# Patient Record
Sex: MALE | Race: WHITE | Employment: OTHER | URBAN - METROPOLITAN AREA
[De-identification: names, ages, dates, MRNs, and addresses within clinical notes are randomized per-mention and may not be internally consistent; named-entity substitution may affect disease eponyms.]

---

## 2017-03-07 ENCOUNTER — OFFICE VISIT (OUTPATIENT)
Dept: INTERNAL MEDICINE CLINIC | Age: 67
End: 2017-03-07

## 2017-03-07 VITALS
RESPIRATION RATE: 18 BRPM | WEIGHT: 315 LBS | OXYGEN SATURATION: 98 % | HEART RATE: 62 BPM | BODY MASS INDEX: 41.75 KG/M2 | TEMPERATURE: 98.7 F | SYSTOLIC BLOOD PRESSURE: 111 MMHG | DIASTOLIC BLOOD PRESSURE: 67 MMHG | HEIGHT: 73 IN

## 2017-03-07 DIAGNOSIS — I10 ESSENTIAL HYPERTENSION: Chronic | ICD-10-CM

## 2017-03-07 DIAGNOSIS — Z00.00 PREVENTATIVE HEALTH CARE: Primary | ICD-10-CM

## 2017-03-07 DIAGNOSIS — M17.5 OTHER SECONDARY OSTEOARTHRITIS OF LEFT KNEE: ICD-10-CM

## 2017-03-07 DIAGNOSIS — E78.00 PURE HYPERCHOLESTEROLEMIA: Chronic | ICD-10-CM

## 2017-03-07 DIAGNOSIS — Z86.010 HX OF ADENOMATOUS COLONIC POLYPS: ICD-10-CM

## 2017-03-07 NOTE — PROGRESS NOTES
HISTORY OF PRESENT ILLNESS  Susan Thibodeaux is a 61 y.o. male. HPI  Susan Thibodeaux is here for complete health maintenance physical exam and screening. he does have other concerns. Hypertension:  Susan Thibodeaux is a 61 y.o. male with hypertension. without Chronic kidney disease stage    Medication change since last visit: No  The patient reports:  taking medications as instructed, no medication side effects noted, no chest pain on exertion, no dyspnea on exertion, no swelling of ankles, no orthostatic dizziness or lightheadedness, no palpitations. Lifestyle modification/social history: not attempting to follow a low fat, low cholesterol diet, not attempting to follow a low sodium diet, exercises sporadically, nonsmoker    Lab Results   Component Value Date/Time    Sodium 139 10/05/2016 08:39 AM    Potassium 4.2 10/05/2016 08:39 AM    Chloride 99 10/05/2016 08:39 AM    CO2 22 10/05/2016 08:39 AM    Glucose 98 10/05/2016 08:39 AM    BUN 18 10/05/2016 08:39 AM    Creatinine 1.01 10/05/2016 08:39 AM    BUN/Creatinine ratio 18 10/05/2016 08:39 AM    GFR est AA 91 10/05/2016 08:39 AM    GFR est non-AA 79 10/05/2016 08:39 AM    Calcium 9.2 10/05/2016 08:39 AM         Hyperlipidemia:  Susan Thibodeaux is following up on his dyslipidemia. Cardiovascular risks for him are: LDL goal is under 100  hypertension  obese. Currently he takes Lipitor (atorvastatin) ,   Lab Results   Component Value Date/Time    Cholesterol, total 139 10/05/2016 08:39 AM    HDL Cholesterol 45 10/05/2016 08:39 AM    LDL, calculated 78 10/05/2016 08:39 AM    Triglyceride 78 10/05/2016 08:39 AM     No results found for: GPT, ALT, SGOT, GGT, GGTP, AP, APIT, APX, CBIL, TBIL, TBILI    Myalgias: no  Fatigue: no            Health maintenance hx includes:  Exercise: moderately active.   Form of exercise: yardwork   Diet: not attempting to follow a low fat, low cholesterol diet, exercises sporadically, nonsmoker    Cancer screening:    Colon cancer screening:  Last Colonoscopy: 2013 and was normal   Prostate cancer screening: PSA and/or RADHA: 2015 - normal  No results found for: PSA, PSA2, PSAR1, Henery Pear, PSAR3, [id number], [id number], PSALT       Lab Results   Component Value Date/Time    Cholesterol, total 139 10/05/2016 08:39 AM    HDL Cholesterol 45 10/05/2016 08:39 AM    LDL, calculated 78 10/05/2016 08:39 AM    VLDL, calculated 16 10/05/2016 08:39 AM    Triglyceride 78 10/05/2016 08:39 AM       Lab Results   Component Value Date/Time    Glucose 98 10/05/2016 08:39 AM         Immunizations:     Immunization History   Administered Date(s) Administered    Influenza High Dose Vaccine PF 10/05/2016    Influenza Vaccine 10/01/2015, 11/24/2015    Tdap 11/24/2014    Zoster Vaccine, Live 01/21/2016      Immunization status: up to date and documented. Social History     Social History    Marital status:      Spouse name: N/A    Number of children: N/A    Years of education: N/A     Occupational History    Not on file.      Social History Main Topics    Smoking status: Never Smoker    Smokeless tobacco: Not on file    Alcohol use 0.6 oz/week     1 Cans of beer, 0 Glasses of wine per week    Drug use: No    Sexual activity: Not Currently     Partners: Female     Other Topics Concern    Not on file     Social History Narrative     Past Surgical History:   Procedure Laterality Date    HX HEENT Left     partial thyroidectomy    HX ORTHOPAEDIC  1969    Left knee ACL     HX ORTHOPAEDIC  1979    dupytrens contractures    HX THYROIDECTOMY  05/2013    HX TONSILLECTOMY  1950    SKIN TISSUE PROCEDURE UNLISTED       Family History   Problem Relation Age of Onset    Diabetes Mother     Heart Disease Mother      heart valve replaced    Hypertension Father     Colon Polyps Father     Cancer Brother      Stomach      Current Outpatient Prescriptions on File Prior to Visit   Medication Sig Dispense Refill    triamcinolone acetonide (KENALOG) 0.1 % ointment Apply  to affected area two (2) times a day. use thin layer (Patient taking differently: Apply  to affected area two (2) times a day. use thin layer. Patient reports uses as needed) 30 g 2    cholecalciferol, vitamin D3, 2,000 unit tab Take  by mouth.  Omega-3 Fatty Acids 300 mg cap Take  by mouth.  calcium 500 mg tab Take  by mouth. With potassium      magnesium 250 mg tab Take  by mouth.  atorvastatin (LIPITOR) 10 mg tablet Take 10 mg by mouth nightly.  econazole nitrate (SPECTAZOLE) 1 % topical cream two (2) times a day. As needed      furosemide (LASIX) 20 mg tablet Take 20 mg by mouth daily.  indomethacin SR (INDOCIN SR) 75 mg SR capsule Take 75 mg by mouth daily as needed.  valsartan (DIOVAN) 160 mg tablet Take 160 mg by mouth daily.  verapamil ER (CALAN-SR) 240 mg CR tablet Take 240 mg by mouth two (2) times a day.  allopurinol (ZYLOPRIM) 300 mg tablet Take 1 Tab by mouth daily. 1 Tab 0     No current facility-administered medications on file prior to visit. .    Review of Systems   Constitutional: Negative for malaise/fatigue and weight loss. Eyes: Negative for blurred vision and pain. Respiratory: Negative for cough, shortness of breath and wheezing. Cardiovascular: Negative for chest pain, palpitations and leg swelling. Gastrointestinal: Negative for blood in stool, constipation, diarrhea, heartburn, nausea and vomiting. Genitourinary: Negative. Musculoskeletal: Negative for back pain, joint pain (L knee sometimes feels \"loose\" but no swelling, locking, collapsing. hx of ACL reconstruction in past) and myalgias. Skin: Negative for rash. Neurological: Negative for dizziness and headaches. Endo/Heme/Allergies: Negative for environmental allergies. Does not bruise/bleed easily. Psychiatric/Behavioral: Negative for depression. The patient is not nervous/anxious and does not have insomnia.         Physical Exam Constitutional: He is oriented to person, place, and time. He appears well-developed and well-nourished. No distress. Body mass index is 43.42 kg/(m^2). /67 (BP 1 Location: Left arm, BP Patient Position: Sitting)  Pulse 62  Temp 98.7 °F (37.1 °C) (Oral)   Resp 18  Ht 6' 1\" (1.854 m)  Wt 329 lb 2 oz (149.3 kg)  SpO2 98%  BMI 43.42 kg/m2   HENT:   Head: Normocephalic and atraumatic. Right Ear: Hearing, tympanic membrane and ear canal normal.   Left Ear: Hearing, tympanic membrane and ear canal normal.   Nose: Nose normal.   Mouth/Throat: Oropharynx is clear and moist and mucous membranes are normal. Normal dentition. Eyes: Conjunctivae and lids are normal. Pupils are equal, round, and reactive to light. Right eye exhibits no discharge. Left eye exhibits no discharge. No scleral icterus. Neck: Trachea normal. No thyromegaly present. Cardiovascular: Normal rate, regular rhythm, normal heart sounds, intact distal pulses and normal pulses. Exam reveals no gallop and no friction rub. No murmur heard. Pulmonary/Chest: Effort normal and breath sounds normal. No respiratory distress. Abdominal: Soft. Normal appearance and bowel sounds are normal. He exhibits no distension and no mass. There is no hepatosplenomegaly. There is no tenderness. There is no CVA tenderness. Musculoskeletal: He exhibits no edema or tenderness. Left knee: He exhibits decreased range of motion (mild to mod crepitus). He exhibits no swelling, no effusion and no deformity. No tenderness found. Lymphadenopathy:     He has no cervical adenopathy. Right: No inguinal adenopathy present. Left: No inguinal adenopathy present. Neurological: He is alert and oriented to person, place, and time. Skin: Skin is warm and dry. No rash noted. He is not diaphoretic. Psychiatric: He has a normal mood and affect.  His speech is normal and behavior is normal. Judgment and thought content normal. Cognition and memory are normal.   Nursing note and vitals reviewed. ASSESSMENT and Ginger Sawyer was seen today for well male. Diagnoses and all orders for this visit:    Preventative health care  -     [place]  he was advised to have follow up colonoscopy in 2018  The patient is advised to begin progressive daily aerobic exercise program, follow a low fat, low cholesterol diet and attempt to lose weight. Maddie Morales was counseled on age-appropriate/ guideline-based risk prevention behaviors and screening for a 61y.o. year old   male . We also discussed adjustments in screening based on family history if necessary. Printed instructions for preventative screening guidelines and healthy behaviors given to patient with after visit summary. Essential hypertension - Well controlled and stable. his medications were reviewed and refilled where necessary as noted below. Labs ordered as noted. -     METABOLIC PANEL, BASIC    Pure hypercholesterolemia -Well controlled and stable. his medications were reviewed and refilled where necessary as noted below. Labs ordered as noted. -     LIPID PANEL    Other secondary osteoarthritis of left knee -mild symptoms of osteoarthritis now. May refer to ortho if progressing  Hx of adenomatous colonic polyps  he was advised to have follow up colonoscopy in 2018        Follow-up Disposition:  Return in about 6 months (around 9/7/2017).

## 2017-03-07 NOTE — PATIENT INSTRUCTIONS

## 2017-03-08 LAB
BUN SERPL-MCNC: 18 MG/DL (ref 8–27)
BUN/CREAT SERPL: 17 (ref 10–22)
CALCIUM SERPL-MCNC: 9.5 MG/DL (ref 8.6–10.2)
CHLORIDE SERPL-SCNC: 97 MMOL/L (ref 96–106)
CHOLEST SERPL-MCNC: 141 MG/DL (ref 100–199)
CO2 SERPL-SCNC: 26 MMOL/L (ref 18–29)
CREAT SERPL-MCNC: 1.07 MG/DL (ref 0.76–1.27)
GLUCOSE SERPL-MCNC: 94 MG/DL (ref 65–99)
HDLC SERPL-MCNC: 46 MG/DL
INTERPRETATION, 910389: NORMAL
LDLC SERPL CALC-MCNC: 81 MG/DL (ref 0–99)
POTASSIUM SERPL-SCNC: 4.7 MMOL/L (ref 3.5–5.2)
PSA SERPL-MCNC: 0.3 NG/ML (ref 0–4)
SODIUM SERPL-SCNC: 140 MMOL/L (ref 134–144)
TRIGL SERPL-MCNC: 70 MG/DL (ref 0–149)
VLDLC SERPL CALC-MCNC: 14 MG/DL (ref 5–40)

## 2017-03-22 ENCOUNTER — OFFICE VISIT (OUTPATIENT)
Dept: DERMATOLOGY | Facility: AMBULATORY SURGERY CENTER | Age: 67
End: 2017-03-22

## 2017-03-22 VITALS
OXYGEN SATURATION: 98 % | DIASTOLIC BLOOD PRESSURE: 82 MMHG | WEIGHT: 315 LBS | RESPIRATION RATE: 21 BRPM | BODY MASS INDEX: 41.75 KG/M2 | TEMPERATURE: 98.1 F | HEART RATE: 61 BPM | HEIGHT: 73 IN | SYSTOLIC BLOOD PRESSURE: 116 MMHG

## 2017-03-22 DIAGNOSIS — Z85.820 PERSONAL HISTORY OF MALIGNANT MELANOMA OF SKIN: ICD-10-CM

## 2017-03-22 DIAGNOSIS — D22.9 BENIGN NEVUS: ICD-10-CM

## 2017-03-22 DIAGNOSIS — L82.1 SEBORRHEIC KERATOSES: ICD-10-CM

## 2017-03-22 DIAGNOSIS — R21 RASH AND OTHER NONSPECIFIC SKIN ERUPTION: Primary | ICD-10-CM

## 2017-03-22 RX ORDER — KETOCONAZOLE 20 MG/G
CREAM TOPICAL 2 TIMES DAILY
Qty: 60 G | Refills: 3 | Status: SHIPPED | OUTPATIENT
Start: 2017-03-22 | End: 2018-01-09 | Stop reason: SDUPTHER

## 2017-03-22 NOTE — MR AVS SNAPSHOT
Visit Information Date & Time Provider Department Dept. Phone Encounter #  
 3/22/2017 10:30 AM [facility] [address] [phone] [id number] Your Appointments 9/5/2017 10:00 AM  
ROUTINE CARE with Alisha Sweet, MD  
[facility] [address]) Appt Note: 6 mo  
 [place] [address] [address]  
[phone]  
  
   
 [address] [id number]  
  
    
 3/13/2018 10:15 AM  
COMPLETE PHYSICAL with Alihsa Sweet, MD  
[facility] [address]) Appt Note: cpe  
 [place] [address] [address]  
[phone]  
  
   
 [address] [facility] [address] Upcoming Health Maintenance Date Due COLONOSCOPY 10/23/1970 Pneumococcal 19-64 Highest Risk (1 of 3 - PCV13) 10/23/1971 DTaP/Tdap/Td series (2 - Td) 11/24/2024 Allergies as of 3/22/2017  Review Complete On: 3/22/2017 By: Lita Haile LPN No Known Allergies Current Immunizations  Reviewed on 3/7/2017 Name Date Influenza High Dose Vaccine PF 10/5/2016 Influenza Vaccine 11/24/2015, 10/1/2015 Tdap 11/24/2014 Zoster Vaccine, Live 1/21/2016 Not reviewed this visit Vitals BP Pulse Temp Resp Height(growth percentile) Weight(growth percentile) 116/82 (BP 1 Location: Left arm, BP Patient Position: Sitting) 61 98.1 °F (36.7 °C) (Oral) 21 6' 1\" (1.854 m) 329 lb (149.2 kg) SpO2 BMI Smoking Status 98% 43.41 kg/m2 Never Smoker Vitals History BMI and BSA Data Body Mass Index Body Surface Area  
 43.41 kg/m 2 2.77 m 2 Preferred Pharmacy Pharmacy Name Phone [id number] - [place] - [address] [phone] Your Updated Medication List  
  
   
This list is accurate as of: 3/22/17 10:42 AM.  Always use your most recent med list.  
  
  
  
  
 allopurinol 300 mg tablet Commonly known as:  [place] Potter Take 1 Tab by mouth daily. atorvastatin 10 mg tablet Commonly known as:  LIPITOR Take 10 mg by mouth nightly. calcium 500 mg Tab Take  by mouth. With potassium  
  
 cholecalciferol (vitamin D3) 2,000 unit Tab Take  by mouth.  
  
 econazole nitrate 1 % topical cream  
Commonly known as:  SPECTAZOLE  
two (2) times a day. As needed  
  
 furosemide 20 mg tablet Commonly known as:  LASIX Take 20 mg by mouth daily. indomethacin SR 75 mg SR capsule Commonly known as:  INDOCIN SR Take 75 mg by mouth daily as needed. magnesium 250 mg Tab Take  by mouth. Omega-3 Fatty Acids 300 mg Cap Take  by mouth.  
  
 triamcinolone acetonide 0.1 % ointment Commonly known as:  KENALOG Apply  to affected area two (2) times a day. use thin layer  
  
 valsartan 160 mg tablet Commonly known as:  DIOVAN Take 160 mg by mouth daily. verapamil  mg CR tablet Commonly known as:  CALAN-SR Take 240 mg by mouth two (2) times a day. Introducing [facility]! Nohemy Thornton introduces [employer] patient portal. Now you can access parts of your medical record, email your doctor's office, and request medication refills online. 1. In your internet browser, go to [email or web address] 2. Click on the First Time User? Click Here link in the Sign In box. You will see the New Member Sign Up page. 3. Enter your [employer] Access Code exactly as it appears below. You will not need to use this code after youve completed the sign-up process. If you do not sign up before the expiration date, you must request a new code. · [employer] Access Code: [email or web address] Expires: 6/5/2017 11:43 AM 
 
4. Enter the last four digits of your Social Security Number (xxxx) and Date of Birth (mm/dd/yyyy) as indicated and click Submit. You will be taken to the next sign-up page. 5. Create a [employer] ID. This will be your [employer] login ID and cannot be changed, so think of one that is secure and easy to remember. 6. Create a [employer] password. You can change your password at any time. 7. Enter your Password Reset Question and Answer. This can be used at a later time if you forget your password. 8. Enter your e-mail address. You will receive e-mail notification when new information is available in [address] 9. Click Sign Up. You can now view and download portions of your medical record. 10. Click the Download Summary menu link to download a portable copy of your medical information. If you have questions, please visit the Frequently Asked Questions section of the [employer] website. Remember, [employer] is NOT to be used for urgent needs. For medical emergencies, dial 911. Now available from your iPhone and Android! Please provide this summary of care documentation to your next provider. Your primary care clinician is listed as Julissa Schneider. If you have any questions after today's visit, please call [phone].

## 2017-03-22 NOTE — PROGRESS NOTES
Name: Yolanda Capps       Age: 61 y.o. Date: 3/22/2017    Chief Complaint:   Chief Complaint   Patient presents with    Skin Exam     3 Month       Subjective:    HPI  Mr. Yolanda Capps is a 61 y.o. male who presents for a full skin exam.  The patient's last skin exam was 3 months ago and the patient does have current complaints related to his skin. He continues with rash in each axilla - redness and irritation despite Triamcinolone. He does not use deodorant or anti-perspirant. He states he showers daily - sometimes two times daily which does help some. He has been using Selsun Blue under the axillary area as well with some relief. Mr. Yolanda Capps is feeling well and in his usual state of health today. The patient's pertinent skin history includes : MM - stage 1a of the left posterior shoulder (tx 2015), BCC of the scalp, right popliteal area    ROS: Constitutional: Negative. Dermatological : positive for - rash      Social History     Social History    Marital status:      Spouse name: N/A    Number of children: N/A    Years of education: N/A     Occupational History    Not on file.      Social History Main Topics    Smoking status: Never Smoker    Smokeless tobacco: Never Used    Alcohol use 0.6 oz/week     0 Glasses of wine, 1 Cans of beer per week    Drug use: No    Sexual activity: Not Currently     Partners: Female     Other Topics Concern    Not on file     Social History Narrative       Family History   Problem Relation Age of Onset    Diabetes Mother     Heart Disease Mother      heart valve replaced    Hypertension Father     Colon Polyps Father     Cancer Brother      Stomach        Past Medical History:   Diagnosis Date    Cancer ([address]     melanoma     Gout     Hypercholesterolemia     Hypertension     Skin cancer     melanoma (left post shoulder) and BCC x 2 (scalp, right popliteal)    Sun-damaged skin     Thyroid disease        Past Surgical History: Procedure Laterality Date    HX HEENT Left     partial thyroidectomy    HX ORTHOPAEDIC  1969    Left knee ACL     HX ORTHOPAEDIC  1979    dupytrens contractures    HX THYROIDECTOMY  05/2013    HX TONSILLECTOMY  1950    SKIN TISSUE PROCEDURE UNLISTED         Current Outpatient Prescriptions   Medication Sig Dispense Refill    ketoconazole (NIZORAL) 2 % topical cream Apply  to affected area two (2) times a day. Use thin layer under each axilla for rash for 10-14 days. 60 g 3    triamcinolone acetonide (KENALOG) 0.1 % ointment Apply  to affected area two (2) times a day. use thin layer (Patient taking differently: Apply  to affected area two (2) times a day. use thin layer. Patient reports uses as needed) 30 g 2    cholecalciferol, vitamin D3, 2,000 unit tab Take  by mouth.  Omega-3 Fatty Acids 300 mg cap Take  by mouth.  calcium 500 mg tab Take  by mouth. With potassium      magnesium 250 mg tab Take  by mouth.  atorvastatin (LIPITOR) 10 mg tablet Take 10 mg by mouth nightly.  furosemide (LASIX) 20 mg tablet Take 20 mg by mouth daily.  indomethacin SR (INDOCIN SR) 75 mg SR capsule Take 75 mg by mouth daily as needed.  valsartan (DIOVAN) 160 mg tablet Take 160 mg by mouth daily.  verapamil ER (CALAN-SR) 240 mg CR tablet Take 240 mg by mouth two (2) times a day.  allopurinol (ZYLOPRIM) 300 mg tablet Take 1 Tab by mouth daily. 1 Tab 0       No Known Allergies      Objective:    Visit Vitals    /82 (BP 1 Location: Left arm, BP Patient Position: Sitting)    Pulse 61    Temp 98.1 °F (36.7 °C) (Oral)    Resp 21    Ht 6' 1\" (1.854 m)    Wt 149.2 kg (329 lb)    SpO2 98%    BMI 43.41 kg/m2       Jaja Barragan is a 61 y.o. male who appears well and in no distress. He is awake, alert, and oriented. There is no preauricular, submandibular, or cervical lymphadenopathy.   A skin examination was performed including his scalp, face (including eyelids), ears, neck, chest, back, abdomen, upper extremities (including digits/nails), lower extremities, breasts, buttocks; genital skin was not examined. He has a few inflammatory papules and pustules on the face. There are areas of seborrheic dermatitis on the scalp with soft scale. There are a few inflamed pink seborrheic keratoses on the shoulders and upper back as well as non inflamed SKs on the trunk and extremities. There are numerous medium brown and dark brown junctional nevi without concerning features. There is a well healed scar on the left posterior shoulder - soft- no pigment or nodularity and no associated left axillary lymphadenopathy. There is pink skin change without inflammatory papules or pustules in each axillary area but left worse than right. Assessment/Plan:  1. Personal history of skin cancer. I discussed sun protection, sunscreen use, the warning signs of skin cancer, the need for self-skin examinations, and the need for regular practitioner exams every 3 months. The patient should follow up sooner as needed if new, changing, or symptomatic skin lesions arise. 2.Normal nevi. The diagnosis of normal nevi was reviewed. I discussed sun protection, sunscreen use, the warning signs of skin cancer, the need for self-skin examinations, and the need for regular practitioner exams every 3 months. The patient should follow up sooner as needed if new, changing, or symptomatic skin lesions arise. 3. Seborrheic keratoses. The diagnosis was reviewed and the patient was reassured that no treatment is needed for these benign lesions. 4. Rash in axillary area. This was not responsive to topical steroid which he states he has been compliant with. I will try adding anti-fungal and asked him to try Certain-Dry pads for sweating. We will see how this goes - he understands this may be more of a chronic condition.

## 2017-03-23 DIAGNOSIS — E78.00 PURE HYPERCHOLESTEROLEMIA: Chronic | ICD-10-CM

## 2017-03-23 RX ORDER — ATORVASTATIN CALCIUM 10 MG/1
10 TABLET, FILM COATED ORAL
Qty: 30 TAB | Refills: 6 | Status: SHIPPED | OUTPATIENT
Start: 2017-03-23 | End: 2018-02-21 | Stop reason: SDUPTHER

## 2017-06-20 ENCOUNTER — HOSPITAL ENCOUNTER (OUTPATIENT)
Dept: LAB | Age: 67
Discharge: HOME | End: 2017-06-20

## 2017-06-20 ENCOUNTER — OFFICE VISIT (OUTPATIENT)
Dept: DERMATOLOGY | Facility: AMBULATORY SURGERY CENTER | Age: 67
End: 2017-06-20

## 2017-06-20 VITALS
HEIGHT: 73 IN | BODY MASS INDEX: 41.75 KG/M2 | WEIGHT: 315 LBS | SYSTOLIC BLOOD PRESSURE: 129 MMHG | TEMPERATURE: 98 F | OXYGEN SATURATION: 98 % | DIASTOLIC BLOOD PRESSURE: 62 MMHG | HEART RATE: 63 BPM

## 2017-06-20 DIAGNOSIS — D22.9 MULTIPLE BENIGN NEVI: ICD-10-CM

## 2017-06-20 DIAGNOSIS — Z85.820 HISTORY OF MELANOMA: ICD-10-CM

## 2017-06-20 DIAGNOSIS — L08.9 PUSTULE: ICD-10-CM

## 2017-06-20 DIAGNOSIS — Z85.828 HISTORY OF NONMELANOMA SKIN CANCER: ICD-10-CM

## 2017-06-20 DIAGNOSIS — D48.5 NEOPLASM OF UNCERTAIN BEHAVIOR OF SKIN OF SHOULDER: Primary | ICD-10-CM

## 2017-06-20 DIAGNOSIS — R21 RASH: ICD-10-CM

## 2017-06-20 DIAGNOSIS — D18.01 CHERRY ANGIOMA: ICD-10-CM

## 2017-06-20 DIAGNOSIS — R23.8 INFLAMMATORY PAPULE: ICD-10-CM

## 2017-06-20 DIAGNOSIS — L90.5 SCAR CONDITION AND FIBROSIS OF SKIN: ICD-10-CM

## 2017-06-20 NOTE — PROGRESS NOTES
Name: Yeimi Steel       Age: 61 y.o. Date: 6/20/2017    Chief Complaint:   Chief Complaint   Patient presents with    Skin Exam     no chief complaint       Subjective:    HPI  Mr. Yeimi Steel is a 61 y.o. male who presents for a full skin exam.  The patient's last skin exam was on 3/22/17 and the patient does have current complaints related to his skin. He reports there is a tender lesion in the right ear. This is newly just noticed. No bleeding that he is aware of. Mr. Yeimi Steel is feeling well and in his usual state of health today. 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 Dermatological : positive for - skin lesion changes      Social History     Social History    Marital status:      Spouse name: N/A    Number of children: N/A    Years of education: N/A     Occupational History    Not on file.      Social History Main Topics    Smoking status: Never Smoker    Smokeless tobacco: Never Used    Alcohol use 0.6 oz/week     0 Glasses of wine, 1 Cans of beer per week    Drug use: No    Sexual activity: Not Currently     Partners: Female     Other Topics Concern    Not on file     Social History Narrative       Family History   Problem Relation Age of Onset    Diabetes Mother     Heart Disease Mother      heart valve replaced    Hypertension Father     Colon Polyps Father     Cancer Brother      Stomach        Past Medical History:   Diagnosis Date    Cancer ([facility] [address]     melanoma     Gout     Hypercholesterolemia     Hypertension     Skin cancer     melanoma (left post shoulder) and BCC x 2 (scalp, right popliteal)    Sun-damaged skin     Thyroid disease        Past Surgical History:   Procedure Laterality Date    HX HEENT Left     partial thyroidectomy    HX ORTHOPAEDIC  1969    Left knee ACL     HX ORTHOPAEDIC  1979    dupytrens contractures    HX THYROIDECTOMY  05/2013    HX TONSILLECTOMY  1950    SKIN TISSUE PROCEDURE UNLISTED         Current Outpatient Prescriptions   Medication Sig Dispense Refill    atorvastatin (LIPITOR) 10 mg tablet Take 1 Tab by mouth nightly. 30 Tab 6    ketoconazole (NIZORAL) 2 % topical cream Apply  to affected area two (2) times a day. Use thin layer under each axilla for rash for 10-14 days. 60 g 3    triamcinolone acetonide (KENALOG) 0.1 % ointment Apply  to affected area two (2) times a day. use thin layer (Patient taking differently: Apply  to affected area two (2) times a day. use thin layer. Patient reports uses as needed) 30 g 2    cholecalciferol, vitamin D3, 2,000 unit tab Take  by mouth.  Omega-3 Fatty Acids 300 mg cap Take  by mouth.  calcium 500 mg tab Take  by mouth. With potassium      magnesium 250 mg tab Take  by mouth.  furosemide (LASIX) 20 mg tablet Take 20 mg by mouth daily.  indomethacin SR (INDOCIN SR) 75 mg SR capsule Take 75 mg by mouth daily as needed.  valsartan (DIOVAN) 160 mg tablet Take 160 mg by mouth daily.  verapamil ER (CALAN-SR) 240 mg CR tablet Take 240 mg by mouth two (2) times a day.  allopurinol (ZYLOPRIM) 300 mg tablet Take 1 Tab by mouth daily. 1 Tab 0       No Known Allergies      Objective:    Visit Vitals    /62 (BP 1 Location: Right arm, BP Patient Position: Sitting)    Pulse 63    Temp 98 °F (36.7 °C)    Ht 6' 1\" (1.854 m)    Wt 149.2 kg (329 lb)    SpO2 98%    BMI 43.41 kg/m2       Abel Echeverria is a 61 y.o. male who appears well and in no distress. He is awake, alert, and oriented. There is no preauricular, submandibular, or cervical lymphadenopathy.   A skin examination was performed including his scalp, face (including eyelids), ears, neck, chest, back, abdomen, upper extremities (including digits/nails), lower extremities, breasts, buttocks; genital skin was not examined. Is a pustule in the right conchal bowl as well as many pustules on the face and trunk. There are numerous skin tags in each axilla. He has scattered medium brown junctional nevi a couple that are pink and brown compound and a few intradermal nevi without concerning features. On the left posterior shoulder is a well-healed scar no nodularity or pigment to suggest lesion recurrence and no associated left axillary lymph nodes are palpated. Left posterior shoulder also has a 5 x 3 mm pink shiny macule consistent with basal cell carcinoma. There are scattered cherry angiomas. He has a pink contact dermatitis appearing rash on the left flank and right medial calf. No vesicles are noted in this rash. There is a well-healed scar on the right posterior calf without evidence of lesion recurrence. Assessment/Plan:  1. Neoplasm of Uncertain Behavior, left posterior shoulder, r/o BCC. The differential diagnoses were discussed. Shave biopsy followed by curettage was advised to address this lesion with malignant destruction. The procedure was reviewed and verbal and written consent were obtained. The risks of pain, bleeding, infection, scar, and recurrence of the lesion were discussed. I performed the procedure. The site was cleansed and anesthetized with 1% Lidocaine with Epinephrine 1:100,000. Curettage was performed by me to include a 2 mm margin, resulting in a post curettage defect size of 9 x 7 mm. Drysol was used for hemostasis. The wound was bandaged and care reviewed. The specimen was sent to pathology. I will contact the patient with the results and any further treatment that may be necessary. [facility]   OFFICE PROCEDURE PROGRESS NOTE   Chart reviewed for the following:   I, Martina Davis, have reviewed the History, Physical and updated the Allergic reactions for [facility].    TIME OUT performed immediately prior to start of procedure:   I, Orestes Mullen, have performed the following reviews on [facility] 36   prior to the start of the procedure:     * Patient was identified by name and date of birth   * Agreement on procedure being performed was verified   * Risks and Benefits explained to the patient   * Procedure site verified and marked as necessary   * Patient was positioned for comfort   * Consent was signed and verified     Time: 1100  Date of procedure: 6/20/2017  Procedure performed by: Diego Block. Robyn Mullen  Provider assisted by: lpn   Patient assisted by: self   How tolerated by patient: tolerated the procedure well with no complications   Comments: none    2. Personal history of skin cancer, ,melanoma and non melanoma skin cancers. I discussed sun protection, sunscreen use, the warning signs of skin cancer, the need for self-skin examinations, and the need for regular practitioner exams every 3 months. The patient should follow up sooner as needed if new, changing, or symptomatic skin lesions arise. 3.Cherry angiomas. The diagnosis was reviewed and the patient was reassured that no treatment is needed for these benign lesions. 4.Normal nevi. The diagnosis of normal nevi was reviewed. I discussed sun protection, sunscreen use, the warning signs of skin cancer, the need for self-skin examinations, and the need for regular practitioner exams every 3 months. The patient should follow up sooner as needed if new, changing, or symptomatic skin lesions arise. 5. Inflammatory papules, pustules and rash. Diagnoses discussed. BP samples given. Continue OTC treatment. Offered topical steroid for rash be he deferred.

## 2017-06-26 ENCOUNTER — TELEPHONE (OUTPATIENT)
Dept: DERMATOLOGY | Facility: AMBULATORY SURGERY CENTER | Age: 67
End: 2017-06-26

## 2017-06-26 NOTE — PROGRESS NOTES
I spoke with the patient and he is aware of the diagnosis and that this was treated with Curettage at the visit ([address]). Follow up 6 mos.

## 2017-06-26 NOTE — TELEPHONE ENCOUNTER
Pt called said he got a phone call and he was returning the call.  Pt stated that he will be on the road and unable to take calls , if you could call tomorrow

## 2017-09-05 ENCOUNTER — OFFICE VISIT (OUTPATIENT)
Dept: INTERNAL MEDICINE CLINIC | Age: 67
End: 2017-09-05

## 2017-09-05 VITALS
DIASTOLIC BLOOD PRESSURE: 65 MMHG | HEART RATE: 63 BPM | TEMPERATURE: 98.3 F | HEIGHT: 73 IN | BODY MASS INDEX: 41.75 KG/M2 | SYSTOLIC BLOOD PRESSURE: 111 MMHG | RESPIRATION RATE: 18 BRPM | WEIGHT: 315 LBS | OXYGEN SATURATION: 98 %

## 2017-09-05 DIAGNOSIS — E78.00 PURE HYPERCHOLESTEROLEMIA: Chronic | ICD-10-CM

## 2017-09-05 DIAGNOSIS — Z23 ENCOUNTER FOR IMMUNIZATION: ICD-10-CM

## 2017-09-05 DIAGNOSIS — I10 ESSENTIAL HYPERTENSION: Primary | Chronic | ICD-10-CM

## 2017-09-05 DIAGNOSIS — Z86.010 HX OF ADENOMATOUS COLONIC POLYPS: ICD-10-CM

## 2017-09-05 NOTE — PROGRESS NOTES
HISTORY OF PRESENT ILLNESS  Lis Olivares is a 61 y.o. male. HPI  Hypertension:  Lis Olivares is a 61 y.o. male with hypertension. without Chronic kidney disease stage    Medication change since last visit: No  The patient reports:  taking medications as instructed, no medication side effects noted, home BP monitoring in range of 226'F systolic over 14'K diastolic, no TIA's, no chest pain on exertion, no dyspnea on exertion, no swelling of ankles, no orthostatic dizziness or lightheadedness, no palpitations. Lifestyle modification/social history: generally follows a low fat low cholesterol diet, generally follows a low sodium diet, exercises regularly, nonsmoker    Lab Results   Component Value Date/Time    Sodium 140 03/07/2017 11:01 AM    Potassium 4.7 03/07/2017 11:01 AM    Chloride 97 03/07/2017 11:01 AM    CO2 26 03/07/2017 11:01 AM    Glucose 94 03/07/2017 11:01 AM    BUN 18 03/07/2017 11:01 AM    Creatinine 1.07 03/07/2017 11:01 AM    BUN/Creatinine ratio 17 03/07/2017 11:01 AM    GFR est AA 84 03/07/2017 11:01 AM    GFR est non-AA 73 03/07/2017 11:01 AM    Calcium 9.5 03/07/2017 11:01 AM       Hyperlipidemia:  Lis Olivares is following up on his dyslipidemia. Cardiovascular risks for him are: LDL goal is under 100  hypertension.    Currently he takes Lipitor (atorvastatin) ,   Lab Results   Component Value Date/Time    Cholesterol, total 141 03/07/2017 11:01 AM    Cholesterol, total 139 10/05/2016 08:39 AM    HDL Cholesterol 46 03/07/2017 11:01 AM    HDL Cholesterol 45 10/05/2016 08:39 AM    LDL, calculated 81 03/07/2017 11:01 AM    LDL, calculated 78 10/05/2016 08:39 AM    Triglyceride 70 03/07/2017 11:01 AM    Triglyceride 78 10/05/2016 08:39 AM     No results found for: GPT, ALT, SGOT, GGT, GGTP, AP, APIT, APX, CBIL, TBIL, TBILI    Myalgias: no  Fatigue: no            ROS    Physical Exam  Visit Vitals    /65 (BP 1 Location: Left arm, BP Patient Position: Sitting)    Pulse 63    Temp 98.3 °F (36.8 °C) (Oral)    Resp 18    Ht 6' 1\" (1.854 m)    Wt 324 lb 2 oz (147 kg)    SpO2 98%    BMI 42.76 kg/m2       ASSESSMENT and PLAN  Diagnoses and all orders for this visit:    1. Essential hypertension - Well controlled and stable. his medications were reviewed and refilled where necessary as noted below. Labs ordered as noted. -     METABOLIC PANEL, BASIC    2. Pure hypercholesterolemia -Well controlled and stable. his medications were reviewed and refilled where necessary as noted below. Labs ordered as noted. Recheck next visit    3. Encounter for immunization  -     influenza vaccine 2015-16, 65yr+,,PF, (FLUZONE HIGH-DOSE) syrg injection; 0.5 mL by IntraMUSCular route PRIOR TO DISCHARGE for 1 dose. 4. Hx of adenomatous colonic polyps  he was advised to have follow up colonoscopy in 2018          Follow-up Disposition:  Return in about 6 months (around 3/5/2018).

## 2017-09-05 NOTE — MR AVS SNAPSHOT
Visit Information Date & Time Provider Department Dept. Phone Encounter #  
 9/5/2017 10:00 AM Rosmery Forbes MD [facility] [address] [facility] [id number] Follow-up Instructions Return in about 6 months (around 3/5/2018). Your Appointments 9/26/2017  9:30 AM  
Office Visit with PABLITO Maddenalexi [address]) Appt Note: 3 Months Skin exam  
 [facility] [address] [facility] [phone]  
  
   
 [facility] [address] [facility] [address]  
  
    
 3/13/2018 10:15 AM  
COMPLETE PHYSICAL with Rosmery Forbes MD  
[facility] [place] [address] [facility]) Appt Note: cpe  
 [address] [facility] [address] [phone]  
[phone]  
  
   
 [address] [facility] [address] Upcoming Health Maintenance Date Due COLONOSCOPY 10/23/1970 Pneumococcal 19-64 Highest Risk (1 of 3 - PCV13) 10/23/1971 INFLUENZA AGE 9 TO ADULT 8/1/2017 DTaP/Tdap/Td series (2 - Td) 11/24/2024 Allergies as of 9/5/2017  Review Complete On: 6/20/2017 By: Alexandria Rust NP No Known Allergies Current Immunizations  Reviewed on 9/5/2017 Name Date Influenza High Dose Vaccine PF 10/5/2016 Influenza Vaccine 11/24/2015, 10/1/2015 Tdap 11/24/2014 Zoster Vaccine, Live 1/21/2016 Reviewed by Rosmery Forbes MD on 9/5/2017 at 10:33 AM  
 Reviewed by Rosmery Forbes MD on 9/5/2017 at 10:33 AM  
 Reviewed by Rosmery Forbes MD on 9/5/2017 at 10:33 AM  
 Reviewed by Rosmery Forbes MD on 9/5/2017 at 10:34 AM  
You Were Diagnosed With   
  
 Codes Comments Essential hypertension    -  Primary ICD-10-CM: I10 
ICD-9-CM: 401.9 Pure hypercholesterolemia     ICD-10-CM: E78.00 ICD-9-CM: 272.0 Encounter for immunization     ICD-10-CM: I13 ICD-9-CM: V03.89  Hx of adenomatous colonic polyps     ICD-10-CM: Z86.010 
 ICD-9-CM: V12.72 Vitals BP Pulse Temp Resp Height(growth percentile) Weight(growth percentile) 111/65 (BP 1 Location: Left arm, BP Patient Position: Sitting) 63 98.3 °F (36.8 °C) (Oral) 18 6' 1\" (1.854 m) 324 lb 2 oz (147 kg) SpO2 BMI Smoking Status 98% 42.76 kg/m2 Never Smoker Vitals History BMI and BSA Data Body Mass Index Body Surface Area 42.76 kg/m 2 2.75 m 2 Preferred Pharmacy Pharmacy Name Phone [id number] [place] [address] [facility] [address] [phone] Your Updated Medication List  
  
   
This list is accurate as of: 9/5/17 10:41 AM.  Always use your most recent med list.  
  
  
  
  
 allopurinol 300 mg tablet Commonly known as:  Krishnan Massed Take 1 Tab by mouth daily. atorvastatin 10 mg tablet Commonly known as:  LIPITOR Take 1 Tab by mouth nightly. calcium 500 mg Tab Take  by mouth. With potassium  
  
 cholecalciferol (vitamin D3) 2,000 unit Tab Take  by mouth. furosemide 20 mg tablet Commonly known as:  LASIX Take 20 mg by mouth daily. indomethacin SR 75 mg SR capsule Commonly known as:  INDOCIN SR Take 75 mg by mouth daily as needed. influenza vaccine 2015-16 (65yr+)(PF) Syrg injection Commonly known as:  FLUZONE HIGH-DOSE  
0.5 mL by IntraMUSCular route PRIOR TO DISCHARGE for 1 dose. ketoconazole 2 % topical cream  
Commonly known as:  NIZORAL Apply  to affected area two (2) times a day. Use thin layer under each axilla for rash for 10-14 days. magnesium 250 mg Tab Take  by mouth. Omega-3 Fatty Acids 300 mg Cap Take  by mouth.  
  
 triamcinolone acetonide 0.1 % ointment Commonly known as:  KENALOG Apply  to affected area two (2) times a day. use thin layer  
  
 valsartan 160 mg tablet Commonly known as:  DIOVAN Take 160 mg by mouth daily. verapamil  mg CR tablet Commonly known as:  CALAN-SR  
 Take 240 mg by mouth two (2) times a day. Prescriptions Printed Refills  
 influenza vaccine 2015-16, 65yr+,,PF, (FLUZONE HIGH-DOSE) syrg injection 0 Si.5 mL by IntraMUSCular route PRIOR TO DISCHARGE for 1 dose. Class: Print Route: IntraMUSCular We Performed the Following METABOLIC PANEL, BASIC [98575 CPT(R)] Follow-up Instructions Return in about 6 months (around 3/5/2018). Patient Instructions   
[employer] Introducing [facility]! Franchesca Tafoya introduces Image[employer] patient portal. Now you can access parts of your medical record, email your doctor's office, and request medication refills online. 1. In your internet browser, go to [email or web address] 2. Click on the First Time User? Click Here link in the Sign In box. You will see the New Member Sign Up page. 3. Enter your Image[employer]ct Access Code exactly as it appears below. You will not need to use this code after youve completed the sign-up process. If you do not sign up before the expiration date, you must request a new code. · Image[employer]ct Access Code: [email or web address] Expires: 2017 10:36 AM 
 
4. Enter the last four digits of your Social Security Number (xxxx) and Date of Birth (mm/dd/yyyy) as indicated and click Submit. You will be taken to the next sign-up page. 5. Create a ImageProtect ID. This will be your Image[employer]ct login ID and cannot be changed, so think of one that is secure and easy to remember. 6. Create a [employer] password. You can change your password at any time. 7. Enter your Password Reset Question and Answer. This can be used at a later time if you forget your password. 8. Enter your e-mail address. You will receive e-mail notification when new information is available in [address] 9. Click Sign Up. You can now view and download portions of your medical record.  
10. Click the Download Summary menu link to download a portable copy of your medical information. If you have questions, please visit the Frequently Asked Questions section of the [employer] website. Remember, [employer] is NOT to be used for urgent needs. For medical emergencies, dial 911. Now available from your iPhone and Android! Please provide this summary of care documentation to your next provider. Your primary care clinician is listed as Joseph Castellano. If you have any questions after today's visit, please call [phone].

## 2017-09-06 LAB
BUN SERPL-MCNC: 20 MG/DL (ref 8–27)
BUN/CREAT SERPL: 18 (ref 10–24)
CALCIUM SERPL-MCNC: 9.6 MG/DL (ref 8.6–10.2)
CHLORIDE SERPL-SCNC: 97 MMOL/L (ref 96–106)
CO2 SERPL-SCNC: 24 MMOL/L (ref 18–29)
CREAT SERPL-MCNC: 1.12 MG/DL (ref 0.76–1.27)
GLUCOSE SERPL-MCNC: 91 MG/DL (ref 65–99)
POTASSIUM SERPL-SCNC: 4.5 MMOL/L (ref 3.5–5.2)
SODIUM SERPL-SCNC: 138 MMOL/L (ref 134–144)

## 2017-09-07 DIAGNOSIS — I10 ESSENTIAL HYPERTENSION: Chronic | ICD-10-CM

## 2017-09-07 DIAGNOSIS — M10.00 IDIOPATHIC GOUT, UNSPECIFIED CHRONICITY, UNSPECIFIED SITE: Chronic | ICD-10-CM

## 2017-09-07 RX ORDER — VERAPAMIL HYDROCHLORIDE 240 MG/1
240 TABLET, FILM COATED, EXTENDED RELEASE ORAL 2 TIMES DAILY
Qty: 30 TAB | Refills: 2 | Status: SHIPPED | OUTPATIENT
Start: 2017-09-07 | End: 2017-10-18 | Stop reason: SDUPTHER

## 2017-09-07 RX ORDER — FUROSEMIDE 20 MG/1
20 TABLET ORAL DAILY
Qty: 30 TAB | Refills: 5 | Status: SHIPPED | OUTPATIENT
Start: 2017-09-07 | End: 2018-02-21 | Stop reason: SDUPTHER

## 2017-09-07 RX ORDER — VALSARTAN 160 MG/1
160 TABLET ORAL DAILY
Qty: 30 TAB | Refills: 1 | Status: SHIPPED | OUTPATIENT
Start: 2017-09-07 | End: 2017-11-03 | Stop reason: SDUPTHER

## 2017-09-07 RX ORDER — ALLOPURINOL 300 MG/1
300 TABLET ORAL DAILY
Qty: 30 TAB | Refills: 1 | Status: SHIPPED | OUTPATIENT
Start: 2017-09-07 | End: 2017-11-03 | Stop reason: SDUPTHER

## 2017-09-07 NOTE — TELEPHONE ENCOUNTER
These four prescription were not filled when he came in to see Dr Jack Renteria. Did want him to cont taking them.  (1)allopurinol (ZYLOPRIM) 300 mg tablet (2)furosemide (LASIX) 20 mg tablet (3)valsartan (DIOVAN) 160 mg tablet (4)verapamil ER (CALAN-SR) 240 mg CR tablet  [phone]   His no is [phone]

## 2017-09-26 ENCOUNTER — OFFICE VISIT (OUTPATIENT)
Dept: DERMATOLOGY | Facility: AMBULATORY SURGERY CENTER | Age: 67
End: 2017-09-26

## 2017-09-26 VITALS
RESPIRATION RATE: 18 BRPM | BODY MASS INDEX: 41.75 KG/M2 | WEIGHT: 315 LBS | HEIGHT: 73 IN | TEMPERATURE: 98.5 F | OXYGEN SATURATION: 97 % | HEART RATE: 57 BPM | DIASTOLIC BLOOD PRESSURE: 80 MMHG | SYSTOLIC BLOOD PRESSURE: 140 MMHG

## 2017-09-26 DIAGNOSIS — L82.1 SEBORRHEIC KERATOSES: ICD-10-CM

## 2017-09-26 DIAGNOSIS — Z85.828 HISTORY OF BASAL CELL CARCINOMA: ICD-10-CM

## 2017-09-26 DIAGNOSIS — D22.9 MULTIPLE BENIGN NEVI: ICD-10-CM

## 2017-09-26 DIAGNOSIS — L25.9 CONTACT DERMATITIS AND OTHER ECZEMA, DUE TO UNSPECIFIED CAUSE: Primary | ICD-10-CM

## 2017-09-26 DIAGNOSIS — Z85.820 PERSONAL HISTORY OF MALIGNANT MELANOMA OF SKIN: ICD-10-CM

## 2017-09-26 DIAGNOSIS — L90.5 SCAR CONDITION AND FIBROSIS OF SKIN: ICD-10-CM

## 2017-09-26 DIAGNOSIS — L91.8 CUTANEOUS SKIN TAGS: ICD-10-CM

## 2017-09-26 DIAGNOSIS — Z85.828 FOLLOW-UP SURVEILLANCE OF SKIN CANCER, ENCOUNTER FOR: ICD-10-CM

## 2017-09-26 DIAGNOSIS — D18.01 CHERRY ANGIOMA: ICD-10-CM

## 2017-09-26 DIAGNOSIS — Z08 FOLLOW-UP SURVEILLANCE OF SKIN CANCER, ENCOUNTER FOR: ICD-10-CM

## 2017-09-26 RX ORDER — TRIAMCINOLONE ACETONIDE 1 MG/G
OINTMENT TOPICAL 2 TIMES DAILY
Qty: 30 G | Refills: 2 | Status: SHIPPED | OUTPATIENT
Start: 2017-09-26 | End: 2018-01-09

## 2017-09-26 NOTE — PROGRESS NOTES
Name: Arnie Helms       Age: 61 y.o. Date: 9/26/2017    Chief Complaint:   Chief Complaint   Patient presents with    Skin Exam       Subjective:    HPI  Mr. Arnie Helms is a 61 y.o. male who presents for a full skin exam.  The patient's last skin exam was 3 months ago and the patient does have current complaints related to his skin. He reports a new growth on the left areola. He denies associated symptoms. He has also noticed a rash on the left flank and left lower leg. 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 This does itch. The patient's pertinent skin history includes : personal history of BCC on the left posterior shoulder, scalp and right popliteal area. History of malignant melanoma stage 1a from the left shoulder - Breslow depth 0.3mm, treated in 2015 with wide excision. ROS: Constitutional: Negative. Dermatological : positive for - skin lesion changes      Social History     Social History    Marital status:      Spouse name: N/A    Number of children: N/A    Years of education: N/A     Occupational History    Not on file.      Social History Main Topics    Smoking status: Never Smoker    Smokeless tobacco: Never Used    Alcohol use 0.6 oz/week     0 Glasses of wine, 1 Cans of beer per week    Drug use: No    Sexual activity: Not Currently     Partners: Female     Other Topics Concern    Not on file     Social History Narrative       Family History   Problem Relation Age of Onset    Diabetes Mother     Heart Disease Mother      heart valve replaced    Hypertension Father     Colon Polyps Father     Cancer Brother      Stomach        Past Medical History:   Diagnosis Date    Cancer ([facility] [address]     melanoma     Gout     Hypercholesterolemia     Hypertension     Skin cancer     melanoma (left post shoulder) and BCC x 2 (scalp, right popliteal)    Sun-damaged skin     Thyroid disease        Past Surgical History:   Procedure Laterality Date    HX HEENT Left     partial thyroidectomy    HX ORTHOPAEDIC  1969    Left knee ACL     HX ORTHOPAEDIC  1979    dupytrens contractures    HX THYROIDECTOMY  05/2013    HX TONSILLECTOMY  1950    SKIN TISSUE PROCEDURE UNLISTED         Current Outpatient Prescriptions   Medication Sig Dispense Refill    triamcinolone acetonide (KENALOG) 0.1 % ointment Apply  to affected area two (2) times a day. use thin layer 30 g 2    valsartan (DIOVAN) 160 mg tablet Take 1 Tab by mouth daily. 30 Tab 1    allopurinol (ZYLOPRIM) 300 mg tablet Take 1 Tab by mouth daily. 30 Tab 1    furosemide (LASIX) 20 mg tablet Take 1 Tab by mouth daily. 30 Tab 5    verapamil ER (CALAN-SR) 240 mg CR tablet Take 1 Tab by mouth two (2) times a day. 30 Tab 2    atorvastatin (LIPITOR) 10 mg tablet Take 1 Tab by mouth nightly. 30 Tab 6    ketoconazole (NIZORAL) 2 % topical cream Apply  to affected area two (2) times a day. Use thin layer under each axilla for rash for 10-14 days. 60 g 3    cholecalciferol, vitamin D3, 2,000 unit tab Take  by mouth.  Omega-3 Fatty Acids 300 mg cap Take  by mouth.  calcium 500 mg tab Take  by mouth. With potassium      magnesium 250 mg tab Take 400 mg by mouth daily.  indomethacin SR (INDOCIN SR) 75 mg SR capsule Take 75 mg by mouth daily as needed.  influenza vaccine 2015-16, 65yr+,,PF, (FLUZONE HIGH-DOSE) syrg injection 0.5 mL by IntraMUSCular route PRIOR TO DISCHARGE for 1 dose. 0.5 mL 0       No Known Allergies      Objective:    Visit Vitals    /80 (BP 1 Location: Right arm, BP Patient Position: Sitting)    Pulse (!) 57    Temp 98.5 °F (36.9 °C) (Oral)    Resp 18    Ht 6' 1\" (1.854 m)    Wt 148.8 kg (328 lb)    SpO2 97%    BMI 43.27 kg/m2       Kimo Hernandez is a 61 y.o. male who appears well and in no distress. He is awake, alert, and oriented. There is no preauricular, submandibular, or cervical lymphadenopathy.   A skin examination was performed including his scalp, face (including eyelids), ears, neck, chest, back, abdomen, upper extremities (including digits/nails), lower extremities, breasts, buttocks; genital skin was not examined. He is scattered seborrheic keratoses on the scalp, trunk and extremities. There are scattered cherry angiomas. He has skin tags noted on the left areola, and numerous in each axilla. There is dermographism on the left flank with a few hives and scaly redness on the left anterior shin. No vesicles or petechiae. He has a well-healed scar in the left posterior shoulder is linear, without nodularity or pigment to suggest lesion recurrence-and there is no palpable left axillary lymphadenopathy associated. He has well-healed scars in the left posterior shoulder, scalp and right popliteal area without evidence of lesion recurrence. He has benign-appearing nevi as well. There are multiple pustules and inflammatory papules on the face, trunk. Assessment/Plan:  1. Personal history of skin cancer. I discussed sun protection, sunscreen use, the warning signs of skin cancer, the need for self-skin examinations, and the need for regular practitioner exams every 3 months. The patient should follow up sooner as needed if new, changing, or symptomatic skin lesions arise. 2. Normal nevi. The diagnosis of normal nevi was reviewed. I discussed sun protection, sunscreen use, the warning signs of skin cancer, the need for self-skin examinations, and the need for regular practitioner exams every 3 months. The patient should follow up sooner as needed if new, changing, or symptomatic skin lesions arise. 3.Seborrheic keratoses. The diagnosis was reviewed and the patient was reassured that no treatment is needed for these benign lesions. 4. Cherry angiomas. The diagnosis was reviewed and the patient was reassured that no treatment is needed for these benign lesions. 5. Cutaneous skin tags. 6. Rash. Most consistent with a contact dermatitis plus dermatographism. I renewed his Triamcinolone.

## 2017-09-26 NOTE — PROGRESS NOTES
Chief Complaint   Patient presents with    Skin Exam     1. Have you been to the ER, urgent care clinic since your last visit? Hospitalized since your last visit? No    2. Have you seen or consulted any other health care providers outside of the [address] [place] [address] since your last visit? Include any pap smears or colon screening.  No

## 2017-10-18 DIAGNOSIS — I10 ESSENTIAL HYPERTENSION: Chronic | ICD-10-CM

## 2017-10-18 RX ORDER — VERAPAMIL HYDROCHLORIDE 240 MG/1
240 TABLET, FILM COATED, EXTENDED RELEASE ORAL 2 TIMES DAILY
Qty: 180 TAB | Refills: 1 | Status: SHIPPED | OUTPATIENT
Start: 2017-10-18 | End: 2018-04-27 | Stop reason: SDUPTHER

## 2018-01-09 ENCOUNTER — OFFICE VISIT (OUTPATIENT)
Dept: DERMATOLOGY | Facility: AMBULATORY SURGERY CENTER | Age: 68
End: 2018-01-09

## 2018-01-09 VITALS
HEIGHT: 73 IN | HEART RATE: 68 BPM | TEMPERATURE: 98.7 F | WEIGHT: 315 LBS | SYSTOLIC BLOOD PRESSURE: 120 MMHG | BODY MASS INDEX: 41.75 KG/M2 | RESPIRATION RATE: 20 BRPM | OXYGEN SATURATION: 95 % | DIASTOLIC BLOOD PRESSURE: 68 MMHG

## 2018-01-09 DIAGNOSIS — Z85.828 FOLLOW-UP SURVEILLANCE OF SKIN CANCER, ENCOUNTER FOR: ICD-10-CM

## 2018-01-09 DIAGNOSIS — D22.9 MULTIPLE BENIGN NEVI: Primary | ICD-10-CM

## 2018-01-09 DIAGNOSIS — Z85.820 PERSONAL HISTORY OF MALIGNANT MELANOMA OF SKIN: ICD-10-CM

## 2018-01-09 DIAGNOSIS — L91.8 CUTANEOUS SKIN TAGS: ICD-10-CM

## 2018-01-09 DIAGNOSIS — D18.01 CHERRY ANGIOMA: ICD-10-CM

## 2018-01-09 DIAGNOSIS — L74.519 PRIMARY FOCAL HYPERHIDROSIS: ICD-10-CM

## 2018-01-09 DIAGNOSIS — Z08 FOLLOW-UP SURVEILLANCE OF SKIN CANCER, ENCOUNTER FOR: ICD-10-CM

## 2018-01-09 DIAGNOSIS — L90.5 SCAR CONDITION AND FIBROSIS OF SKIN: ICD-10-CM

## 2018-01-09 DIAGNOSIS — R21 RASH AND OTHER NONSPECIFIC SKIN ERUPTION: ICD-10-CM

## 2018-01-09 DIAGNOSIS — Z85.828 HISTORY OF NONMELANOMA SKIN CANCER: ICD-10-CM

## 2018-01-09 RX ORDER — KETOCONAZOLE 20 MG/G
CREAM TOPICAL 2 TIMES DAILY
Qty: 60 G | Refills: 3 | Status: SHIPPED | OUTPATIENT
Start: 2018-01-09 | End: 2018-09-21 | Stop reason: SDUPTHER

## 2018-01-09 RX ORDER — TRIAMCINOLONE ACETONIDE 1 MG/G
CREAM TOPICAL 2 TIMES DAILY
Qty: 45 G | Refills: 3 | Status: SHIPPED | OUTPATIENT
Start: 2018-01-09 | End: 2019-05-20 | Stop reason: ALTCHOICE

## 2018-01-09 NOTE — PROGRESS NOTES
Name: Jeffrey Conway       Age: 74 y.o. Date: 1/9/2018    Chief Complaint:   Chief Complaint   Patient presents with    Skin Exam       Subjective:    HPI  Mr. Jeffrey Conway is a 74 y.o. male who presents for a full skin exam.  The patient's last skin exam was on 9/26/17 and the patient does not have current complaints related to his skin. He states he continues to use the Ketoconazole and the topical steroids in his axillary areas. The patient's pertinent skin history includes : BCC of the left posterior shoulder, scalp, right popliteal area and Melanoma stage 1a of the left posterior shoulder, Breslow 0.3mm tx 2015    ROS: Constitutional: Negative. Dermatological : positive for - rash      Social History     Social History    Marital status:      Spouse name: N/A    Number of children: N/A    Years of education: N/A     Occupational History    Not on file.      Social History Main Topics    Smoking status: Never Smoker    Smokeless tobacco: Never Used    Alcohol use 0.6 oz/week     0 Glasses of wine, 1 Cans of beer per week    Drug use: No    Sexual activity: Not Currently     Partners: Female     Other Topics Concern    Not on file     Social History Narrative       Family History   Problem Relation Age of Onset    Diabetes Mother     Heart Disease Mother      heart valve replaced    Hypertension Father     Colon Polyps Father     Cancer Brother      Stomach        Past Medical History:   Diagnosis Date    Cancer ([facility] [address]     melanoma     Gout     Hypercholesterolemia     Hypertension     Skin cancer     melanoma (left post shoulder) and BCC x 2 (scalp, right popliteal)    Sun-damaged skin     Thyroid disease        Past Surgical History:   Procedure Laterality Date    HX HEENT Left     partial thyroidectomy    HX ORTHOPAEDIC  1969    Left knee ACL     HX ORTHOPAEDIC  1979    dupytrens contractures    HX THYROIDECTOMY  05/2013    HX TONSILLECTOMY  1950    SKIN TISSUE PROCEDURE UNLISTED         Current Outpatient Prescriptions   Medication Sig Dispense Refill    triamcinolone acetonide (KENALOG) 0.1 % topical cream Apply  to affected area two (2) times a day. use thin layer 45 g 3    ketoconazole (NIZORAL) 2 % topical cream Apply  to affected area two (2) times a day. Use thin layer under each axilla for rash for 10-14 days. 60 g 3    allopurinol (ZYLOPRIM) 300 mg tablet TAKE 1 TAB BY MOUTH DAILY. 30 Tab 5    valsartan (DIOVAN) 160 mg tablet TAKE 1 TAB BY MOUTH DAILY. 30 Tab 5    verapamil ER (CALAN-SR) 240 mg CR tablet Take 1 Tab by mouth two (2) times a day. 180 Tab 1    furosemide (LASIX) 20 mg tablet Take 1 Tab by mouth daily. 30 Tab 5    atorvastatin (LIPITOR) 10 mg tablet Take 1 Tab by mouth nightly. 30 Tab 6    cholecalciferol, vitamin D3, 2,000 unit tab Take  by mouth.  Omega-3 Fatty Acids 300 mg cap Take  by mouth.  calcium 500 mg tab Take  by mouth. With potassium      magnesium 250 mg tab Take 400 mg by mouth daily.  indomethacin SR (INDOCIN SR) 75 mg SR capsule Take 75 mg by mouth daily as needed. No Known Allergies      Objective:    Visit Vitals    /68 (BP 1 Location: Left arm, BP Patient Position: Sitting)    Pulse 68    Temp 98.7 °F (37.1 °C) (Oral)    Resp 20    Ht 6' 1\" (1.854 m)    Wt 148.8 kg (328 lb)    SpO2 95%    BMI 43.27 kg/m2       Joyce Escobedo is a 74 y.o. male who appears well and in no distress. He is awake, alert, and oriented. There is no preauricular, submandibular, or cervical lymphadenopathy. A skin examination was performed including his scalp, face (including eyelids), ears, neck, chest, back, abdomen, upper extremities (including digits/nails), lower extremities, breasts, buttocks; genital skin was not examined. He has multiple inflammatory papules and pustules on the face.   He has numerous medium brown junctional nevi and a few pink and brown intradermal nevi that are without concerning features for severe atypia. There are well-healed scars on the left posterior shoulder and right popliteal area without evidence of lesion recurrence. Specifically his melanoma scar is without nodularity or pigment in the skin surrounding the scar and in the scar to suggest lesion recurrence. He has numerous skin tags in each axilla with mild redness. He is scattered cherry angiomas. His edema in his lower legs. There is an ecchymotic right second toe. Assessment/Plan:  1. Personal history of skin cancer. I discussed sun protection, sunscreen use, the warning signs of skin cancer, the need for self-skin examinations, and the need for regular practitioner exams every 4 months. The patient should follow up sooner as needed if new, changing, or symptomatic skin lesions arise. 2.Normal nevi. The diagnosis of normal nevi was reviewed. I discussed sun protection, sunscreen use, the warning signs of skin cancer, the need for self-skin examinations, and the need for regular practitioner exams every 4 months. The patient should follow up sooner as needed if new, changing, or symptomatic skin lesions arise. 3. Cherry angiomas. The diagnosis was reviewed and the patient was reassured that no treatment is needed for these benign lesions. 4. Acne/ facial rash. Does not bother him - actually looks a little better. 5. Axillary rash, skin tags. Continue intermittent use of antifungal and steroids. Likely due to increased sweating and skin tags. May consider Zeasorb AF/ Zeasorb or Drysol.

## 2018-01-09 NOTE — MR AVS SNAPSHOT
Visit Information Date & Time Provider Department Dept. Phone Encounter #  
 1/9/2018 11:30 AM Radha Colindres NP [place] [address] [phone] [id number] Your Appointments 3/13/2018 10:15 AM  
COMPLETE PHYSICAL with Kev Toure MD  
[facility]) Appt Note: cpe  
 [address] [facility] [address] [id number]  
[phone]  
  
   
 [address] [facility] [address] Upcoming Health Maintenance Date Due COLONOSCOPY 10/23/1970 Influenza Age 5 to Adult 8/1/2017 GLAUCOMA SCREENING Q2Y 10/23/2017 Pneumococcal 65+ High/Highest Risk (1 of 2 - PCV13) 10/23/2017 MEDICARE YEARLY EXAM 10/23/2017 DTaP/Tdap/Td series (2 - Td) 11/24/2024 Allergies as of 1/9/2018  Review Complete On: 1/9/2018 By: Clari Niño No Known Allergies Current Immunizations  Reviewed on 9/5/2017 Name Date Influenza High Dose Vaccine PF 11/15/2017, 10/5/2016 Influenza Vaccine 11/24/2015, 10/1/2015 Tdap 11/24/2014 Zoster Vaccine, Live 1/21/2016 Not reviewed this visit Vitals BP Pulse Temp Resp Height(growth percentile) Weight(growth percentile) 120/68 (BP 1 Location: Left arm, BP Patient Position: Sitting) 68 98.7 °F (37.1 °C) (Oral) 20 6' 1\" (1.854 m) 328 lb (148.8 kg) SpO2 BMI Smoking Status 95% 43.27 kg/m2 Never Smoker BMI and BSA Data Body Mass Index Body Surface Area  
 43.27 kg/m 2 2.77 m 2 Preferred Pharmacy Pharmacy Name Phone [id number] - [facility] - [address], [place] [address] [phone] Your Updated Medication List  
  
   
This list is accurate as of: 1/9/18 11:31 AM.  Always use your most recent med list.  
  
  
  
  
 allopurinol 300 mg tablet Commonly known as:  ZYLOPRIM  
TAKE 1 TAB BY MOUTH DAILY. atorvastatin 10 mg tablet Commonly known as:  LIPITOR Take 1 Tab by mouth nightly. calcium 500 mg Tab Take  by mouth. With potassium  
  
 cholecalciferol (vitamin D3) 2,000 unit Tab Take  by mouth. furosemide 20 mg tablet Commonly known as:  LASIX Take 1 Tab by mouth daily. indomethacin SR 75 mg SR capsule Commonly known as:  INDOCIN SR Take 75 mg by mouth daily as needed. ketoconazole 2 % topical cream  
Commonly known as:  NIZORAL Apply  to affected area two (2) times a day. Use thin layer under each axilla for rash for 10-14 days. magnesium 250 mg Tab Take 400 mg by mouth daily. Omega-3 Fatty Acids 300 mg Cap Take  by mouth.  
  
 triamcinolone acetonide 0.1 % ointment Commonly known as:  KENALOG Apply  to affected area two (2) times a day. use thin layer  
  
 valsartan 160 mg tablet Commonly known as:  DIOVAN  
TAKE 1 TAB BY MOUTH DAILY. verapamil  mg CR tablet Commonly known as:  CALAN-SR Take 1 Tab by mouth two (2) times a day. Patient Instructions Self Skin Exam and Sunscreens Early detection and treatment is essential in the treatment of all forms of skin cancer. If caught early, all forms of skin cancer are curable. In addition to your regular visits, you should perform a monthly skin examination. Over time, you become familiar with what is normally found on your skin and can identify new or suspicious spots. One of the screening tools you can use to assess your skin is to follow the ABCDEs: 
 
A= Asymmetry (One half is unlike the other half) B= Border (An irregular, scalloped or poorly defined edge) C= Color (Is varied from one area to another, has shades of tan, brown/ black,       white, red or blue) D= Diameter (Spots larger than 6mm or a pencil eraser) E= Evolving (New spots or one that is changing in size, shape, or color) A follow- up interval will be customized based on your history of skin cancer or level of skin damage and risk factors. In any case, if you notice a suspicious or new spot, an appointment should be arranged between regular visits. Everyone should use sunscreen and sun-safe practices, which is especially important for those with a personal or family history of skin cancer. Suggestions for this include: 1. Use daily moisturizers containing SPF 30 or higher. 2. Wear long sleeve clothing with UPF ratings and a broad-brimmed hat. 3. Apply sunscreen with SPF 30 or higher to all sun exposed areas if you are going to be in the sun. A broad spectrum UVA/ UVB sunscreen is best.  Dont forget to REAPPLY every two hours or more often if swimming or sweating! 4. Avoid outside activities during peak sun hours, especially in the summer (10am- 2pm). 5. DO NOT use tanning beds. Using sunscreen and sun-safe practices can help reduce the likelihood of developing skin cancer or additional skin cancers in those previously diagnosed. Introducing [facility]! Samantha Poole introduces [employer] patient portal. Now you can access parts of your medical record, email your doctor's office, and request medication refills online. 1. In your internet browser, go to [email or web address] 2. Click on the First Time User? Click Here link in the Sign In box. You will see the New Member Sign Up page. 3. Enter your [employer] Access Code exactly as it appears below. You will not need to use this code after youve completed the sign-up process. If you do not sign up before the expiration date, you must request a new code. · [employer] Access Code: [email or web address] Expires: 4/9/2018 11:31 AM 
 
4. Enter the last four digits of your Social Security Number (xxxx) and Date of Birth (mm/dd/yyyy) as indicated and click Submit. You will be taken to the next sign-up page. 5. Create a [employer] ID.  This will be your [employer] login ID and cannot be changed, so think of one that is secure and easy to remember. 6. Create a [employer] password. You can change your password at any time. 7. Enter your Password Reset Question and Answer. This can be used at a later time if you forget your password. 8. Enter your e-mail address. You will receive e-mail notification when new information is available in [address] 9. Click Sign Up. You can now view and download portions of your medical record. 10. Click the Download Summary menu link to download a portable copy of your medical information. If you have questions, please visit the Frequently Asked Questions section of the [employer] website. Remember, [employer] is NOT to be used for urgent needs. For medical emergencies, dial 911. Now available from your iPhone and Android! Please provide this summary of care documentation to your next provider. Your primary care clinician is listed as Viji Hargrove. If you have any questions after today's visit, please call [phone].

## 2018-01-09 NOTE — PROGRESS NOTES
Chief Complaint   Patient presents with    Skin Exam     1. Have you been to the ER, urgent care clinic since your last visit? Hospitalized since your last visit? No    2. Have you seen or consulted any other health care providers outside of the [address] [place] [address] since your last visit? Include any pap smears or colon screening.  No

## 2018-02-21 DIAGNOSIS — E78.00 PURE HYPERCHOLESTEROLEMIA: Chronic | ICD-10-CM

## 2018-02-21 DIAGNOSIS — I10 ESSENTIAL HYPERTENSION: Chronic | ICD-10-CM

## 2018-02-21 RX ORDER — FUROSEMIDE 20 MG/1
TABLET ORAL
Qty: 30 TAB | Refills: 5 | Status: SHIPPED | OUTPATIENT
Start: 2018-02-21 | End: 2018-02-26 | Stop reason: SDUPTHER

## 2018-02-21 RX ORDER — ATORVASTATIN CALCIUM 10 MG/1
TABLET, FILM COATED ORAL
Qty: 30 TAB | Refills: 6 | Status: SHIPPED | OUTPATIENT
Start: 2018-02-21 | End: 2018-02-26 | Stop reason: SDUPTHER

## 2018-02-26 DIAGNOSIS — E78.00 PURE HYPERCHOLESTEROLEMIA: Chronic | ICD-10-CM

## 2018-02-26 DIAGNOSIS — M10.00 IDIOPATHIC GOUT, UNSPECIFIED CHRONICITY, UNSPECIFIED SITE: Chronic | ICD-10-CM

## 2018-02-26 DIAGNOSIS — I10 ESSENTIAL HYPERTENSION: Chronic | ICD-10-CM

## 2018-02-26 RX ORDER — FUROSEMIDE 20 MG/1
20 TABLET ORAL DAILY
Qty: 90 TAB | Refills: 1 | Status: SHIPPED | OUTPATIENT
Start: 2018-02-26 | End: 2018-12-20 | Stop reason: ALTCHOICE

## 2018-02-26 RX ORDER — ALLOPURINOL 300 MG/1
300 TABLET ORAL DAILY
Qty: 90 TAB | Refills: 1 | Status: SHIPPED | OUTPATIENT
Start: 2018-02-26 | End: 2019-05-20 | Stop reason: SDUPTHER

## 2018-02-26 RX ORDER — ATORVASTATIN CALCIUM 10 MG/1
10 TABLET, FILM COATED ORAL EVERY EVENING
Qty: 90 TAB | Refills: 1 | Status: SHIPPED | OUTPATIENT
Start: 2018-02-26 | End: 2018-09-25 | Stop reason: SDUPTHER

## 2018-02-26 RX ORDER — VALSARTAN 160 MG/1
160 TABLET ORAL DAILY
Qty: 90 TAB | Refills: 1 | Status: SHIPPED | OUTPATIENT
Start: 2018-02-26 | End: 2018-08-21

## 2018-02-26 NOTE — TELEPHONE ENCOUNTER
Patient is needing 4 refill on atorvastatin (LIPITOR) 10 mg tablet  #90 days,furosemide (LASIX) 20 mg tab#90 supplys and valsartan (DIOVAN) 160 mg tablet and Lisinopril 90 days supplys  Also the Diovan 90 days  [phone]

## 2018-02-26 NOTE — TELEPHONE ENCOUNTER
Per wife, patient needs refills on allopurinol, atorvastatin, furosemide, and valsartan for a 90 day supply. New prescription sent as prescribed to the [facility] on file.

## 2018-03-13 ENCOUNTER — HOSPITAL ENCOUNTER (OUTPATIENT)
Dept: LAB | Age: 68
Discharge: HOME | End: 2018-03-13
Payer: MEDICARE

## 2018-03-13 ENCOUNTER — TELEPHONE (OUTPATIENT)
Dept: INTERNAL MEDICINE CLINIC | Age: 68
End: 2018-03-13

## 2018-03-13 ENCOUNTER — OFFICE VISIT (OUTPATIENT)
Dept: INTERNAL MEDICINE CLINIC | Age: 68
End: 2018-03-13

## 2018-03-13 VITALS
TEMPERATURE: 99.2 F | SYSTOLIC BLOOD PRESSURE: 101 MMHG | BODY MASS INDEX: 41.75 KG/M2 | HEIGHT: 73 IN | DIASTOLIC BLOOD PRESSURE: 65 MMHG | HEART RATE: 69 BPM | RESPIRATION RATE: 18 BRPM | OXYGEN SATURATION: 95 % | WEIGHT: 315 LBS

## 2018-03-13 DIAGNOSIS — Z13.31 DEPRESSION SCREEN: ICD-10-CM

## 2018-03-13 DIAGNOSIS — Z23 ENCOUNTER FOR IMMUNIZATION: ICD-10-CM

## 2018-03-13 DIAGNOSIS — H91.90 HEARING LOSS, UNSPECIFIED HEARING LOSS TYPE, UNSPECIFIED LATERALITY: ICD-10-CM

## 2018-03-13 DIAGNOSIS — Z12.11 COLON CANCER SCREENING: ICD-10-CM

## 2018-03-13 DIAGNOSIS — Z13.39 ALCOHOL SCREENING: ICD-10-CM

## 2018-03-13 DIAGNOSIS — Z13.6 SCREENING FOR AAA (ABDOMINAL AORTIC ANEURYSM): ICD-10-CM

## 2018-03-13 DIAGNOSIS — E78.00 PURE HYPERCHOLESTEROLEMIA: Chronic | ICD-10-CM

## 2018-03-13 DIAGNOSIS — Z00.00 WELCOME TO MEDICARE PREVENTIVE VISIT: Primary | ICD-10-CM

## 2018-03-13 DIAGNOSIS — Z12.5 PROSTATE CANCER SCREENING: ICD-10-CM

## 2018-03-13 DIAGNOSIS — I10 ESSENTIAL HYPERTENSION: Chronic | ICD-10-CM

## 2018-03-13 DIAGNOSIS — Z86.010 HX OF ADENOMATOUS COLONIC POLYPS: ICD-10-CM

## 2018-03-13 PROBLEM — E66.01 OBESITY, MORBID (HCC): Status: ACTIVE | Noted: 2018-03-13

## 2018-03-13 PROCEDURE — 80048 BASIC METABOLIC PNL TOTAL CA: CPT

## 2018-03-13 PROCEDURE — 84153 ASSAY OF PSA TOTAL: CPT

## 2018-03-13 PROCEDURE — 80061 LIPID PANEL: CPT

## 2018-03-13 NOTE — TELEPHONE ENCOUNTER
----- Message from Manjit Brody sent at 3/13/2018  1:57 PM EDT -----  Regarding: Dr. Kathy Vines  Pt is calling regarding copy colonoscopy records. The best contact is [phone].

## 2018-03-13 NOTE — PATIENT INSTRUCTIONS
Medicare Part B Preventive Services Limitations Recommendation Scheduled   Bone Mass Measurement  (age 72 & older, biennial) Requires diagnosis related to osteoporosis or estrogen deficiency. Biennial benefit unless patient has history of long-term glucocorticoid tx or baseline is needed because initial test was by other method     Cardiovascular Screening Blood Tests (every 5 years)  Total cholesterol, HDL, Triglycerides Order as a panel if possible     Colorectal Cancer Screening  -Fecal occult blood test (annual)  -Flexible sigmoidoscopy (5y)  -Screening colonoscopy (10y)  -Barium Enema      Counseling to Prevent Tobacco Use (up to 8 sessions per year)  - Counseling greater than 3 and up to 10 minutes  - Counseling greater than 10 minutes Patients must be asymptomatic of tobacco-related conditions to receive as preventive service     Diabetes Screening Tests (at least every 3 years, Medicare covers annually or at 6-month intervals for prediabetic patients)    Fasting blood sugar (FBS) or glucose tolerance test (GTT) Patient must be diagnosed with one of the following:  -Hypertension, Dyslipidemia, obesity, previous impaired FBS or GTT  Or any two of the following: overweight, FH of diabetes, age ? 72, history of gestational diabetes, birth of baby weighing more than 9 pounds     Diabetes Self-Management Training (DSMT) (no USPSTF recommendation) Requires referral by treating physician for patient with diabetes or renal disease. 10 hours of initial DSMT session of no less than 30 minutes each in a continuous 12-month period. 2 hours of follow-up DSMT in subsequent years.      Glaucoma Screening (no USPSTF recommendation) Diabetes mellitus, family history, , age 48 or over,  American, age 72 or over     Human Immunodeficiency Virus (HIV) Screening (annually for increased risk patients)  HIV-1 and HIV-2 by EIA, ELISABET, rapid antibody test, or oral mucosa transudate Patient must be at increased risk for HIV infection per USPSTF guidelines or pregnant. Tests covered annually for patients at increased risk. Pregnant patients may receive up to 3 test during pregnancy. Medical Nutrition Therapy (MNT) (for diabetes or renal disease not recommended schedule) Requires referral by treating physician for patient with diabetes or renal disease. Can be provided in same year as diabetes self-management training (DSMT), and CMS recommends medical nutrition therapy take place after DSMT. Up to 3 hours for initial year and 2 hours in subsequent years. Prostate Cancer Screening (annually up to age 76)  - Digital rectal exam (RADHA)  - Prostate specific antigen (PSA) Annually (age 48 or over), RADHA not paid separately when covered E/M service is provided on same date  Men up to age 76 may need a screening blood test for prostate cancer at certain intervals, depending on their personal and family history. This decision is between the patient and his provider. Seasonal Influenza Vaccination (annually)        Pneumococcal Vaccination (once after 72)      Hepatitis B Vaccinations (if medium/high risk) Medium/high risk factors:  End-stage renal disease,  Hemophiliacs who received Factor VIII or IX concentrates, Clients of institutions for the mentally retarded, Persons who live in the same house as a HepB virus carrier, Homosexual men, Illicit injectable drug abusers. Shingles Vaccination A shingles vaccine is also recommended once in a lifetime after age 61     Ultrasound Screening for Abdominal Aortic Aneurysm (AAA) (once) Patient must be referred through [facility] and not have had a screening for abdominal aortic aneurysm before under Medicare.   Limited to patients who meet one of the following criteria:  - Men who are 73-68 years old and have smoked more than 100 cigarettes in their lifetime.  -Anyone with a FH of AAA  -Anyone recommended for screening by USPSTF       [email or web address]

## 2018-03-13 NOTE — PROGRESS NOTES
HISTORY OF PRESENT ILLNESS  Armin Carmona is a 74 y.o. male. HPI  This is a Welcome to medicare physical    Patient Active Problem List   Diagnosis Code    Hypertension I10    Hyperlipidemia E78.5    Gout M10.9    Multinodular goiter E04.2    Melanoma ([facility] [address] C43.9    Hx of adenomatous colonic polyps Z86.010    Obesity, morbid ([facility] [address] E66.01     Past Surgical History:   Procedure Laterality Date    HX HEENT Left     partial thyroidectomy    HX ORTHOPAEDIC  1969    Left knee ACL     HX ORTHOPAEDIC  1979    dupytrens contractures    HX THYROIDECTOMY  05/2013    HX TONSILLECTOMY  1950    SKIN TISSUE PROCEDURE UNLISTED       Social History     Social History    Marital status:      Spouse name: N/A    Number of children: N/A    Years of education: N/A     Occupational History    Not on file. Social History Main Topics    Smoking status: Never Smoker    Smokeless tobacco: Never Used    Alcohol use 0.6 oz/week     0 Glasses of wine, 1 Cans of beer per week    Drug use: No    Sexual activity: Not Currently     Partners: Female     Other Topics Concern    Not on file     Social History Narrative     Family History   Problem Relation Age of Onset    Diabetes Mother     Heart Disease Mother      heart valve replaced    Hypertension Father     Colon Polyps Father     Cancer Brother      Stomach      Current Outpatient Prescriptions   Medication Sig    varicella-zoster recombinant, PF, (SHINGRIX, PF,) 50 mcg/0.5 mL susr injection 0.5 mL by IntraMUSCular route once for 1 dose.  pneumococcal 13 poppy conj dip (PREVNAR-13) 0.5 mL syrg injection 0.5 mL by IntraMUSCular route once for 1 dose.  furosemide (LASIX) 20 mg tablet Take 1 Tab by mouth daily.  atorvastatin (LIPITOR) 10 mg tablet Take 1 Tab by mouth every evening.  valsartan (DIOVAN) 160 mg tablet Take 1 Tab by mouth daily.  allopurinol (ZYLOPRIM) 300 mg tablet Take 1 Tab by mouth daily.     triamcinolone acetonide (KENALOG) 0.1 % topical cream Apply  to affected area two (2) times a day. use thin layer    ketoconazole (NIZORAL) 2 % topical cream Apply  to affected area two (2) times a day. Use thin layer under each axilla for rash for 10-14 days.  verapamil ER (CALAN-SR) 240 mg CR tablet Take 1 Tab by mouth two (2) times a day.  cholecalciferol, vitamin D3, 2,000 unit tab Take  by mouth.  Omega-3 Fatty Acids 300 mg cap Take  by mouth.  calcium 500 mg tab Take  by mouth. With potassium    magnesium 250 mg tab Take 400 mg by mouth daily.  indomethacin SR (INDOCIN SR) 75 mg SR capsule Take 75 mg by mouth daily as needed. No current facility-administered medications for this visit. No Known Allergies  Immunization History   Administered Date(s) Administered    Influenza High Dose Vaccine PF 10/05/2016, 11/15/2017    Influenza Vaccine 10/01/2015, 11/24/2015    Tdap 11/24/2014    Zoster Vaccine, Live 01/21/2016       Depression scale assessment:  PHQ over the last two weeks 3/13/2018   Little interest or pleasure in doing things Not at all   Feeling down, depressed or hopeless Not at all   Total Score PHQ 2 0       Alcohol screening assessment  You do not drink alcohol or very rarely. Functional assessment/ safety  Hearing Loss   The patient needs further evaluation. Activities of Daily Living   Self-care. Requires assistance with: no ADLs    Fall Risk     Fall Risk Assessment, last 12 mths 3/13/2018   Able to walk? Yes   Fall in past 12 months? No       Abuse Risk:  Patient is not abused    Advanced Medical Directive screening:  Armin Carmona does have an AMD on file in this chart. Health maintenance hx includes:  Exercise: not active.   Form of exercise: yardwork   Diet: not attempting to follow a low fat, low cholesterol diet, exercises sporadically, nonsmoker    Cancer screening:    Colon cancer screening:  Last Colonoscopy: 2013 and was normal     Men only: PSA testing 2017 and was within normal limits       Review of Systems   Constitutional: Negative for malaise/fatigue and weight loss. HENT: Negative for sore throat. Eyes: Negative for blurred vision and pain. Respiratory: Negative for cough, shortness of breath and wheezing. Cardiovascular: Negative for chest pain, palpitations and leg swelling. Gastrointestinal: Negative for constipation, diarrhea, heartburn, nausea and vomiting. Genitourinary: Negative for dysuria and hematuria. Musculoskeletal: Negative for back pain, falls, joint pain and myalgias. Skin: Negative for rash. Neurological: Negative for dizziness and headaches. Psychiatric/Behavioral: Negative for depression. The patient is not nervous/anxious. Physical Exam   Constitutional: He is oriented to person, place, and time. He appears well-developed and well-nourished. No distress. Body mass index is 46.49 kg/(m^2). /65 (BP 1 Location: Right arm, BP Patient Position: Sitting)  Pulse 69  Temp 99.2 °F (37.3 °C) (Oral)   Resp 18  Ht 6' 1\" (1.854 m)  Wt (!) 352 lb 6 oz (159.8 kg)  SpO2 95%  BMI 46.49 kg/m2   HENT:   Head: Normocephalic and atraumatic. Right Ear: Hearing, tympanic membrane and ear canal normal.   Left Ear: Hearing, tympanic membrane and ear canal normal.   Nose: Nose normal.   Mouth/Throat: Oropharynx is clear and moist and mucous membranes are normal. Normal dentition. Eyes: Conjunctivae and lids are normal. Pupils are equal, round, and reactive to light. Right eye exhibits no discharge. Left eye exhibits no discharge. No scleral icterus. Neck: Trachea normal. No thyromegaly present. Cardiovascular: Normal rate, regular rhythm, normal heart sounds, intact distal pulses and normal pulses. Exam reveals no gallop and no friction rub. No murmur heard. Pulmonary/Chest: Effort normal and breath sounds normal. No respiratory distress. Abdominal: Soft.  Normal appearance and bowel sounds are normal. He exhibits no distension and no mass. There is no hepatosplenomegaly. There is no tenderness. There is no CVA tenderness. Musculoskeletal: Normal range of motion. He exhibits no edema or tenderness. Lymphadenopathy:     He has no cervical adenopathy. Neurological: He is alert and oriented to person, place, and time. Skin: Skin is warm and dry. No rash noted. He is not diaphoretic. Psychiatric: He has a normal mood and affect. His speech is normal and behavior is normal. Judgment and thought content normal. Cognition and memory are normal.   Nursing note and vitals reviewed. ASSESSMENT and PLAN  Diagnoses and all orders for this visit:    1. Welcome to Medicare preventive visit  -     AMB POC EKG ROUTINE W/ 12 LEADS, INTER & REP  -     US EXAM SCREENING AAA; Future  Clark Guevara was counseled on age-appropriate/ guideline-based risk prevention behaviors and screening for a 74y.o. year old   male . We also discussed adjustments in screening based on family history if necessary. Printed instructions for preventative screening guidelines and healthy behaviors given to patient with after visit summary. 2. Encounter for immunization  -     varicella-zoster recombinant, PF, (SHINGRIX, PF,) 50 mcg/0.5 mL susr injection; 0.5 mL by IntraMUSCular route once for 1 dose. -     pneumococcal 13 poppy conj dip (PREVNAR-13) 0.5 mL syrg injection; 0.5 mL by IntraMUSCular route once for 1 dose. 3. Hearing loss, unspecified hearing loss type, unspecified laterality  -     REFERRAL TO ENT-OTOLARYNGOLOGY    4. Colon cancer screening  he was advised to have follow up colonoscopy in 2018    -     [facility]    5. Pure hypercholesterolemia -recheck  -     LIPID PANEL    6. Essential hypertension -Well controlled and stable. his medications were reviewed and refilled where necessary as noted below. Labs ordered as noted. Log blood pressures at home while sitting, relaxed 3-5 times weekly and bring to next visit.   Pt educated on goal BP of 130/80 on average or lower. Call office as soon as possible if BP's over 140/90 or below 110/50 on multiple occasions and/or with symptoms of dizziness, chest pain, shortness of breath, headache or ankle swelling. Recheck log and bp here in 6 month(s).    -     METABOLIC PANEL, BASIC    7. Prostate cancer screening  -     PROSTATE SPECIFIC AG    8. Hx of adenomatous colonic polyps  he was advised to have follow up colonoscopy in 2018      9. Screening for AAA (abdominal aortic aneurysm)  -     US EXAM SCREENING AAA; Future    10. Depression screen    11. Alcohol screening      Follow-up Disposition:  Return in about 6 months (around 9/13/2018).

## 2018-03-13 NOTE — MR AVS SNAPSHOT
Sophie Pulse 
 
 
 [address] [place] [address] [facility] 
[phone] Patient: Jeffrey Conway 
MRN: [id number] AGF:80/[id number] Visit Information Date & Time Provider Department Dept. Phone Encounter #  
 3/13/2018 10:15 AM Adán Enriquez MD [facility] [address] [facility] [id number] Follow-up Instructions Return in about 6 months (around 9/13/2018). Your Appointments 4/24/2018 11:00 AM  
Office Visit with Halford Fabry, NP [facility]) Appt Note: 3 month skin exam  
 [facility] [address] [facility] [id number] [place] [address] [address] [facility] [address] Upcoming Health Maintenance Date Due  
 GLAUCOMA SCREENING Q2Y 10/23/2017 Bone Densitometry (Dexa) Screening 10/23/2017 Pneumococcal 65+ High/Highest Risk (1 of 2 - PCV13) 10/23/2017 MEDICARE YEARLY EXAM 10/23/2017 COLONOSCOPY 6/12/2018 DTaP/Tdap/Td series (2 - Td) 11/24/2024 Allergies as of 3/13/2018  Review Complete On: 3/13/2018 By: Adán Enriquez MD  
 No Known Allergies Current Immunizations  Reviewed on 3/13/2018 Name Date Influenza High Dose Vaccine PF 11/15/2017, 10/5/2016 Influenza Vaccine 11/24/2015, 10/1/2015 Tdap 11/24/2014 Zoster Vaccine, Live 1/21/2016 Reviewed by Adán Enriquez MD on 3/13/2018 at 10:30 AM  
 Reviewed by Adán Enriquez MD on 3/13/2018 at 10:31 AM  
 Reviewed by Adán Enriquez MD on 3/13/2018 at 10:32 AM  
You Were Diagnosed With   
  
 Codes Comments Welcome to Medicare preventive visit    -  Primary ICD-10-CM: Z00.00 ICD-9-CM: V70.0 Encounter for immunization     ICD-10-CM: B23 ICD-9-CM: V03.89 Hearing loss, unspecified hearing loss type, unspecified laterality     ICD-10-CM: H91.90 ICD-9-CM: 389.9 Colon cancer screening     ICD-10-CM: Z12.11 ICD-9-CM: V76.51   
 Pure hypercholesterolemia     ICD-10-CM: E78.00 ICD-9-CM: 272.0 Essential hypertension     ICD-10-CM: I10 
ICD-9-CM: 401.9 Prostate cancer screening     ICD-10-CM: Z12.5 ICD-9-CM: V76.44 Hx of adenomatous colonic polyps     ICD-10-CM: Z86.010 
ICD-9-CM: V12.72 Screening for AAA (abdominal aortic aneurysm)     ICD-10-CM: Z13.6 ICD-9-CM: V81.2 Vitals BP Pulse Temp Resp Height(growth percentile) Weight(growth percentile) 101/65 (BP 1 Location: Right arm, BP Patient Position: Sitting) 69 99.2 °F (37.3 °C) (Oral) 18 6' 1\" (1.854 m) (!) 352 lb 6 oz (159.8 kg) SpO2 BMI Smoking Status 95% 46.49 kg/m2 Never Smoker Vitals History BMI and BSA Data Body Mass Index Body Surface Area  
 46.49 kg/m 2 2.87 m 2 Preferred Pharmacy Pharmacy Name Phone [id number] [place] [phone] Your Updated Medication List  
  
   
This list is accurate as of 3/13/18 10:51 AM.  Always use your most recent med list.  
  
  
  
  
 allopurinol 300 mg tablet Commonly known as:  [place] Take 1 Tab by mouth daily. atorvastatin 10 mg tablet Commonly known as:  LIPITOR Take 1 Tab by mouth every evening. calcium 500 mg Tab Take  by mouth. With potassium  
  
 cholecalciferol (vitamin D3) 2,000 unit Tab Take  by mouth. furosemide 20 mg tablet Commonly known as:  LASIX Take 1 Tab by mouth daily. indomethacin SR 75 mg SR capsule Commonly known as:  INDOCIN SR Take 75 mg by mouth daily as needed. ketoconazole 2 % topical cream  
Commonly known as:  NIZORAL Apply  to affected area two (2) times a day. Use thin layer under each axilla for rash for 10-14 days. magnesium 250 mg Tab Take 400 mg by mouth daily. Omega-3 Fatty Acids 300 mg Cap Take  by mouth.  
  
 pneumococcal 13 poppy conj dip 0.5 mL Syrg injection Commonly known as:  PREVNAR-13  
 0.5 mL by IntraMUSCular route once for 1 dose. triamcinolone acetonide 0.1 % topical cream  
Commonly known as:  KENALOG Apply  to affected area two (2) times a day. use thin layer  
  
 valsartan 160 mg tablet Commonly known as:  DIOVAN Take 1 Tab by mouth daily. varicella-zoster recombinant (PF) 50 mcg/0.5 mL Susr injection Commonly known as:  SHINGRIX (PF)  
0.5 mL by IntraMUSCular route once for 1 dose. verapamil  mg CR tablet Commonly known as:  CALAN-SR Take 1 Tab by mouth two (2) times a day. Prescriptions Printed Refills  
 varicella-zoster recombinant, PF, (SHINGRIX, PF,) 50 mcg/0.5 mL susr injection 0 Si.5 mL by IntraMUSCular route once for 1 dose. Class: Print Route: IntraMUSCular  
 pneumococcal 13 poppy conj dip (PREVNAR-13) 0.5 mL syrg injection 0 Si.5 mL by IntraMUSCular route once for 1 dose. Class: Print Route: IntraMUSCular We Performed the Following AMB POC EKG ROUTINE  LEADS, INTER & REP [92208 CPT(R)] LIPID PANEL [80630 CPT(R)] METABOLIC PANEL, BASIC [01305 CPT(R)] PSA, DIAGNOSTIC (PROSTATE SPECIFIC AG) [id number] CPT(R)] REFERRAL TO ENT-OTOLARYNGOLOGY [UNL17 Custom] REFERRAL TO GASTROENTEROLOGY [DVU66 Custom] Follow-up Instructions Return in about 6 months (around 2018). To-Do List   
 2018 Imaging:  US EXAM SCREENING AAA Referral Information Referral ID Referred By Referred To  
  
 [id number] [facility] [address] [place] [address] [phone] [place], [address] [facility] Visits Status Start Date End Date 1 New Request 3/13/18 3/13/19 If your referral has a status of pending review or denied, additional information will be sent to support the outcome of this decision. Referral ID Referred By Referred To  
 [id number] CARMELINA, [facility] [address] [facility] [address]  [place], [address] [facility] Visits Status Start Date End Date 1 New Request 3/13/18 3/13/19 If your referral has a status of pending review or denied, additional information will be sent to support the outcome of this decision. Patient Instructions Medicare Part B Preventive Services Limitations Recommendation Scheduled Bone Mass Measurement 
(age 72 & older, biennial) Requires diagnosis related to osteoporosis or estrogen deficiency. Biennial benefit unless patient has history of long-term glucocorticoid tx or baseline is needed because initial test was by other method Cardiovascular Screening Blood Tests (every 5 years) Total cholesterol, HDL, Triglycerides Order as a panel if possible Colorectal Cancer Screening 
-Fecal occult blood test (annual) -Flexible sigmoidoscopy (5y) 
-Screening colonoscopy (10y) -Barium Enema Counseling to Prevent Tobacco Use (up to 8 sessions per year) - Counseling greater than 3 and up to 10 minutes - Counseling greater than 10 minutes Patients must be asymptomatic of tobacco-related conditions to receive as preventive service Diabetes Screening Tests (at least every 3 years, Medicare covers annually or at 6-month intervals for prediabetic patients) Fasting blood sugar (FBS) or glucose tolerance test (GTT) Patient must be diagnosed with one of the following: 
-Hypertension, Dyslipidemia, obesity, previous impaired FBS or GTT 
Or any two of the following: overweight, FH of diabetes, age ? 72, history of gestational diabetes, birth of baby weighing more than 9 pounds Diabetes Self-Management Training (DSMT) (no USPSTF recommendation) Requires referral by treating physician for patient with diabetes or renal disease. 10 hours of initial DSMT session of no less than 30 minutes each in a continuous 12-month period. 2 hours of follow-up DSMT in subsequent years. Glaucoma Screening (no USPSTF recommendation) Diabetes mellitus, family history, , age 48 or over,  American, age 72 or over Human Immunodeficiency Virus (HIV) Screening (annually for increased risk patients) HIV-1 and HIV-2 by EIA, ELISABET, rapid antibody test, or oral mucosa transudate Patient must be at increased risk for HIV infection per USPSTF guidelines or pregnant. Tests covered annually for patients at increased risk. Pregnant patients may receive up to 3 test during pregnancy. Medical Nutrition Therapy (MNT) (for diabetes or renal disease not recommended schedule) Requires referral by treating physician for patient with diabetes or renal disease. Can be provided in same year as diabetes self-management training (DSMT), and CMS recommends medical nutrition therapy take place after DSMT. Up to 3 hours for initial year and 2 hours in subsequent years. Prostate Cancer Screening (annually up to age 76) - Digital rectal exam (RADHA) - Prostate specific antigen (PSA) Annually (age 48 or over), RADHA not paid separately when covered E/M service is provided on same date Men up to age 76 may need a screening blood test for prostate cancer at certain intervals, depending on their personal and family history. This decision is between the patient and his provider. Seasonal Influenza Vaccination (annually) Pneumococcal Vaccination (once after 65) Hepatitis B Vaccinations (if medium/high risk) Medium/high risk factors:  End-stage renal disease, Hemophiliacs who received Factor VIII or IX concentrates, Clients of institutions for the mentally retarded, Persons who live in the same house as a HepB virus carrier, Homosexual men, Illicit injectable drug abusers. Shingles Vaccination A shingles vaccine is also recommended once in a lifetime after age 61 Ultrasound Screening for Abdominal Aortic Aneurysm (AAA) (once) Patient must be referred through [facility] and not have had a screening for abdominal aortic aneurysm before under Medicare. Limited to patients who meet one of the following criteria: 
- Men who are 73-68 years old and have smoked more than 100 cigarettes in their lifetime. 
-Anyone with a FH of AAA 
-Anyone recommended for screening by USPSTF    
 
[email or web address] 
 
 
  
Introducing [facility]! [address] [facility] introduces WeTag patient portal. Now you can access parts of your medical record, email your doctor's office, and request medication refills online. 1. In your internet browser, go to [email or web address] 2. Click on the First Time User? Click Here link in the Sign In box. You will see the New Member Sign Up page. 3. Enter your WeTag Access Code exactly as it appears below. You will not need to use this code after youve completed the sign-up process. If you do not sign up before the expiration date, you must request a new code. · WeTag Access Code: [email or web address] Expires: 4/9/2018 12:31 PM 
 
4. Enter the last four digits of your Social Security Number (xxxx) and Date of Birth (mm/dd/yyyy) as indicated and click Submit. You will be taken to the next sign-up page. 5. Create a WeTag ID. This will be your WeTag login ID and cannot be changed, so think of one that is secure and easy to remember. 6. Create a WeTag password. You can change your password at any time. 7. Enter your Password Reset Question and Answer. This can be used at a later time if you forget your password. 8. Enter your e-mail address. You will receive e-mail notification when new information is available in [address] 9. Click Sign Up. You can now view and download portions of your medical record. 10. Click the Download Summary menu link to download a portable copy of your medical information.  
 
If you have questions, please visit the Frequently Asked Questions section of the Ent[employer]. Remember, [employer] is NOT to be used for urgent needs. For medical emergencies, dial 911. Now available from your iPhone and Android! Please provide this summary of care documentation to your next provider. Your primary care clinician is listed as Dairela Isabel. If you have any questions after today's visit, please call [phone].

## 2018-03-14 LAB
BUN SERPL-MCNC: 18 MG/DL (ref 8–27)
BUN/CREAT SERPL: 15 (ref 10–24)
CALCIUM SERPL-MCNC: 9 MG/DL (ref 8.6–10.2)
CHLORIDE SERPL-SCNC: 99 MMOL/L (ref 96–106)
CHOLEST SERPL-MCNC: 120 MG/DL (ref 100–199)
CO2 SERPL-SCNC: 27 MMOL/L (ref 18–29)
CREAT SERPL-MCNC: 1.21 MG/DL (ref 0.76–1.27)
GFR SERPLBLD CREATININE-BSD FMLA CKD-EPI: 62 ML/MIN/1.73
GFR SERPLBLD CREATININE-BSD FMLA CKD-EPI: 72 ML/MIN/1.73
GLUCOSE SERPL-MCNC: 91 MG/DL (ref 65–99)
HDLC SERPL-MCNC: 41 MG/DL
INTERPRETATION, 910389: NORMAL
LDLC SERPL CALC-MCNC: 61 MG/DL (ref 0–99)
POTASSIUM SERPL-SCNC: 4.8 MMOL/L (ref 3.5–5.2)
PSA SERPL-MCNC: 0.4 NG/ML (ref 0–4)
SODIUM SERPL-SCNC: 141 MMOL/L (ref 134–144)
TRIGL SERPL-MCNC: 91 MG/DL (ref 0–149)
VLDLC SERPL CALC-MCNC: 18 MG/DL (ref 5–40)

## 2018-03-20 ENCOUNTER — HOSPITAL ENCOUNTER (OUTPATIENT)
Dept: ULTRASOUND IMAGING | Age: 68
Discharge: HOME | End: 2018-03-20
Attending: INTERNAL MEDICINE
Payer: MEDICARE

## 2018-03-20 DIAGNOSIS — Z00.00 WELCOME TO MEDICARE PREVENTIVE VISIT: ICD-10-CM

## 2018-03-20 DIAGNOSIS — Z13.6 SCREENING FOR AAA (ABDOMINAL AORTIC ANEURYSM): ICD-10-CM

## 2018-03-20 PROCEDURE — 76706 US ABDL AORTA SCREEN AAA: CPT

## 2018-04-24 ENCOUNTER — OFFICE VISIT (OUTPATIENT)
Dept: DERMATOLOGY | Facility: AMBULATORY SURGERY CENTER | Age: 68
End: 2018-04-24

## 2018-04-24 VITALS
HEART RATE: 69 BPM | DIASTOLIC BLOOD PRESSURE: 74 MMHG | TEMPERATURE: 99.1 F | OXYGEN SATURATION: 93 % | BODY MASS INDEX: 41.75 KG/M2 | RESPIRATION RATE: 16 BRPM | HEIGHT: 73 IN | WEIGHT: 315 LBS | SYSTOLIC BLOOD PRESSURE: 110 MMHG

## 2018-04-24 DIAGNOSIS — Z85.828 HISTORY OF NONMELANOMA SKIN CANCER: ICD-10-CM

## 2018-04-24 DIAGNOSIS — D22.9 MULTIPLE BENIGN NEVI: ICD-10-CM

## 2018-04-24 DIAGNOSIS — L81.4 LENTIGINES: ICD-10-CM

## 2018-04-24 DIAGNOSIS — L82.1 OTHER SEBORRHEIC KERATOSIS: ICD-10-CM

## 2018-04-24 DIAGNOSIS — R60.0 EDEMA OF BOTH LEGS: ICD-10-CM

## 2018-04-24 DIAGNOSIS — D18.01 CHERRY ANGIOMA: ICD-10-CM

## 2018-04-24 DIAGNOSIS — L91.8 SKIN TAG: ICD-10-CM

## 2018-04-24 DIAGNOSIS — Z85.820 PERSONAL HISTORY OF MALIGNANT MELANOMA OF SKIN: Primary | ICD-10-CM

## 2018-04-24 NOTE — MR AVS SNAPSHOT
[address] [facility] [address] 
[phone] Patient: Orly Villafana 
MRN: [id number] Visit Information Date & Time Provider Department Dept. Phone Encounter #  
 4/24/2018 11:00 AM Marianela Pereira NP [place] [address] [phone] [id number] Your Appointments 9/21/2018  1:15 PM  
ROUTINE CARE with Johnathon Casillas MD  
[facility]) Appt Note: 6 month f/u tr 3/13/18  
 [address] [facility] [address] [address]  
[phone]  
  
   
 [address] [facility] [address] Upcoming Health Maintenance Date Due  
 GLAUCOMA SCREENING Q2Y 10/23/2017 Pneumococcal 65+ High/Highest Risk (1 of 2 - PCV13) 10/23/2017 COLONOSCOPY 6/12/2018 MEDICARE YEARLY EXAM 3/14/2019 DTaP/Tdap/Td series (2 - Td) 11/24/2024 Allergies as of 4/24/2018  Review Complete On: 4/24/2018 By: Mode Donald LPN No Known Allergies Current Immunizations  Reviewed on 3/13/2018 Name Date Influenza High Dose Vaccine PF 11/15/2017, 10/5/2016 Influenza Vaccine 11/24/2015, 10/1/2015 Tdap 11/24/2014 Zoster Vaccine, Live 1/21/2016 Not reviewed this visit Vitals BP Pulse Temp Resp Height(growth percentile) Weight(growth percentile) 110/74 (BP 1 Location: Right arm, BP Patient Position: Sitting) 69 99.1 °F (37.3 °C) (Oral) 16 6' 1\" (1.854 m) (!) 352 lb (159.7 kg) SpO2 BMI Smoking Status 93% 46.44 kg/m2 Never Smoker BMI and BSA Data Body Mass Index Body Surface Area  
 46.44 kg/m 2 2.87 m 2 Preferred Pharmacy Pharmacy Name Phone [id number] [address] [phone] Your Updated Medication List  
  
   
This list is accurate as of 4/24/18 11:18 AM.  Always use your most recent med list.  
  
  
  
  
 allopurinol 300 mg tablet Commonly known as:  Lesvia Viktoriya Take 1 Tab by mouth daily. atorvastatin 10 mg tablet Commonly known as:  LIPITOR Take 1 Tab by mouth every evening. calcium 500 mg Tab Take  by mouth. With potassium  
  
 cholecalciferol (vitamin D3) 2,000 unit Tab Take  by mouth. furosemide 20 mg tablet Commonly known as:  LASIX Take 1 Tab by mouth daily. indomethacin SR 75 mg SR capsule Commonly known as:  INDOCIN SR Take 75 mg by mouth daily as needed. ketoconazole 2 % topical cream  
Commonly known as:  NIZORAL Apply  to affected area two (2) times a day. Use thin layer under each axilla for rash for 10-14 days. magnesium 250 mg Tab Take 400 mg by mouth daily. Omega-3 Fatty Acids 300 mg Cap Take  by mouth.  
  
 triamcinolone acetonide 0.1 % topical cream  
Commonly known as:  KENALOG Apply  to affected area two (2) times a day. use thin layer  
  
 valsartan 160 mg tablet Commonly known as:  DIOVAN Take 1 Tab by mouth daily. verapamil  mg CR tablet Commonly known as:  CALAN-SR Take 1 Tab by mouth two (2) times a day. Patient Instructions Self Skin Exam and Sunscreens Early detection and treatment is essential in the treatment of all forms of skin cancer. If caught early, all forms of skin cancer are curable. In addition to your regular visits, you should perform a monthly skin examination. Over time, you become familiar with what is normally found on your skin and can identify new or suspicious spots. One of the screening tools you can use to assess your skin is to follow the ABCDEs: 
 
A= Asymmetry (One half is unlike the other half) B= Border (An irregular, scalloped or poorly defined edge) C= Color (Is varied from one area to another, has shades of tan, brown/ black,       white, red or blue) D= Diameter (Spots larger than 6mm or a pencil eraser) E= Evolving (New spots or one that is changing in size, shape, or color) A follow- up interval will be customized based on your history of skin cancer or level of skin damage and risk factors. In any case, if you notice a suspicious or new spot, an appointment should be arranged between regular visits. Everyone should use sunscreen and sun-safe practices, which is especially important for those with a personal or family history of skin cancer. Suggestions for this include: 1. Use daily moisturizers containing SPF 30 or higher. 2. Wear long sleeve clothing with UPF ratings and a broad-brimmed hat. 3. Apply sunscreen with SPF 30 or higher to all sun exposed areas if you are going to be in the sun. A broad spectrum UVA/ UVB sunscreen is best.  Dont forget to REAPPLY every two hours or more often if swimming or sweating! 4. Avoid outside activities during peak sun hours, especially in the summer (10am- 2pm). 5. DO NOT use tanning beds. Using sunscreen and sun-safe practices can help reduce the likelihood of developing skin cancer or additional skin cancers in those previously diagnosed. Introducing [facility] [place] [facility] HE[facility] SERVICES! Dear Shilpi Hendrix: 
Thank you for requesting a WaveMAX account. Our records indicate that you already have an active WaveMAX account. You can access your account anytime at [email or web address] Did you know that you can access your hospital and ER discharge instructions at any time in WaveMAX? You can also review all of your test results from your hospital stay or ER visit. Additional Information If you have questions, please visit the Frequently Asked Questions section of the WaveMAX website at [email or web address] Remember, WaveMAX is NOT to be used for urgent needs. For medical emergencies, dial 911. Now available from your iPhone and Android! Please provide this summary of care documentation to your next provider. Your primary care clinician is listed as Tiffany العلي. If you have any questions after today's visit, please call [phone].

## 2018-04-24 NOTE — PROGRESS NOTES
Written by Laisha Morales, as dictated by Rory Acosta, [address]. Name: Kelli Young       Age: 74 y.o. Date: 4/24/2018    Chief Complaint:   Chief Complaint   Patient presents with    Skin Exam       Subjective:    HPI  Mr. Kelli Young is a 74 y.o. male who presents for a full skin exam.  The patient's last skin exam was on 1/9/18 and the patient does have current complaints related to his skin. He would like the lesions on his right axilla evaluated. He states he has stopped the use of polyester which has helped his rash in addition to the topicals. The patient's pertinent skin history includes : Melanoma stage 1a of the left posterior shoulder, Breslow 0.3mm tx 2015; BCCs of the left posterior shoulder, scalp, right popliteal area    ROS: Constitutional: Negative. Dermatological : negative      Social History     Social History    Marital status:      Spouse name: N/A    Number of children: N/A    Years of education: N/A     Occupational History    Not on file.      Social History Main Topics    Smoking status: Never Smoker    Smokeless tobacco: Never Used    Alcohol use 0.6 oz/week     0 Glasses of wine, 1 Cans of beer per week    Drug use: No    Sexual activity: Not Currently     Partners: Female     Other Topics Concern    Not on file     Social History Narrative       Family History   Problem Relation Age of Onset    Diabetes Mother     Heart Disease Mother      heart valve replaced    Hypertension Father     Colon Polyps Father     Cancer Brother      Stomach        Past Medical History:   Diagnosis Date    Cancer ([facility] [address]     melanoma     Gout     Hypercholesterolemia     Hypertension     Skin cancer     melanoma (left post shoulder) and BCC x 2 (scalp, right popliteal)    Sun-damaged skin     Thyroid disease        Past Surgical History:   Procedure Laterality Date    HX HEENT Left     partial thyroidectomy    HX ORTHOPAEDIC  1969    Left knee ACL     HX ORTHOPAEDIC  1979    dupytrens contractures    HX THYROIDECTOMY  05/2013    HX TONSILLECTOMY  1950    SKIN TISSUE PROCEDURE UNLISTED         Current Outpatient Prescriptions   Medication Sig Dispense Refill    atorvastatin (LIPITOR) 10 mg tablet Take 1 Tab by mouth every evening. 90 Tab 1    valsartan (DIOVAN) 160 mg tablet Take 1 Tab by mouth daily. 90 Tab 1    allopurinol (ZYLOPRIM) 300 mg tablet Take 1 Tab by mouth daily. 90 Tab 1    triamcinolone acetonide (KENALOG) 0.1 % topical cream Apply  to affected area two (2) times a day. use thin layer 45 g 3    ketoconazole (NIZORAL) 2 % topical cream Apply  to affected area two (2) times a day. Use thin layer under each axilla for rash for 10-14 days. 60 g 3    verapamil ER (CALAN-SR) 240 mg CR tablet Take 1 Tab by mouth two (2) times a day. 180 Tab 1    cholecalciferol, vitamin D3, 2,000 unit tab Take  by mouth.  Omega-3 Fatty Acids 300 mg cap Take  by mouth.  calcium 500 mg tab Take  by mouth. With potassium      magnesium 250 mg tab Take 400 mg by mouth daily.  indomethacin SR (INDOCIN SR) 75 mg SR capsule Take 75 mg by mouth daily as needed.  furosemide (LASIX) 20 mg tablet Take 1 Tab by mouth daily. 90 Tab 1       No Known Allergies      Objective:    Visit Vitals    /74 (BP 1 Location: Right arm, BP Patient Position: Sitting)    Pulse 69    Temp 99.1 °F (37.3 °C) (Oral)    Resp 16    Ht 6' 1\" (1.854 m)    Wt (!) 352 lb (159.7 kg)    SpO2 93%    BMI 46.44 kg/m2       Homer Milian is a 74 y.o. male who appears well and in no distress. He is awake, alert, and oriented. There is no preauricular, submandibular, or cervical lymphadenopathy. A skin examination was performed including his scalp, face (including eyelids), ears, neck, chest, back, abdomen, upper extremities (including digits/nails), lower extremities (thighs to ankles), breasts.     He has well healed surgical sites on his left posterior shoulder, scalp, and right popliteal area without evidence of lesion recurrences. There are pink intradermal nevi and brown junctional nevi, no concerning features. He has scattered red papules consistent with cherry angiomas. He has scattered skin tags. There are lentigines on sun exposed areas. There are scattered waxy macules and keratotic papules consistent with seborrheic keratoses. He has edema in both legs. There are pustules on the face and trunk. The melanoma scar on the left posterior shoulder is without nodularity or pigment in the scar or surrounding skin. No associated left axillary lymphadenopathy. Assessment/Plan:    1. Personal history of melanoma and non-melanoma skin cancer. I discussed sun protection, sunscreen use, the warning signs of skin cancer, the need for self-skin examinations, and the need for regular practitioner exams every 3 months. The patient should follow up sooner as needed if new, changing, or symptomatic skin lesions arise. 2. Normal nevi. The diagnosis of normal nevi was reviewed. I discussed sun protection, sunscreen use, the warning signs of skin cancer, the need for self-skin examinations, and the need for regular practitioner exams every 3 months. The patient should follow up sooner as needed if new, changing, or symptomatic skin lesions arise. 3. Cherry angiomas. The diagnosis was reviewed and the patient was reassured that no treatment is needed for these benign lesions. 4. Skin tags. The diagnosis was reviewed and the patient was reassured that no treatment is needed for these benign lesions. 5. Solar lentigos. The diagnosis and relationship to sun exposure was reviewed. Sun protection advised. 6. Seborrheic keratoses. The diagnosis was reviewed and the patient was reassured that no treatment is needed for these benign lesions. 7. Edema, both legs. The diagnosis was discussed. This plan was reviewed with the patient and patient agrees. All questions were answered. This scribe documentation was reviewed by me and accurately reflects the examination and decisions made by me.

## 2018-04-27 DIAGNOSIS — I10 ESSENTIAL HYPERTENSION: Chronic | ICD-10-CM

## 2018-04-29 RX ORDER — VERAPAMIL HYDROCHLORIDE 240 MG/1
TABLET, FILM COATED, EXTENDED RELEASE ORAL
Qty: 180 TAB | Refills: 1 | Status: SHIPPED | OUTPATIENT
Start: 2018-04-29 | End: 2018-10-21 | Stop reason: SDUPTHER

## 2018-05-29 ENCOUNTER — ANESTHESIA (OUTPATIENT)
Dept: ENDOSCOPY | Age: 68
End: 2018-05-29
Payer: MEDICARE

## 2018-05-29 ENCOUNTER — HOSPITAL ENCOUNTER (OUTPATIENT)
Age: 68
Setting detail: OUTPATIENT SURGERY
Discharge: HOME | End: 2018-05-29
Attending: INTERNAL MEDICINE | Admitting: INTERNAL MEDICINE
Payer: MEDICARE

## 2018-05-29 ENCOUNTER — ANESTHESIA EVENT (OUTPATIENT)
Dept: ENDOSCOPY | Age: 68
End: 2018-05-29
Payer: MEDICARE

## 2018-05-29 VITALS
HEIGHT: 73 IN | SYSTOLIC BLOOD PRESSURE: 101 MMHG | OXYGEN SATURATION: 97 % | TEMPERATURE: 98.1 F | HEART RATE: 63 BPM | BODY MASS INDEX: 41.75 KG/M2 | DIASTOLIC BLOOD PRESSURE: 57 MMHG | WEIGHT: 315 LBS | RESPIRATION RATE: 14 BRPM

## 2018-05-29 PROCEDURE — 74011250636 HC RX REV CODE- 250/636

## 2018-05-29 PROCEDURE — 76060000031 HC ANESTHESIA FIRST 0.5 HR: Performed by: INTERNAL MEDICINE

## 2018-05-29 PROCEDURE — 74011250636 HC RX REV CODE- 250/636: Performed by: INTERNAL MEDICINE

## 2018-05-29 PROCEDURE — 76040000019: Performed by: INTERNAL MEDICINE

## 2018-05-29 PROCEDURE — 74011000250 HC RX REV CODE- 250

## 2018-05-29 RX ORDER — NALOXONE HYDROCHLORIDE 0.4 MG/ML
0.4 INJECTION, SOLUTION INTRAMUSCULAR; INTRAVENOUS; SUBCUTANEOUS
Status: DISCONTINUED | OUTPATIENT
Start: 2018-05-29 | End: 2018-05-29 | Stop reason: HOSPADM

## 2018-05-29 RX ORDER — FLUMAZENIL 0.1 MG/ML
0.2 INJECTION INTRAVENOUS
Status: DISCONTINUED | OUTPATIENT
Start: 2018-05-29 | End: 2018-05-29 | Stop reason: HOSPADM

## 2018-05-29 RX ORDER — PROPOFOL 10 MG/ML
INJECTION, EMULSION INTRAVENOUS
Status: DISCONTINUED | OUTPATIENT
Start: 2018-05-29 | End: 2018-05-29 | Stop reason: HOSPADM

## 2018-05-29 RX ORDER — SODIUM CHLORIDE 9 MG/ML
50 INJECTION, SOLUTION INTRAVENOUS CONTINUOUS
Status: DISCONTINUED | OUTPATIENT
Start: 2018-05-29 | End: 2018-05-29 | Stop reason: HOSPADM

## 2018-05-29 RX ORDER — ATROPINE SULFATE 0.1 MG/ML
0.4 INJECTION INTRAVENOUS
Status: DISCONTINUED | OUTPATIENT
Start: 2018-05-29 | End: 2018-05-29 | Stop reason: HOSPADM

## 2018-05-29 RX ORDER — PROPOFOL 10 MG/ML
INJECTION, EMULSION INTRAVENOUS AS NEEDED
Status: DISCONTINUED | OUTPATIENT
Start: 2018-05-29 | End: 2018-05-29 | Stop reason: HOSPADM

## 2018-05-29 RX ORDER — EPINEPHRINE 0.1 MG/ML
1 INJECTION INTRACARDIAC; INTRAVENOUS
Status: DISCONTINUED | OUTPATIENT
Start: 2018-05-29 | End: 2018-05-29 | Stop reason: HOSPADM

## 2018-05-29 RX ORDER — LIDOCAINE HYDROCHLORIDE 20 MG/ML
INJECTION, SOLUTION EPIDURAL; INFILTRATION; INTRACAUDAL; PERINEURAL AS NEEDED
Status: DISCONTINUED | OUTPATIENT
Start: 2018-05-29 | End: 2018-05-29 | Stop reason: HOSPADM

## 2018-05-29 RX ORDER — MIDAZOLAM HYDROCHLORIDE 1 MG/ML
.25-5 INJECTION, SOLUTION INTRAMUSCULAR; INTRAVENOUS
Status: DISCONTINUED | OUTPATIENT
Start: 2018-05-29 | End: 2018-05-29 | Stop reason: HOSPADM

## 2018-05-29 RX ORDER — SODIUM CHLORIDE 9 MG/ML
INJECTION, SOLUTION INTRAVENOUS
Status: DISCONTINUED | OUTPATIENT
Start: 2018-05-29 | End: 2018-05-29 | Stop reason: HOSPADM

## 2018-05-29 RX ORDER — DEXTROMETHORPHAN/PSEUDOEPHED 2.5-7.5/.8
1.2 DROPS ORAL
Status: DISCONTINUED | OUTPATIENT
Start: 2018-05-29 | End: 2018-05-29 | Stop reason: HOSPADM

## 2018-05-29 RX ADMIN — LIDOCAINE HYDROCHLORIDE 20 MG: 20 INJECTION, SOLUTION EPIDURAL; INFILTRATION; INTRACAUDAL; PERINEURAL at 13:25

## 2018-05-29 RX ADMIN — PROPOFOL 140 MCG/KG/MIN: 10 INJECTION, EMULSION INTRAVENOUS at 13:25

## 2018-05-29 RX ADMIN — PROPOFOL 50 MG: 10 INJECTION, EMULSION INTRAVENOUS at 13:25

## 2018-05-29 RX ADMIN — SODIUM CHLORIDE: 9 INJECTION, SOLUTION INTRAVENOUS at 11:30

## 2018-05-29 RX ADMIN — PROPOFOL 40 MG: 10 INJECTION, EMULSION INTRAVENOUS at 13:28

## 2018-05-29 RX ADMIN — SODIUM CHLORIDE 50 ML/HR: 9 INJECTION, SOLUTION INTRAVENOUS at 12:06

## 2018-05-29 NOTE — IP AVS SNAPSHOT
[address] 
[phone] Patient: Lev Horne 
MRN: [id number] About your hospitalization You were admitted on:  May 29, 2018 You last received care in the:  [facility] ENDOSCOPY You were discharged on:  May 29, 2018 Why you were hospitalized Your primary diagnosis was:  Not on File Follow-up Information None Your Scheduled Appointments Tuesday July 24, 2018 10:30 AM EDT Office Visit with PABLITO Benitez [address] [place] [address] [address]  
[phone] Discharge Orders None A check maida indicates which time of day the medication should be taken. My Medications CONTINUE taking these medications Instructions Each Dose to Equal  
 Morning Noon Evening Bedtime  
 allopurinol 300 mg tablet Commonly known as:  Dagoberto Pfeiffer Your last dose was: Your next dose is: Take 1 Tab by mouth daily. 300 mg  
    
   
   
   
  
 atorvastatin 10 mg tablet Commonly known as:  LIPITOR Your last dose was: Your next dose is: Take 1 Tab by mouth every evening. 10 mg  
    
   
   
   
  
 calcium 500 mg Tab Your last dose was: Your next dose is: Take  by mouth. With potassium  
     
   
   
   
  
 cholecalciferol (vitamin D3) 2,000 unit Tab Your last dose was: Your next dose is: Take  by mouth. furosemide 20 mg tablet Commonly known as:  LASIX Your last dose was: Your next dose is: Take 1 Tab by mouth daily. 20 mg  
    
   
   
   
  
 indomethacin SR 75 mg SR capsule Commonly known as:  INDOCIN SR Your last dose was: Your next dose is: Take 75 mg by mouth daily as needed. 75 mg  
    
   
   
   
  
 ketoconazole 2 % topical cream  
Commonly known as:  NIZORAL Your last dose was: Your next dose is:    
   
   
 Apply  to affected area two (2) times a day. Use thin layer under each axilla for rash for 10-14 days. magnesium 250 mg Tab Your last dose was: Your next dose is: Take 400 mg by mouth daily. 400 mg Omega-3 Fatty Acids 300 mg Cap Your last dose was: Your next dose is: Take  by mouth.  
     
   
   
   
  
 triamcinolone acetonide 0.1 % topical cream  
Commonly known as:  KENALOG Your last dose was: Your next dose is:    
   
   
 Apply  to affected area two (2) times a day. use thin layer  
     
   
   
   
  
 valsartan 160 mg tablet Commonly known as:  DIOVAN Your last dose was: Your next dose is: Take 1 Tab by mouth daily. 160 mg  
    
   
   
   
  
 verapamil  mg CR tablet Commonly known as:  CALAN-SR Your last dose was: Your next dose is: TAKE 1 TABLET BY MOUTH TWICE A DAY Discharge Instructions [facility]. [facility] Andrey Aquino M.D. 
[phone] COLON DISCHARGE INSTRUCTIONS 
    
2018 [address] 
:  1950 [address] Medical Record Number:  [id number] COLONOSCOPY FINDINGS: 
Your colonoscopy showed mild sigmoid diverticulosis, otherwise mucosa within normal. No polyps seen. DISCOMFORT: 
Redness at IV site- apply warm compress to area; if redness or soreness persist- contact your physician There may be a slight amount of blood passed from the rectum Gaseous discomfort- walking, belching will help relieve any discomfort You may not operate a vehicle for 12 hours You may not engage in an occupation involving machinery or appliances for rest of today You may not drink alcoholic beverages for at least 12 hours Avoid making any critical decisions for at least 24 hour DIET: 
 High fiber diet.  however -  remember your colon is empty and a heavy meal will produce gas. Avoid these foods:  vegetables, fried / greasy foods, carbonated drinks for today ACTIVITY: 
You may resume your normal daily activities it is recommended that you spend the remainder of the day resting -  avoid any strenuous activity. CALL M.D. ANY SIGN OF: Increasing pain, nausea, vomiting Abdominal distension (swelling) New increased bleeding (oral or rectal) Fever (chills) Pain in chest area Bloody discharge from nose or mouth Shortness of breath Follow-up Instructions: 
 Call Dr. Koki Tabares if any questions or problems. Telephone # [phone] Should have a repeat colonoscopy in 5 years for polyp surveillance. Introducing \[facility] [place] [facility]\"" & [facility] SERVICES! Dear Leana Valdez: 
Thank you for requesting a VeriCenter account. Our records indicate that you already have an active VeriCenter account. You can access your account anytime at [email or web address] Did you know that you can access your hospital and ER discharge instructions at any time in VeriCenter? You can also review all of your test results from your hospital stay or ER visit. Additional Information If you have questions, please visit the Frequently Asked Questions section of the VeriCenter website at [email or web address] Remember, VeriCenter is NOT to be used for urgent needs. For medical emergencies, dial 911. Now available from your iPhone and Android! Introducing Brandan Arreola As a [employer] patient, I wanted to make you aware of our electronic visit tool called Brandan Arreola. [employer] 24/7 allows you to connect within minutes with a medical provider 24 hours a day, seven days a week via a mobile device or tablet or logging into a secure website from your computer. You can access [employer] from anywhere in the [place]. A virtual visit might be right for you when you have a simple condition and feel like you just dont want to get out of bed, or cant get away from work for an appointment, when your regular [facility] provider is not available (evenings, weekends or holidays), or when youre out of town and need minor care. Electronic visits cost only $49 and if the [facility]/[employer] provider determines a prescription is needed to treat your condition, one can be electronically transmitted to a nearby pharmacy*. Please take a moment to enroll today if you have not already done so. The enrollment process is free and takes just a few minutes. To enroll, please download the [employer] yuni to your tablet or phone, or visit [email or web address] to enroll on your computer. And, as an [address] [place] [address] patient with a [employer] account, the results of your visits will be scanned into your electronic medical record and your primary care provider will be able to view the scanned results. We urge you to continue to see your regular [facility] provider for your ongoing medical care. And while your primary care provider may not be the one available when you seek a Brandan Fisherfin virtual visit, the peace of mind you get from getting a real diagnosis real time can be priceless. For more information on [employer], view our Frequently Asked Questions (FAQs) at [email or web address]. Sincerely, 
 
Tom Ohara MD 
Chief Medical Officer [employer] *:  certain medications cannot be prescribed via [employer] Providers Seen During Your Hospitalization Provider Specialty Primary office phone Minerva Powell MD Gastroenterology [phone] Your Primary Care Physician (PCP) Primary Care Physician Office Phone Office Fax Essence Julio [phone] You are allergic to the following No active allergies Recent Documentation Height Weight BMI Smoking Status 1.854 m 154.7 kg 44.99 kg/m2 Never Smoker Emergency Contacts Name Discharge Info Relation Home Work Mobile Meri Thurston DISCHARGE CAREGIVER [3] Spouse [3] [phone] Patient Belongings The following personal items are in your possession at time of discharge: 
  Dental Appliances: Partials (lower partial plate left at home )  Visual Aid: Glasses Please provide this summary of care documentation to your next provider. Signatures-by signing, you are acknowledging that this After Visit Summary has been reviewed with you and you have received a copy. Patient Signature:  ____________________________________________________________ Date:  ____________________________________________________________  
  
Jude Hodges Provider Signature:  ____________________________________________________________ Date:  ____________________________________________________________

## 2018-05-29 NOTE — ROUTINE PROCESS
Lori Richardson  1950  [id number]    Situation:  Verbal report received from: Mana Gutiérrez RN  Procedure: Procedure(s):  COLONOSCOPY    Background:    Preoperative diagnosis: HX OF COLON POLYPS  Postoperative diagnosis: diverticulosis    :  Dr. Mahesh Murphy  Assistant(s): Endoscopy Technician-1: Vy García  Endoscopy RN-1: Mana Gutiérrez RN    Specimens: * No specimens in log *  H. Pylori  no    Assessment:  Intra-procedure medications   Anesthesia gave intra-procedure sedation and medications, see anesthesia flow sheet yes    Intravenous fluids: NS@ KVO     Vital signs stable     Abdominal assessment: round and soft     Recommendation:  Discharge patient per MD order.   Family or Friend   Permission to share finding with family or friend yes

## 2018-05-29 NOTE — PROCEDURES
Stacy Guy M.D.  [phone]            2018          Colonoscopy Operative Report  Kimo Hernandez  :  1950  [place] Medical Record Number:  [id number]      Indications:    Screening colonoscopy     :  Annamarie Martínez MD    Referring Provider: Ponce Browne MD    Sedation:  MAC anesthesia    Pre-Procedural Exam:      Airway: clear,  No airway problems anticipated  Heart: RRR, without gallops or rubs  Lungs: clear bilaterally without wheezes, crackles, or rhonchi  Abdomen: soft, nontender, nondistended, bowel sounds present  Mental Status: awake, alert and oriented to person, place and time     Procedure Details:  After informed consent was obtained with all risks and benefits of procedure explained and preoperative exam completed, the patient was taken to the endoscopy suite and placed in the left lateral decubitus position. Upon sequential sedation as per above, a digital rectal exam was performed. The Olympus videocolonoscope  was inserted in the rectum and carefully advanced to the cecum, which was identified by the ileocecal valve and appendiceal orifice. The quality of preparation was good. The colonoscope was slowly withdrawn with careful inspection and evaluation between folds. Retroflexion in the rectum was performed. Findings:   Terminal Ileum: not intubated  Cecum: 1  Sessile polyp(s), the largest 4 mm in size;  Ascending Colon: normal  Transverse Colon: normal  Descending Colon: 1  Sessile polyp(s), the largest 2 mm in size;  Sigmoid: normal  Rectum: no mucosal lesion appreciated  Grade 1 internal hemorrhoid(s);     Interventions:  2 complete polypectomy were performed using cold snare  and the polyps were  retrieved    Specimen Removed:  specimen #1, 4 mm in size, located in the cecum removed by cold snare and retrieved for pathology  #2, 2 mm in size, located in the descending colon removed by cold biopsy and sent for pathology    Complications: None. EBL:  None. Impression:  Two polyps removed and sent to pathology, otherwise mucosa within normal.                          Small internal hemorrhoids    Recommendations:  -If adenoma is present, repeat colonoscopy in 5 years.   -High fiber diet.    -Resume normal medication(s). Discharge Disposition:  Home in the company of a  when able to ambulate.     Lionel Coleman MD  5/29/2018  1:16 PM

## 2018-05-29 NOTE — PERIOP NOTES
Patient tolerated procedure without problems. Abdomen soft and patient arousable and voices no complaints Report received from CRNA, see anesthesia note. Patient transported to endoscopy recovery area. Endoscope was pre-cleaned at bedside immediately following procedure by Gretchen Landers

## 2018-05-29 NOTE — DISCHARGE INSTRUCTIONS
[address] [facility]. [place] M.D.  [phone]            COLON DISCHARGE INSTRUCTIONS       2018    Renny Thurston  :  1950  [facility] Medical Record Number:  [id number]      COLONOSCOPY FINDINGS:  Your colonoscopy showed mild sigmoid diverticulosis, otherwise mucosa within normal. No polyps seen. DISCOMFORT:  Redness at IV site- apply warm compress to area; if redness or soreness persist- contact your physician  There may be a slight amount of blood passed from the rectum  Gaseous discomfort- walking, belching will help relieve any discomfort  You may not operate a vehicle for 12 hours  You may not engage in an occupation involving machinery or appliances for rest of today  You may not drink alcoholic beverages for at least 12 hours  Avoid making any critical decisions for at least 24 hour  DIET:   High fiber diet. - however -  remember your colon is empty and a heavy meal will produce gas. Avoid these foods:  vegetables, fried / greasy foods, carbonated drinks for today     ACTIVITY:  You may resume your normal daily activities it is recommended that you spend the remainder of the day resting -  avoid any strenuous activity. CALL M.D. ANY SIGN OF:   Increasing pain, nausea, vomiting  Abdominal distension (swelling)  New increased bleeding (oral or rectal)  Fever (chills)  Pain in chest area  Bloody discharge from nose or mouth   Shortness of breath    Follow-up Instructions:   Call Dr. Joselyn Rider if any questions or problems. Telephone # [phone]    Should have a repeat colonoscopy in 5 years for polyp surveillance.

## 2018-05-29 NOTE — PROGRESS NOTES
Discharge instructions given to patient and wife, bedside  Dr Yefri Martino has been in and spoke to patient and wife, Abi Davis

## 2018-05-29 NOTE — H&P
Lesly Perez M.D.  [phone]            History and Physical       NAME:  Juan Ramon Graham   :   1950   MRN:   [id number]       Referring Physician:  Dr. Vincenzo Guerrero Date: 2018 12:56 PM    Chief Complaint:  Colon cancer screening    History of Present Illness:  Patient is a 74 y.o. who is seen for colon cancer screening and colon polyp surveillance. Denies any ongoing GI complaints. PMH:  Past Medical History:   Diagnosis Date    Cancer ([address]     melanoma     Gout     Hypercholesterolemia     Hypertension     Skin cancer     melanoma (left post shoulder) and BCC x 2 (scalp, right popliteal)    Sun-damaged skin     Thyroid disease        PSH:  Past Surgical History:   Procedure Laterality Date    HX HEENT Left     partial thyroidectomy    HX ORTHOPAEDIC      Left knee ACL     HX ORTHOPAEDIC      dupytrens contractures    HX THYROIDECTOMY  2013    HX TONSILLECTOMY  1950    SKIN TISSUE PROCEDURE UNLISTED         Allergies:  No Known Allergies    Home Medications:  Prior to Admission Medications   Prescriptions Last Dose Informant Patient Reported? Taking? Omega-3 Fatty Acids 300 mg cap 2018  Yes No   Sig: Take  by mouth. allopurinol (ZYLOPRIM) 300 mg tablet 2018 at Unknown time  No Yes   Sig: Take 1 Tab by mouth daily. atorvastatin (LIPITOR) 10 mg tablet 2018 at Unknown time  No Yes   Sig: Take 1 Tab by mouth every evening. calcium 500 mg tab 2018 at Unknown time  Yes Yes   Sig: Take  by mouth. With potassium   cholecalciferol, vitamin D3, 2,000 unit tab 2018  Yes No   Sig: Take  by mouth. furosemide (LASIX) 20 mg tablet 2018 at Unknown time  No Yes   Sig: Take 1 Tab by mouth daily. indomethacin SR (INDOCIN SR) 75 mg SR capsule Unknown at Unknown time  Yes No   Sig: Take 75 mg by mouth daily as needed.    ketoconazole (NIZORAL) 2 % topical cream 2018 at Unknown time  No Yes   Sig: Apply  to affected area two (2) times a day. Use thin layer under each axilla for rash for 10-14 days. magnesium 250 mg tab 5/26/2018  Yes No   Sig: Take 400 mg by mouth daily. triamcinolone acetonide (KENALOG) 0.1 % topical cream 5/22/2018 at Unknown time  No Yes   Sig: Apply  to affected area two (2) times a day. use thin layer   valsartan (DIOVAN) 160 mg tablet 5/29/2018 at Unknown time  No Yes   Sig: Take 1 Tab by mouth daily.    verapamil ER (CALAN-SR) 240 mg CR tablet 5/29/2018 at Unknown time  No Yes   Sig: TAKE 1 TABLET BY MOUTH TWICE A DAY      Facility-Administered Medications: None       Hospital Medications:  Current Facility-Administered Medications   Medication Dose Route Frequency    0.9% sodium chloride infusion  50 mL/hr IntraVENous CONTINUOUS    midazolam (VERSED) injection 0.25-5 mg  0.25-5 mg IntraVENous Multiple    naloxone (NARCAN) injection 0.4 mg  0.4 mg IntraVENous Multiple    flumazenil (ROMAZICON) 0.1 mg/mL injection 0.2 mg  0.2 mg IntraVENous Multiple    simethicone (MYLICON) 78SL/3.5BU oral drops 80 mg  1.2 mL Oral Multiple    atropine injection 0.4 mg  0.4 mg IntraVENous ONCE PRN    EPINEPHrine (ADRENALIN) 0.1 mg/mL syringe 1 mg  1 mg Endoscopically ONCE PRN     Facility-Administered Medications Ordered in Other Encounters   Medication Dose Route Frequency    0.9% sodium chloride infusion   IntraVENous CONTINUOUS       Social History:  Social History   Substance Use Topics    Smoking status: Never Smoker    Smokeless tobacco: Never Used    Alcohol use 0.6 oz/week     0 Glasses of wine, 1 Cans of beer per week       Family History:  Family History   Problem Relation Age of Onset    Diabetes Mother     Heart Disease Mother      heart valve replaced    Hypertension Father     Colon Polyps Father     Cancer Brother      Stomach              Review of Systems:      Constitutional: negative fever, negative chills, negative weight loss  Eyes:   negative visual changes  ENT:   negative sore throat, tongue or lip swelling  Respiratory:  negative cough, negative dyspnea  Cards:  negative for chest pain, palpitations, lower extremity edema  GI:   See HPI  :  negative for frequency, dysuria  Integument:  negative for rash and pruritus  Heme:  negative for easy bruising and gum/nose bleeding  Musculoskel: negative for myalgias,  back pain and muscle weakness  Neuro: negative for headaches, dizziness, vertigo  Psych:  negative for feelings of anxiety, depression       Objective:   Patient Vitals for the past 8 hrs:   BP Temp Pulse Resp SpO2 Height Weight   05/29/18 1206 - - - - 96 % - -   05/29/18 1151 135/64 98.5 °F (36.9 °C) 63 16 - 6' 1\" (1.854 m) 154.7 kg (341 lb)             EXAM:     NEURO-a&o   HEENT-wnl   LUNGS-clear    COR-regular rate and rhythym     ABD-soft , no tenderness, no rebound, good bs     EXT-no edema     Data Review     No results for input(s): WBC, HGB, HCT, PLT, HGBEXT, HCTEXT, PLTEXT in the last 72 hours. No results for input(s): NA, K, CL, CO2, BUN, CREA, GLU, PHOS, CA in the last 72 hours. No results for input(s): SGOT, GPT, AP, TBIL, TP, ALB, GLOB, GGT, AML, LPSE in the last 72 hours. No lab exists for component: AMYP, HLPSE  No results for input(s): INR, PTP, APTT in the last 72 hours. No lab exists for component: INREXT    Patient Active Problem List   Diagnosis Code    Hypertension I10    Hyperlipidemia E78.5    Gout M10.9    Multinodular goiter E04.2    Melanoma ([facility] [address] C43.9    Hx of adenomatous colonic polyps Z86.010    Obesity, morbid (HCC) E66.01      Assessment:   · Colon cancer screening and colon polyp surveillance   Plan:   · Colonoscopy today.      Signed By: Stephanie Guerrero MD     5/29/2018  12:56 PM

## 2018-05-29 NOTE — ANESTHESIA PREPROCEDURE EVALUATION
Anesthetic History   No history of anesthetic complications            Review of Systems / Medical History  Patient summary reviewed and pertinent labs reviewed    Pulmonary  Within defined limits                 Neuro/Psych   Within defined limits           Cardiovascular    Hypertension: well controlled          Hyperlipidemia    Exercise tolerance: >4 METS     GI/Hepatic/Renal  Within defined limits              Endo/Other        Morbid obesity, arthritis and cancer (h/o melanoma)     Other Findings   Comments: gout           Physical Exam    Airway  Mallampati: II  TM Distance: 4 - 6 cm  Neck ROM: normal range of motion   Mouth opening: Normal     Cardiovascular    Rhythm: regular  Rate: normal         Dental    Dentition: Full upper dentures and Full lower dentures  Comments: Permanent upper denture, removable lower denture   Pulmonary  Breath sounds clear to auscultation               Abdominal         Other Findings            Anesthetic Plan    ASA: 2  Anesthesia type: MAC          Induction: Intravenous  Anesthetic plan and risks discussed with: Patient

## 2018-06-02 NOTE — ANESTHESIA POSTPROCEDURE EVALUATION
Post-Anesthesia Evaluation and Assessment    Patient: Montana Pickard MRN: [id number]  SSN: [id number]    YOB: 1950  Age: 74 y.o. Sex: male       Cardiovascular Function/Vital Signs  Visit Vitals    /57    Pulse 63    Temp 36.7 °C (98.1 °F)    Resp 14    Ht 6' 1\" (1.854 m)    Wt 154.7 kg (341 lb)    SpO2 97%    BMI 44.99 kg/m2       Patient is status post MAC anesthesia for Procedure(s):  COLONOSCOPY. Nausea/Vomiting: None    Postoperative hydration reviewed and adequate. Pain:  Pain Scale 1: Numeric (0 - 10) (05/29/18 1402)  Pain Intensity 1: 0 (05/29/18 1402)   Managed    Neurological Status: At baseline    Mental Status and Level of Consciousness: Arousable    Pulmonary Status:   O2 Device: Room air (05/29/18 1402)   Adequate oxygenation and airway patent    Complications related to anesthesia: None    Post-anesthesia assessment completed.  No concerns    Signed By: Ravin Asencio MD     June 2, 2018

## 2018-07-10 ENCOUNTER — OFFICE VISIT (OUTPATIENT)
Dept: INTERNAL MEDICINE CLINIC | Age: 68
End: 2018-07-10

## 2018-07-10 ENCOUNTER — HOSPITAL ENCOUNTER (OUTPATIENT)
Dept: LAB | Age: 68
Discharge: HOME | End: 2018-07-10
Payer: MEDICARE

## 2018-07-10 VITALS
SYSTOLIC BLOOD PRESSURE: 124 MMHG | RESPIRATION RATE: 20 BRPM | BODY MASS INDEX: 41.75 KG/M2 | HEART RATE: 67 BPM | WEIGHT: 315 LBS | TEMPERATURE: 98.1 F | OXYGEN SATURATION: 98 % | DIASTOLIC BLOOD PRESSURE: 75 MMHG | HEIGHT: 73 IN

## 2018-07-10 DIAGNOSIS — M54.50 ACUTE LEFT-SIDED LOW BACK PAIN WITHOUT SCIATICA: ICD-10-CM

## 2018-07-10 DIAGNOSIS — R35.0 URINARY FREQUENCY: Primary | ICD-10-CM

## 2018-07-10 LAB
BILIRUB UR QL STRIP: NEGATIVE
GLUCOSE UR-MCNC: NEGATIVE MG/DL
KETONES P FAST UR STRIP-MCNC: NEGATIVE MG/DL
PH UR STRIP: 5.5 [PH] (ref 4.6–8)
PROT UR QL STRIP: NEGATIVE
SP GR UR STRIP: 1.02 (ref 1–1.03)
UA UROBILINOGEN AMB POC: NORMAL (ref 0.2–1)
URINALYSIS CLARITY POC: CLEAR
URINALYSIS COLOR POC: NORMAL
URINE BLOOD POC: NEGATIVE
URINE LEUKOCYTES POC: NEGATIVE
URINE NITRITES POC: NEGATIVE

## 2018-07-10 PROCEDURE — 87086 URINE CULTURE/COLONY COUNT: CPT

## 2018-07-10 NOTE — PROGRESS NOTES
HISTORY OF PRESENT ILLNESS  Nader Palacio is a 74 y.o. male. HPI  Presents with complaints of urinary frequency for the past 2 nights. He usually has to get up and void 2-3 times per night but last night felt like he had to go every 15-30 minutes and was passing 4-6 oz at a time. Denies burning sensation, sense of urgency, scrotal tenderness. Noticed some left lower back discomfort around the same time as urinary symptoms and was worried about possible kidney infection. Reports left lower back pain has decreased since this am. Denies actual flank pain, fever, chills, nausea, vomiting. Denies rectal pain or pressure and denies pain with defecation. Has not noticed any blood in urine or stool. Denies any decrease in force of urinary stream or sense of incomplete emptying. He takes Lasix 20 mg on a daily basis. Review of Systems   Constitutional: Negative for chills, fever and malaise/fatigue. HENT: Negative for congestion. Respiratory: Negative for cough and shortness of breath. Cardiovascular: Negative for chest pain and palpitations. Gastrointestinal: Negative for abdominal pain, blood in stool, constipation, diarrhea, nausea and vomiting. Genitourinary: Positive for frequency. Negative for dysuria, flank pain, hematuria and urgency. Musculoskeletal: Positive for back pain. Negative for joint pain and myalgias. Skin: Negative for rash. Neurological: Negative for dizziness and headaches. Physical Exam   Constitutional: He is oriented to person, place, and time. He appears well-developed and well-nourished. HENT:   Head: Normocephalic and atraumatic. Neck: Normal range of motion. Neck supple. No thyromegaly present. Cardiovascular: Normal rate and regular rhythm. Pulmonary/Chest: Effort normal and breath sounds normal.   Abdominal: Soft. Bowel sounds are normal. There is no tenderness. There is no rebound and no guarding.    Musculoskeletal:        Lumbar back: He exhibits tenderness (minimal). He exhibits no pain and no spasm. Back:    Neurological: He is alert and oriented to person, place, and time. Skin: Skin is warm and dry. Psychiatric: He has a normal mood and affect. His behavior is normal.   Nursing note and vitals reviewed. Results for orders placed or performed in visit on 07/10/18   AMB POC URINALYSIS DIP STICK AUTO W/O MICRO     Status: Normal   Result Value Ref Range Status    Color (UA POC) Dark Yellow  Final    Clarity (UA POC) Clear  Final    Glucose (UA POC) Negative Negative Final    Bilirubin (UA POC) Negative Negative Final    Ketones (UA POC) Negative Negative Final    Specific gravity (UA POC) 1.025 1.001 - 1.035 Final    Blood (UA POC) Negative Negative Final    pH (UA POC) 5.5 4.6 - 8.0 Final    Protein (UA POC) Negative Negative Final    Urobilinogen (UA POC) 0.2 mg/dL 0.2 - 1 Final    Nitrites (UA POC) Negative Negative Final    Leukocyte esterase (UA POC) Negative Negative Final     ASSESSMENT and PLAN  Diagnoses and all orders for this visit:    1. Urinary frequency -- urine dip in office normal without signs of blood; will send out urine culture to ensure no urinary infection; unlikely prostatitis with no clinical symptoms.  -     AMB POC URINALYSIS DIP STICK AUTO W/O MICRO  -     CULTURE, URINE    2.  Acute left-sided low back pain without sciatica -- most likely MSK in nature  -     AMB POC URINALYSIS DIP STICK AUTO W/O MICRO      lab results and schedule of future lab studies reviewed with patient  reviewed diet, exercise and weight control  reviewed medications and side effects in detail

## 2018-07-10 NOTE — MR AVS SNAPSHOT
[address] [facility] 
 
 
 [address] [facility] [address] [facility] 
[phone] Patient: Mitra Balbuena 
MRN: [id number] Visit Information Date & Time Provider Department Dept. Phone Encounter #  
 7/10/2018  1:20 PM Katheryne Severe, NP [facility] [address] [facility] [id number] Your Appointments 7/24/2018 10:30 AM  
Office Visit with PABLITO Montalvo [address] [facility]) Appt Note: est. pt. 3 month skin exam  
 [facility] [address]  
[facility] [place] [address] [facility] [address]  
  
    
 9/21/2018  1:15 PM  
ROUTINE CARE with Maria G Velez MD  
[facility]) Appt Note: 6 month f/u tr 3/13/18  
 [address] [address]  
[phone]  
  
   
 [address] [facility] [address] Upcoming Health Maintenance Date Due  
 GLAUCOMA SCREENING Q2Y 10/23/2017 Pneumococcal 65+ High/Highest Risk (1 of 2 - PCV13) 10/23/2017 Influenza Age 5 to Adult 8/1/2018 MEDICARE YEARLY EXAM 3/14/2019 COLONOSCOPY 5/29/2023 DTaP/Tdap/Td series (2 - Td) 11/24/2024 Allergies as of 7/10/2018  Review Complete On: 7/10/2018 By: Katheryne Severe, NP No Known Allergies Current Immunizations  Reviewed on 3/13/2018 Name Date Influenza High Dose Vaccine PF 11/15/2017, 10/5/2016 Influenza Vaccine 11/24/2015, 10/1/2015 Tdap 11/24/2014 Zoster Vaccine, Live 1/21/2016 Not reviewed this visit You Were Diagnosed With   
  
 Codes Comments Urinary frequency    -  Primary ICD-10-CM: R35.0 ICD-9-CM: 788.41 Acute left-sided low back pain without sciatica     ICD-10-CM: M54.5 ICD-9-CM: 724.2 Vitals BP Pulse Temp Resp Height(growth percentile) Weight(growth percentile) 124/75 (BP 1 Location: Left arm, BP Patient Position: Sitting) 67 98.1 °F (36.7 °C) (Oral) 20 6' 1\" (1.854 m) 349 lb (158.3 kg) SpO2 BMI Smoking Status 98% 46.04 kg/m2 Never Smoker Vitals History BMI and BSA Data Body Mass Index Body Surface Area 46.04 kg/m 2 2.86 m 2 Preferred Pharmacy Pharmacy Name Phone [id number] [address] [phone] Your Updated Medication List  
  
   
This list is accurate as of 7/10/18  1:53 PM.  Always use your most recent med list.  
  
  
  
  
 allopurinol 300 mg tablet Commonly known as:  Julio Rist Take 1 Tab by mouth daily. atorvastatin 10 mg tablet Commonly known as:  LIPITOR Take 1 Tab by mouth every evening. calcium 500 mg Tab Take  by mouth. With potassium  
  
 cholecalciferol (vitamin D3) 2,000 unit Tab Take  by mouth. furosemide 20 mg tablet Commonly known as:  LASIX Take 1 Tab by mouth daily. indomethacin SR 75 mg SR capsule Commonly known as:  INDOCIN SR Take 75 mg by mouth daily as needed. ketoconazole 2 % topical cream  
Commonly known as:  NIZORAL Apply  to affected area two (2) times a day. Use thin layer under each axilla for rash for 10-14 days. magnesium 250 mg Tab Take 400 mg by mouth daily. Omega-3 Fatty Acids 300 mg Cap Take  by mouth.  
  
 triamcinolone acetonide 0.1 % topical cream  
Commonly known as:  KENALOG Apply  to affected area two (2) times a day. use thin layer  
  
 valsartan 160 mg tablet Commonly known as:  DIOVAN Take 1 Tab by mouth daily. verapamil  mg CR tablet Commonly known as:  CALAN-SR  
TAKE 1 TABLET BY MOUTH TWICE A DAY We Performed the Following AMB POC URINALYSIS DIP STICK AUTO W/O MICRO [36320 CPT(R)] CULTURE, URINE [id number] CPT(R)] Patient Instructions Frequent Urination: Care Instructions Your Care Instructions An urge to urinate frequently but usually passing only small amounts of urine is a common symptom of urinary problems, such as urinary tract infections. The bladder may become inflamed. This can cause the urge to urinate. You may try to urinate more often than usual to try to soothe that urge. Frequent urination also may be caused by sexually transmitted infections (STIs) or kidney stones. Or it may happen when something irritates the tube that carries urine from the bladder to the outside of the body (urethra). It may also be a sign of diabetes. The cause may be hard to find. You may need tests. Follow-up care is a key part of your treatment and safety. Be sure to make and go to all appointments, and call your doctor if you are having problems. It's also a good idea to know your test results and keep a list of the medicines you take. How can you care for yourself at home? · Drink extra water for the next day or two. This will help make the urine less concentrated. (If you have kidney, heart, or liver disease and have to limit fluids, talk with your doctor before you increase the amount of fluids you drink.) · Avoid drinks that are carbonated or have caffeine. They can irritate the bladder. For women: · Urinate right after you have sex. · After you go to the bathroom, wipe from front to back. · Avoid douches, bubble baths, and feminine hygiene sprays. And avoid other feminine hygiene products that have deodorants. When should you call for help? Call your doctor now or seek immediate medical care if: 
  · You have new symptoms, such as fever, nausea, or vomiting.  
  · You have new or worse symptoms of a urinary problem. For example: ¨ You have blood or pus in your urine. ¨ You have chills or body aches. ¨ It hurts to urinate. ¨ You have groin or belly pain.  
¨ You have pain in your back just below your rib cage (the flank area).  
 Watch closely for changes in your health, and be sure to contact your doctor if you feel thirstier than usual. 
Where can you learn more? Go to http://juliana-samy.info/. Enter 588 8410 in the search box to learn more about \"Frequent Urination: Care Instructions. \" Current as of: May 12, 2017 Content Version: 11.7 © 5469-6137 Activ[employer]. Care instructions adapted under license by [employer] (which disclaims liability or warranty for this information). If you have questions about a medical condition or this instruction, always ask your healthcare professional. [address] any warranty or liability for your use of this information. Learning About Low Back Pain What is low back pain? Low back pain is pain that can occur anywhere below the ribs and above the legs. It is very common. Almost everyone has it at one time or another. Low back pain can be: 
Acute. This is new pain that can last a few days to a few weeks-at the most a few months. Chronic. This pain can last for more than a few months. Sometimes it can last for years. What are some myths about low back pain? Here are some common myths about low back pain-and the facts: Myth: \"I need to rest my back when I have back pain. \" 
Fact: Staying active won't hurt you. It may help you get better faster. Myth: \"I need prescription pain medicine. \" 
Fact: It's best to try to let time and being active heal your back. Opioid pain medicines-such as hydrocodone or oxycodone-usually don't work any better than over-the-counter medicines like ibuprofen or naproxen. And opioids can cause serious problems like addiction or overdose. Myth: \"I need a test like an X-ray or an MRI to diagnose my low back pain. \" 
Fact: Getting a test right away won't help you get better faster. And it could lead you down a treatment path you may not need, since most people get better on their own. What causes low back pain? In most cases, there isn't a clear cause. This can be frustrating, because your back hurts and there's no obvious reason. Your back pain can be caused by: 
Overuse or muscle strain. This can happen from playing sports, lifting heavy things, or not being physically fit. A herniated disc. This is a problem with the cushion between the bones in your back. Arthritis. With age, you may have changes in your bones that can narrow the space around your nerves. Other causes. In rare cases, the cause is a serious illness like an infection or cancer. But there are usually other symptoms too. What are the symptoms? Your symptoms depend on your body and the cause of your back pain. You may feel: 
· Pain that's sharp or dull. It may be in one small area or over a broad area. But even bad pain doesn't mean that it's caused by something serious. · Leg pain, numbness, or tingling. When a nerve gets squeezed-such as from a disc problem or arthritis-you may have symptoms in your leg or foot. You can even have leg symptoms from a back problem without having any pain in your back. How is low back pain diagnosed? A physical exam is the main way to diagnose low back pain. Your doctor may examine your back, check your nerves by testing your reflexes, and make sure that your muscles are strong. He or she also will ask questions about your back and overall health. Most people don't need any tests right away. Tests often don't show the reason for your pain. If your pain lasts more than 6 weeks or you have symptoms that your doctor is more concerned about, he or she may order tests. These may include an X-ray, a CT scan, or an MRI. In some cases, tests can help your doctor find a cause for your pain, especially for pain in one or both legs. How is low back pain treated? Most acute low back pain gets better on its own within a few weeks, no matter what the cause.  Time and doing usual activities are all that most people need to feel better. Using heat or ice and taking over-the-counter pain medicine also can help while your body heals. If you aren't getting better on your own or your pain is very bad, your doctor may recommend: 
· Physical therapy. · Spinal manipulation, such as by a chiropractor. · Acupuncture. · Massage. · Injections of steroid medicine in your back (especially for pain that involves your legs). If you have chronic low back pain, treatment will help you understand and manage your pain. Treatment may include: 
· Staying active. This may include walking or doing back exercises. · Physical therapy. · Medicines. Some of these medicines are also used for other problems, like depression. · Pain management. Your doctor may have you see a pain specialist. 
· Counseling. Having chronic pain can be hard. It may help to talk to someone who can help you cope with your pain. Surgery isn't needed for most people. But it may help some types of low back pain. Follow-up care is a key part of your treatment and safety. Be sure to make and go to all appointments, and call your doctor if you are having problems. It's also a good idea to know your test results and keep a list of the medicines you take. When should you call for help? Call 911 anytime you think you may need emergency care. For example, call if: 
· You can't move a leg at all. Call your doctor now or seek immediate medical care if: 
· You have new or worse symptoms in your legs, belly, or buttocks. Symptoms may include: ¨ Numbness or tingling. ¨ Weakness. ¨ Pain. · You lose bladder or bowel control. Watch closely for changes in your health, and be sure to contact your doctor if: · Along with the back pain, you have a fever, lose weight, or don't feel well. · You do not get better as expected. Where can you learn more? Go to http://juliana-samy.info/. Enter A007 in the search box to learn more about \"Learning About Low Back Pain. \" Current as of: November 29, 2017 Content Version: 11.7 © 0336-7196 [employer] Predictive Technologies. Care instructions adapted under license by Seedrs (which disclaims liability or warranty for this information). If you have questions about a medical condition or this instruction, always ask your healthcare professional. [address] any warranty or liability for your use of this information. Introducing [address]! Dear Maria G Reddy: 
Thank you for requesting a CarJump account. Our records indicate that you already have an active CarJump account. You can access your account anytime at [email or web address] Did you know that you can access your hospital and ER discharge instructions at any time in CarJump? You can also review all of your test results from your hospital stay or ER visit. Additional Information If you have questions, please visit the Frequently Asked Questions section of the CarJump website at [email or web address] Remember, CarJump is NOT to be used for urgent needs. For medical emergencies, dial 911. Now available from your iPhone and Android! Please provide this summary of care documentation to your next provider. Your primary care clinician is listed as Demian Mejia. If you have any questions after today's visit, please call [phone].

## 2018-07-10 NOTE — PATIENT INSTRUCTIONS
Frequent Urination: Care Instructions  Your Care Instructions  An urge to urinate frequently but usually passing only small amounts of urine is a common symptom of urinary problems, such as urinary tract infections. The bladder may become inflamed. This can cause the urge to urinate. You may try to urinate more often than usual to try to soothe that urge. Frequent urination also may be caused by sexually transmitted infections (STIs) or kidney stones. Or it may happen when something irritates the tube that carries urine from the bladder to the outside of the body (urethra). It may also be a sign of diabetes. The cause may be hard to find. You may need tests. Follow-up care is a key part of your treatment and safety. Be sure to make and go to all appointments, and call your doctor if you are having problems. It's also a good idea to know your test results and keep a list of the medicines you take. How can you care for yourself at home? · Drink extra water for the next day or two. This will help make the urine less concentrated. (If you have kidney, heart, or liver disease and have to limit fluids, talk with your doctor before you increase the amount of fluids you drink.)  · Avoid drinks that are carbonated or have caffeine. They can irritate the bladder. For women:  · Urinate right after you have sex. · After you go to the bathroom, wipe from front to back. · Avoid douches, bubble baths, and feminine hygiene sprays. And avoid other feminine hygiene products that have deodorants. When should you call for help? Call your doctor now or seek immediate medical care if:    · You have new symptoms, such as fever, nausea, or vomiting.     · You have new or worse symptoms of a urinary problem. For example:  ¨ You have blood or pus in your urine. ¨ You have chills or body aches. ¨ It hurts to urinate. ¨ You have groin or belly pain. ¨ You have pain in your back just below your rib cage (the flank area).  Watch closely for changes in your health, and be sure to contact your doctor if you feel thirstier than usual.  Where can you learn more? Go to http://juliana-samy.info/. Enter 855 2303 in the search box to learn more about \"Frequent Urination: Care Instructions. \"  Current as of: May 12, 2017  Content Version: 11.7  © 0636-6244 [employer] Games. Care instructions adapted under license by [employer] (which disclaims liability or warranty for this information). If you have questions about a medical condition or this instruction, always ask your healthcare professional. [place] [address] any warranty or liability for your use of this information. Learning About Low Back Pain  What is low back pain? Low back pain is pain that can occur anywhere below the ribs and above the legs. It is very common. Almost everyone has it at one time or another. Low back pain can be:  Acute. This is new pain that can last a few days to a few weeks-at the most a few months. Chronic. This pain can last for more than a few months. Sometimes it can last for years. What are some myths about low back pain? Here are some common myths about low back pain-and the facts:  Myth: \"I need to rest my back when I have back pain. \"  Fact: Staying active won't hurt you. It may help you get better faster. Myth: \"I need prescription pain medicine. \"  Fact: It's best to try to let time and being active heal your back. Opioid pain medicines-such as hydrocodone or oxycodone-usually don't work any better than over-the-counter medicines like ibuprofen or naproxen. And opioids can cause serious problems like addiction or overdose. Myth: \"I need a test like an X-ray or an MRI to diagnose my low back pain. \"  Fact: Getting a test right away won't help you get better faster. And it could lead you down a treatment path you may not need, since most people get better on their own.   What causes low back pain? In most cases, there isn't a clear cause. This can be frustrating, because your back hurts and there's no obvious reason. Your back pain can be caused by:  Overuse or muscle strain. This can happen from playing sports, lifting heavy things, or not being physically fit. A herniated disc. This is a problem with the cushion between the bones in your back. Arthritis. With age, you may have changes in your bones that can narrow the space around your nerves. Other causes. In rare cases, the cause is a serious illness like an infection or cancer. But there are usually other symptoms too. What are the symptoms? Your symptoms depend on your body and the cause of your back pain. You may feel:  · Pain that's sharp or dull. It may be in one small area or over a broad area. But even bad pain doesn't mean that it's caused by something serious. · Leg pain, numbness, or tingling. When a nerve gets squeezed-such as from a disc problem or arthritis-you may have symptoms in your leg or foot. You can even have leg symptoms from a back problem without having any pain in your back. How is low back pain diagnosed? A physical exam is the main way to diagnose low back pain. Your doctor may examine your back, check your nerves by testing your reflexes, and make sure that your muscles are strong. He or she also will ask questions about your back and overall health. Most people don't need any tests right away. Tests often don't show the reason for your pain. If your pain lasts more than 6 weeks or you have symptoms that your doctor is more concerned about, he or she may order tests. These may include an X-ray, a CT scan, or an MRI. In some cases, tests can help your doctor find a cause for your pain, especially for pain in one or both legs. How is low back pain treated? Most acute low back pain gets better on its own within a few weeks, no matter what the cause.  Time and doing usual activities are all that most people need to feel better. Using heat or ice and taking over-the-counter pain medicine also can help while your body heals. If you aren't getting better on your own or your pain is very bad, your doctor may recommend:  · Physical therapy. · Spinal manipulation, such as by a chiropractor. · Acupuncture. · Massage. · Injections of steroid medicine in your back (especially for pain that involves your legs). If you have chronic low back pain, treatment will help you understand and manage your pain. Treatment may include:  · Staying active. This may include walking or doing back exercises. · Physical therapy. · Medicines. Some of these medicines are also used for other problems, like depression. · Pain management. Your doctor may have you see a pain specialist.  · Counseling. Having chronic pain can be hard. It may help to talk to someone who can help you cope with your pain. Surgery isn't needed for most people. But it may help some types of low back pain. Follow-up care is a key part of your treatment and safety. Be sure to make and go to all appointments, and call your doctor if you are having problems. It's also a good idea to know your test results and keep a list of the medicines you take. When should you call for help? Call 911 anytime you think you may need emergency care. For example, call if:  · You can't move a leg at all. Call your doctor now or seek immediate medical care if:  · You have new or worse symptoms in your legs, belly, or buttocks. Symptoms may include:  ¨ Numbness or tingling. ¨ Weakness. ¨ Pain. · You lose bladder or bowel control. Watch closely for changes in your health, and be sure to contact your doctor if:  · Along with the back pain, you have a fever, lose weight, or don't feel well. · You do not get better as expected. Where can you learn more? Go to http://juliana-samy.info/.   Enter A007 in the search box to learn more about \"Learning About Low Back Pain. \"  Current as of: November 29, 2017  Content Version: 11.7  © 3715-3436 [employer], [employer]. Care instructions adapted under license by Pax[employer] (which disclaims liability or warranty for this information). If you have questions about a medical condition or this instruction, always ask your healthcare professional. [place] [address] any warranty or liability for your use of this information.

## 2018-07-11 LAB — BACTERIA UR CULT: NO GROWTH

## 2018-07-18 ENCOUNTER — APPOINTMENT (OUTPATIENT)
Dept: CT IMAGING | Age: 68
End: 2018-07-18
Attending: EMERGENCY MEDICINE
Payer: MEDICARE

## 2018-07-18 ENCOUNTER — TELEPHONE (OUTPATIENT)
Dept: INTERNAL MEDICINE CLINIC | Age: 68
End: 2018-07-18

## 2018-07-18 ENCOUNTER — HOSPITAL ENCOUNTER (EMERGENCY)
Age: 68
Discharge: HOME | End: 2018-07-18
Attending: EMERGENCY MEDICINE
Payer: MEDICARE

## 2018-07-18 VITALS
WEIGHT: 315 LBS | HEART RATE: 71 BPM | TEMPERATURE: 98.2 F | OXYGEN SATURATION: 96 % | SYSTOLIC BLOOD PRESSURE: 131 MMHG | DIASTOLIC BLOOD PRESSURE: 69 MMHG | RESPIRATION RATE: 16 BRPM | BODY MASS INDEX: 41.75 KG/M2 | HEIGHT: 73 IN

## 2018-07-18 DIAGNOSIS — M54.42 LEFT-SIDED LOW BACK PAIN WITH LEFT-SIDED SCIATICA, UNSPECIFIED CHRONICITY: ICD-10-CM

## 2018-07-18 DIAGNOSIS — R10.9 LEFT FLANK PAIN: Primary | ICD-10-CM

## 2018-07-18 LAB
ALBUMIN SERPL-MCNC: 3.8 G/DL (ref 3.5–5)
ALBUMIN/GLOB SERPL: 0.9 {RATIO} (ref 1.1–2.2)
ALP SERPL-CCNC: 84 U/L (ref 45–117)
ALT SERPL-CCNC: 35 U/L (ref 12–78)
ANION GAP SERPL CALC-SCNC: 8 MMOL/L (ref 5–15)
APPEARANCE UR: CLEAR
AST SERPL-CCNC: 32 U/L (ref 15–37)
BACTERIA URNS QL MICRO: ABNORMAL /HPF
BASOPHILS # BLD: 0 K/UL (ref 0–0.1)
BASOPHILS NFR BLD: 0 % (ref 0–1)
BILIRUB SERPL-MCNC: 0.6 MG/DL (ref 0.2–1)
BILIRUB UR QL: NEGATIVE
BUN SERPL-MCNC: 14 MG/DL (ref 6–20)
BUN/CREAT SERPL: 12 (ref 12–20)
CALCIUM SERPL-MCNC: 8.7 MG/DL (ref 8.5–10.1)
CHLORIDE SERPL-SCNC: 103 MMOL/L (ref 97–108)
CO2 SERPL-SCNC: 28 MMOL/L (ref 21–32)
COLOR UR: ABNORMAL
CREAT SERPL-MCNC: 1.14 MG/DL (ref 0.7–1.3)
DIFFERENTIAL METHOD BLD: NORMAL
EOSINOPHIL # BLD: 0.3 K/UL (ref 0–0.4)
EOSINOPHIL NFR BLD: 3 % (ref 0–7)
EPITH CASTS URNS QL MICRO: ABNORMAL /LPF
ERYTHROCYTE [DISTWIDTH] IN BLOOD BY AUTOMATED COUNT: 13.2 % (ref 11.5–14.5)
GLOBULIN SER CALC-MCNC: 4.2 G/DL (ref 2–4)
GLUCOSE SERPL-MCNC: 114 MG/DL (ref 65–100)
GLUCOSE UR STRIP.AUTO-MCNC: NEGATIVE MG/DL
HCT VFR BLD AUTO: 43 % (ref 36.6–50.3)
HGB BLD-MCNC: 14.8 G/DL (ref 12.1–17)
HGB UR QL STRIP: NEGATIVE
HYALINE CASTS URNS QL MICRO: ABNORMAL /LPF (ref 0–5)
KETONES UR QL STRIP.AUTO: NEGATIVE MG/DL
LEUKOCYTE ESTERASE UR QL STRIP.AUTO: NEGATIVE
LYMPHOCYTES # BLD: 2 K/UL
LYMPHOCYTES NFR BLD: 24 % (ref 12–49)
MCH RBC QN AUTO: 32.9 PG (ref 26–34)
MCHC RBC AUTO-ENTMCNC: 34.4 G/DL (ref 30–36.5)
MCV RBC AUTO: 95.6 FL (ref 80–99)
MONOCYTES # BLD: 0.6 K/UL (ref 0–1)
MONOCYTES NFR BLD: 7 % (ref 5–13)
MUCOUS THREADS URNS QL MICRO: ABNORMAL /LPF
NEUTS SEG # BLD: 5.6 K/UL (ref 1.8–8)
NEUTS SEG NFR BLD: 66 % (ref 32–75)
NITRITE UR QL STRIP.AUTO: NEGATIVE
PH UR STRIP: 6 [PH] (ref 5–8)
PLATELET # BLD AUTO: 266 K/UL (ref 150–400)
PMV BLD AUTO: 9.8 FL (ref 8.9–12.9)
POTASSIUM SERPL-SCNC: 4.3 MMOL/L (ref 3.5–5.1)
PROT SERPL-MCNC: 8 G/DL (ref 6.4–8.2)
PROT UR STRIP-MCNC: NEGATIVE MG/DL
RBC # BLD AUTO: 4.5 M/UL (ref 4.1–5.7)
RBC #/AREA URNS HPF: ABNORMAL /HPF (ref 0–5)
RBC MORPH BLD: NORMAL
SODIUM SERPL-SCNC: 139 MMOL/L (ref 136–145)
SP GR UR REFRACTOMETRY: 1.01 (ref 1–1.03)
UR CULT HOLD, URHOLD: NORMAL
UROBILINOGEN UR QL STRIP.AUTO: 0.2 EU/DL (ref 0.2–1)
WBC # BLD AUTO: 8.5 K/UL (ref 4.1–11.1)
WBC URNS QL MICRO: ABNORMAL /HPF (ref 0–4)
XXWBCSUS: 1

## 2018-07-18 PROCEDURE — 74011636320 HC RX REV CODE- 636/320: Performed by: EMERGENCY MEDICINE

## 2018-07-18 PROCEDURE — 85025 COMPLETE CBC W/AUTO DIFF WBC: CPT | Performed by: EMERGENCY MEDICINE

## 2018-07-18 PROCEDURE — 81001 URINALYSIS AUTO W/SCOPE: CPT | Performed by: EMERGENCY MEDICINE

## 2018-07-18 PROCEDURE — 36415 COLL VENOUS BLD VENIPUNCTURE: CPT | Performed by: EMERGENCY MEDICINE

## 2018-07-18 PROCEDURE — 74177 CT ABD & PELVIS W/CONTRAST: CPT

## 2018-07-18 PROCEDURE — 80053 COMPREHEN METABOLIC PANEL: CPT | Performed by: EMERGENCY MEDICINE

## 2018-07-18 PROCEDURE — 99283 EMERGENCY DEPT VISIT LOW MDM: CPT

## 2018-07-18 RX ORDER — METHOCARBAMOL 750 MG/1
750 TABLET, FILM COATED ORAL 4 TIMES DAILY
Qty: 30 TAB | Refills: 0 | Status: SHIPPED | OUTPATIENT
Start: 2018-07-18 | End: 2018-08-27 | Stop reason: ALTCHOICE

## 2018-07-18 RX ORDER — HYDROCODONE BITARTRATE AND ACETAMINOPHEN 7.5; 325 MG/1; MG/1
1 TABLET ORAL
Qty: 20 TAB | Refills: 0 | Status: SHIPPED | OUTPATIENT
Start: 2018-07-18 | End: 2018-08-27 | Stop reason: ALTCHOICE

## 2018-07-18 RX ORDER — PREDNISONE 10 MG/1
TABLET ORAL
Qty: 48 TAB | Refills: 0 | Status: SHIPPED | OUTPATIENT
Start: 2018-07-18 | End: 2018-08-27 | Stop reason: ALTCHOICE

## 2018-07-18 RX ADMIN — IOPAMIDOL 100 ML: 755 INJECTION, SOLUTION INTRAVENOUS at 10:01

## 2018-07-18 NOTE — TELEPHONE ENCOUNTER
----- Message from Montse Lowry sent at 7/18/2018  7:40 AM EDT -----  Regarding: PABLITO Ramos/Telephone   Pt would like like to rely message to Dr. Namita Shepard is still experiencing pain urinary tract and will to go [phone].

## 2018-07-18 NOTE — DISCHARGE INSTRUCTIONS
Learning About Relief for Back Pain  What is back tension and strain? Back strain happens when you overstretch, or pull, a muscle in your back. You may hurt your back in an accident or when you exercise or lift something. Most back pain will get better with rest and time. You can take care of yourself at home to help your back heal.  What can you do first to relieve back pain? When you first feel back pain, try these steps:  · Walk. Take a short walk (10 to 20 minutes) on a level surface (no slopes, hills, or stairs) every 2 to 3 hours. Walk only distances you can manage without pain, especially leg pain. · Relax. Find a comfortable position for rest. Some people are comfortable on the floor or a medium-firm bed with a small pillow under their head and another under their knees. Some people prefer to lie on their side with a pillow between their knees. Don't stay in one position for too long. · Try heat or ice. Try using a heating pad on a low or medium setting, or take a warm shower, for 15 to 20 minutes every 2 to 3 hours. Or you can buy single-use heat wraps that last up to 8 hours. You can also try an ice pack for 10 to 15 minutes every 2 to 3 hours. You can use an ice pack or a bag of frozen vegetables wrapped in a thin towel. There is not strong evidence that either heat or ice will help, but you can try them to see if they help. You may also want to try switching between heat and cold. · Take pain medicine exactly as directed. ¨ If the doctor gave you a prescription medicine for pain, take it as prescribed. ¨ If you are not taking a prescription pain medicine, ask your doctor if you can take an over-the-counter medicine. What else can you do? · Stretch and exercise. Exercises that increase flexibility may relieve your pain and make it easier for your muscles to keep your spine in a good, neutral position. And don't forget to keep walking. · Do self-massage.  You can use self-massage to unwind after work or school or to energize yourself in the morning. You can easily massage your feet, hands, or neck. Self-massage works best if you are in comfortable clothes and are sitting or lying in a comfortable position. Use oil or lotion to massage bare skin. · Reduce stress. Back pain can lead to a vicious [place]: Distress about the pain tenses the muscles in your back, which in turn causes more pain. Learn how to relax your mind and your muscles to lower your stress. Where can you learn more? Go to http://juliana-samy.info/. Enter P249 in the search box to learn more about \"Learning About Relief for Back Pain. \"  Current as of: March 21, 2017  Content Version: 11.5  © 0821-9273 Healthwise, [employer]. Care instructions adapted under license by [employer] (which disclaims liability or warranty for this information). If you have questions about a medical condition or this instruction, always ask your healthcare professional. [place] [address] any warranty or liability for your use of this information.

## 2018-07-18 NOTE — ED NOTES
Patient has returned from CT in no distress. Seated on side of bed talking with spouse and staff appropriately. Call bell in reach. Patient voices no requests or concerns at this time. Awaiting results.

## 2018-07-18 NOTE — ED PROVIDER NOTES
Patient is a 74 y.o. male presenting with back pain. The history is provided by the patient. Back Pain This is a new problem. The current episode started more than 1 week ago. The problem has not changed since onset. The problem occurs constantly. The pain is associated with no known injury. The pain is present in the lower back and left side. Quality: throbbing. The pain is at a severity of 6/10. The pain is moderate (improves with standing). Pertinent negatives include no chest pain, no fever and no abdominal pain. Associated symptoms comments: Some numbness over the left flank coming around to the front. He has tried NSAIDs for the symptoms. Past Medical History:  
Diagnosis Date  Cancer ([facility] [address] melanoma  Gout  Hypercholesterolemia  Hypertension  Skin cancer   
 melanoma (left post shoulder) and BCC x 2 (scalp, right popliteal)  Sun-damaged skin  Thyroid disease Past Surgical History:  
Procedure Laterality Date  COLONOSCOPY N/A 5/29/2018 COLONOSCOPY performed by Teetee Gomez MD at [address] HX HEENT Left   
 partial thyroidectomy [facility] Left knee ACL  HX ORTHOPAEDIC  1979  
 dupytrens contractures  [facility] [address]  SKIN TISSUE PROCEDURE UNLISTED Family History:  
Problem Relation Age of Onset  Diabetes Mother  Heart Disease Mother   
  heart valve replaced  Hypertension Father  Colon Polyps Father  Cancer Brother Stomach Social History Social History  Marital status:  Spouse name: N/A  
 Number of children: N/A  
 Years of education: N/A Occupational History  Not on file. Social History Main Topics  Smoking status: Never Smoker  Smokeless tobacco: Never Used  Alcohol use 0.6 oz/week  
  0 Glasses of wine, 1 Cans of beer per week  Drug use: No  
 Sexual activity: Not Currently   Partners: Female Other Topics Concern  Not on file Social History Narrative ALLERGIES: Review of patient's allergies indicates no known allergies. Review of Systems Constitutional: Negative for chills and fever. Respiratory: Negative for chest tightness and shortness of breath. Cardiovascular: Negative for chest pain. Gastrointestinal: Negative for abdominal pain, blood in stool, nausea and vomiting. Genitourinary: Positive for frequency. Negative for hematuria. Musculoskeletal: Positive for back pain. Negative for neck pain. Skin: Negative for rash and wound. All other systems reviewed and are negative. Vitals:  
 07/18/18 7959 BP: 135/75 Pulse: 76 Resp: 18 Temp: 98.2 °F (36.8 °C) SpO2: 95% Weight: 156 kg (344 lb) Height: 6' 1\" (1.854 m) Physical Exam  
Constitutional: He is oriented to person, place, and time. He appears well-developed and well-nourished. No distress. HENT:  
Head: Normocephalic and atraumatic. Right Ear: Tympanic membrane and ear canal normal.  
Left Ear: Tympanic membrane and ear canal normal.  
Mouth/Throat: Oropharynx is clear and moist.  
Eyes: Conjunctivae and EOM are normal. Pupils are equal, round, and reactive to light. Neck: Normal range of motion. Cardiovascular: Normal rate, regular rhythm, normal heart sounds and intact distal pulses. No murmur heard. Pulmonary/Chest: Effort normal and breath sounds normal. No stridor. No respiratory distress. Abdominal: Soft. Bowel sounds are normal. There is no tenderness. There is no rebound. Minimal llq/ flank tenderness Musculoskeletal: Normal range of motion. He exhibits no edema or tenderness. Back: 
 
Left flank with mild swelling and decreased sensation Neurological: He is alert and oriented to person, place, and time. No cranial nerve deficit. Skin: Skin is warm and dry. He is not diaphoretic. Psychiatric: He has a normal mood and affect.   
Nursing note and vitals reviewed. MDM Number of Diagnoses or Management Options Left flank pain:  
Left-sided low back pain with left-sided sciatica, unspecified chronicity:  
Diagnosis management comments: Left flank and LLQ pain - check labs and get urine and a CT scan to eval for diverticulitis. ddx - diverticular disease, uti, msk pain pain with sciatica Amount and/or Complexity of Data Reviewed Clinical lab tests: ordered and reviewed Tests in the radiology section of CPT®: ordered and reviewed Discuss the patient with other providers: yes (PCP - Dr. Siddharth Silver to arrange for him to see patient get PT referral and outpatient renal US scheduled) ED Course Procedures VITALS:  
Patient Vitals for the past 8 hrs: 
 Temp Pulse Resp BP SpO2  
07/18/18 0838 98.2 °F (36.8 °C) 76 18 135/75 95 % Recent Results (from the past 24 hour(s)) URINALYSIS W/MICROSCOPIC Collection Time: 07/18/18  9:17 AM  
Result Value Ref Range Color YELLOW/STRAW Appearance CLEAR CLEAR Specific gravity 1.010 1.003 - 1.030    
 pH (UA) 6.0 5.0 - 8.0 Protein NEGATIVE  NEG mg/dL Glucose NEGATIVE  NEG mg/dL Ketone NEGATIVE  NEG mg/dL Bilirubin NEGATIVE  NEG Blood NEGATIVE  NEG Urobilinogen 0.2 0.2 - 1.0 EU/dL Nitrites NEGATIVE  NEG Leukocyte Esterase NEGATIVE  NEG    
 WBC 0-4 0 - 4 /hpf  
 RBC 0-5 0 - 5 /hpf Epithelial cells FEW FEW /lpf Bacteria 1+ (A) NEG /hpf Mucus 4+ (A) NEG /lpf Hyaline cast 2-5 0 - 5 /lpf URINE CULTURE HOLD SAMPLE Collection Time: 07/18/18  9:17 AM  
Result Value Ref Range Urine culture hold URINE ON HOLD IN MICROBIOLOGY DEPT FOR 3 DAYS. IF UNPRESERVED URINE IS SUBMITTED, IT CANNOT BE USED FOR ADDITIONAL TESTING AFTER 24 HRS, RECOLLECTION WILL BE REQUIRED. CBC WITH AUTOMATED DIFF Collection Time: 07/18/18  9:17 AM  
Result Value Ref Range WBC 8.5 4.1 - 11.1 K/uL  
 RBC 4.50 4. 10 - 5.70 M/uL  HGB 14.8 12.1 - 17.0 g/dL HCT 43.0 36.6 - 50.3 % MCV 95.6 80.0 - 99.0 FL  
 MCH 32.9 26.0 - 34.0 PG  
 MCHC 34.4 30.0 - 36.5 g/dL  
 RDW 13.2 11.5 - 14.5 % PLATELET 220 132 - 936 K/uL MPV 9.8 8.9 - 12.9 FL  
 NRBC PENDING  WBC ABSOLUTE NRBC PENDING K/uL NEUTROPHILS 66 32 - 75 % LYMPHOCYTES 24 12 - 49 % MONOCYTES 7 5 - 13 % EOSINOPHILS 3 0 - 7 % BASOPHILS 0 0 - 1 %  
 ABS. NEUTROPHILS 5.6 1.8 - 8.0 K/UL  
 ABS. LYMPHOCYTES 2.0 K/UL  
 ABS. MONOCYTES 0.6 0.0 - 1.0 K/UL  
 ABS. EOSINOPHILS 0.3 0.0 - 0.4 K/UL  
 ABS. BASOPHILS 0.0 0.0 - 0.1 K/UL  
 DF SMEAR SCANNED    
 RBC COMMENTS NORMOCYTIC, NORMOCHROMIC XXWBCSUS 1 METABOLIC PANEL, COMPREHENSIVE Collection Time: 07/18/18  9:17 AM  
Result Value Ref Range Sodium 139 136 - 145 mmol/L Potassium 4.3 3.5 - 5.1 mmol/L Chloride 103 97 - 108 mmol/L  
 CO2 28 21 - 32 mmol/L Anion gap 8 5 - 15 mmol/L Glucose 114 (H) 65 - 100 mg/dL BUN 14 6 - 20 MG/DL Creatinine 1.14 0.70 - 1.30 MG/DL  
 BUN/Creatinine ratio 12 12 - 20 GFR est AA >60 >60 ml/min/1.73m2 GFR est non-AA >60 >60 ml/min/1.73m2 Calcium 8.7 8.5 - 10.1 MG/DL Bilirubin, total 0.6 0.2 - 1.0 MG/DL  
 ALT (SGPT) 35 12 - 78 U/L  
 AST (SGOT) 32 15 - 37 U/L Alk. phosphatase 84 45 - 117 U/L Protein, total 8.0 6.4 - 8.2 g/dL Albumin 3.8 3.5 - 5.0 g/dL Globulin 4.2 (H) 2.0 - 4.0 g/dL A-G Ratio 0.9 (L) 1.1 - 2.2 Ct Abd Pelv W Cont Result Date: 7/18/2018 INDICATION: left flank and LLQ pain - eval for diverticulitis COMPARISON: None are available. TECHNIQUE: Following the uneventful intravenous administration of 100 cc Isovue-370, thin axial images were obtained through the abdomen and pelvis. Coronal and sagittal reconstructions were generated. Oral contrast was not administered.  CT dose reduction was achieved through use of a standardized protocol tailored for this examination and automatic exposure control for dose modulation. Adaptive statistical iterative reconstruction (ASIR) was utilized. FINDINGS: LUNG BASES: Clear. INCIDENTALLY IMAGED HEART AND MEDIASTINUM: Unremarkable. LIVER: No mass or biliary dilatation. GALLBLADDER: Unremarkable. SPLEEN: No mass. PANCREAS: No mass or ductal dilatation. ADRENALS: Unremarkable. KIDNEYS: No mass, calculus, or hydronephrosis. Projecting from the left kidney, is an exophytic well demarcated structure measuring 2.7 x 2.4 cm not clearly fluid density. A smaller exophytic structure projecting from the right kidney measures 1.4 cm. STOMACH: Unremarkable. SMALL BOWEL: No dilatation or wall thickening. COLON: No dilatation or wall thickening. APPENDIX: Unremarkable. PERITONEUM: No ascites or pneumoperitoneum. RETROPERITONEUM: No lymphadenopathy or aortic aneurysm. REPRODUCTIVE ORGANS: URINARY BLADDER: No mass or calculus. BONES: No destructive bone lesion. ADDITIONAL COMMENTS: N/A IMPRESSION: Possible small left renal cysts although not clearly fluid density. Recommend elective nonemergent ultrasound examination to confirm cystic character versus CT pre and post intravenous contrast administration. Mild sigmoid colonic diverticulosis. No hydronephrosis.  
 
 
 
1130 - d/c home - treat as sciatica for now - f/u with PCP

## 2018-07-18 NOTE — ED TRIAGE NOTES
Patient presents ambulatory to treatment area with a steady gait. Patient complains of left lower back pain that began about one week PTA. Patient states there has been no improvement since being evaluated for that pain. Patient states pain radiates around to his abdominal area. Patient states that he has had urinary frequency, but no dysuria. Denies fever.

## 2018-07-18 NOTE — ED NOTES
IV access established and blood samples sent to lab for ordered testing. Patient tolerated well. Patient updated regarding plan of care and associated time constraints. Patient verbalizes understanding and agreement. Call bell in reach. Will continue to monitor.

## 2018-07-18 NOTE — ED NOTES
The patient was discharged home by Dr. Rajiv Butler in stable condition. The patient is alert and oriented, in no respiratory distress and discharge vital signs obtained. The patient's diagnosis, condition and treatment were explained. The patient expressed understanding. Three prescriptions given. No work/school note given. A discharge plan has been developed. A  was not involved in the process. Aftercare instructions were given. Pt ambulatory out of the ED.

## 2018-07-19 ENCOUNTER — OFFICE VISIT (OUTPATIENT)
Dept: INTERNAL MEDICINE CLINIC | Age: 68
End: 2018-07-19

## 2018-07-19 VITALS
WEIGHT: 315 LBS | RESPIRATION RATE: 18 BRPM | OXYGEN SATURATION: 96 % | DIASTOLIC BLOOD PRESSURE: 70 MMHG | HEART RATE: 70 BPM | HEIGHT: 73 IN | BODY MASS INDEX: 41.75 KG/M2 | SYSTOLIC BLOOD PRESSURE: 115 MMHG | TEMPERATURE: 98.7 F

## 2018-07-19 DIAGNOSIS — N28.1 RENAL CYST, LEFT: ICD-10-CM

## 2018-07-19 DIAGNOSIS — M54.42 ACUTE LEFT-SIDED LOW BACK PAIN WITH LEFT-SIDED SCIATICA: Primary | ICD-10-CM

## 2018-07-19 NOTE — PROGRESS NOTES
HISTORY OF PRESENT ILLNESS  Abigail Martins is a 74 y.o. male. HPI  Problem follow up:  Abigail Martins returns for follow up visit regarding L low back pain. he was seen 1 days ago in [facility] diagnosed with low back pain, sciatica and treated with steroid, muscle relaxant and hydrocodone. Workup was significant for   INDICATION: left flank and LLQ pain - eval for diverticulitis     COMPARISON: None are available.     TECHNIQUE:   Following the uneventful intravenous administration of 100 cc Isovue-370, thin  axial images were obtained through the abdomen and pelvis. Coronal and sagittal  reconstructions were generated. Oral contrast was not administered. CT dose  reduction was achieved through use of a standardized protocol tailored for this  examination and automatic exposure control for dose modulation. Adaptive  statistical iterative reconstruction (ASIR) was utilized.     FINDINGS:   LUNG BASES: Clear. INCIDENTALLY IMAGED HEART AND MEDIASTINUM: Unremarkable. LIVER: No mass or biliary dilatation. GALLBLADDER: Unremarkable. SPLEEN: No mass. PANCREAS: No mass or ductal dilatation. ADRENALS: Unremarkable. KIDNEYS: No mass, calculus, or hydronephrosis. Projecting from the left kidney,  is an exophytic well demarcated structure measuring 2.7 x 2.4 cm not clearly  fluid density. A smaller exophytic structure projecting from the right kidney  measures 1.4 cm. STOMACH: Unremarkable. SMALL BOWEL: No dilatation or wall thickening. COLON: No dilatation or wall thickening. APPENDIX: Unremarkable. PERITONEUM: No ascites or pneumoperitoneum. RETROPERITONEUM: No lymphadenopathy or aortic aneurysm. REPRODUCTIVE ORGANS:  URINARY BLADDER: No mass or calculus. BONES: No destructive bone lesion. ADDITIONAL COMMENTS: N/A     IMPRESSION  IMPRESSION:  Possible small left renal cysts although not clearly fluid density.  Recommend  elective nonemergent ultrasound examination to confirm cystic character versus  CT pre and post intravenous contrast administration.     Mild sigmoid colonic diverticulosis.     No hydronephrosis.           .  Notes, labs, studies, imaging related to this problem during prior visit were available . Since that visit, he has improved. he has been compliant with prescribed treatment. Residual symptoms include: L lumbosacral pain   New issues associated with this problem include: none. ROS    Physical Exam   Musculoskeletal:   Current vital are: /70 (BP 1 Location: Left arm, BP Patient Position: Sitting)  Pulse 70  Temp 98.7 °F (37.1 °C) (Oral)   Resp 18  Ht 6' 1\" (1.854 m)  Wt 342 lb (155.1 kg)  SpO2 96%  BMI 45.12 kg/m2   Patient appears to be in no pain, non antalgic gait noted. Lumbosacral spine area reveals mild local tenderness. Painful LS ROM noted. Spinal alignment is normal.  Straight leg raise is negative at 90 degrees on both sides. Lumbosacral distribution DTR's, motor strength and sensation are normal, including heel and toe gait. Peripheral pulses are palpable and 2 plus. Prior studies inlcude: Lumbar spine X-Ray: not indicated. MRI not indicated         ASSESSMENT and PLAN  Diagnoses and all orders for this visit:    1. Acute left-sided low back pain with left-sided sciatica - Richie Camera was counseled on causes and treatment of low back pain. he was advised on need for stretching and strengthening exercises. .  he was also counseled on medications like antiinflammatories, muscle relaxants, analgesics, heat or ice if they were prescribed. he is advised to call our office immediately for any new symptoms like weakness, numbness or worsening pain should they develop. he will follow up with me if not improving over 2-4 weeks. I recommended PT. He will consider. 2. Renal cyst, left - incidental finding. Confirm cyst with US  -     US RETROPERITONEUM COMP; Future      Follow-up Disposition:  Return in about 2 months (around 9/19/2018).

## 2018-07-19 NOTE — MR AVS SNAPSHOT
[facility] 
 
 
 [address] [facility] [address] [facility] 
[phone] Patient: Lauren Willis 
MRN: [id number] JYS:[birth year] Visit Information Date & Time Provider Department Dept. Phone Encounter #  
 7/19/2018 11:00 AM Cielo Toscano MD [facility] [id number] Follow-up Instructions Return in about 2 months (around 9/19/2018). Your Appointments 7/24/2018 10:30 AM  
Office Visit with Mario Larson NP [facility] [address] [place] [address]) Appt Note: est. pt. 3 month skin exam  
 [facility] [address]  
[address] [place], [place] [address] [facility] [id number]  
  
    
 9/21/2018  1:15 PM  
ROUTINE CARE with Cielo Toscano MD  
[facility] [address]) Appt Note: 6 month f/u tr 3/13/18  
 [address]icia Omer [address] [facility] [address] [address]  
[phone]  
  
   
 [address] [facility] [address] Upcoming Health Maintenance Date Due  
 GLAUCOMA SCREENING Q2Y 10/23/2017 Pneumococcal 65+ High/Highest Risk (1 of 2 - PCV13) 10/23/2017 Influenza Age 5 to Adult 8/1/2018 MEDICARE YEARLY EXAM 3/14/2019 COLONOSCOPY 5/29/2023 DTaP/Tdap/Td series (2 - Td) 11/24/2024 Allergies as of 7/19/2018  Review Complete On: 7/19/2018 By: Amna Cruz LPN No Known Allergies Current Immunizations  Reviewed on 3/13/2018 Name Date Influenza High Dose Vaccine PF 11/15/2017, 10/5/2016 Influenza Vaccine 11/24/2015, 10/1/2015 Pneumococcal Conjugate (PCV-13) 4/24/2018 Tdap 11/24/2014 Zoster Recombinant 4/24/2018 Zoster Vaccine, Live 1/21/2016 Not reviewed this visit You Were Diagnosed With   
  
 Codes Comments Acute left-sided low back pain with left-sided sciatica    -  Primary ICD-10-CM: M54.42 
ICD-9-CM: 724.2, 724.3 Renal cyst, left     ICD-10-CM: N28.1 ICD-9-CM: 753.10 Vitals BP Pulse Temp Resp Height(growth percentile) Weight(growth percentile) 115/70 (BP 1 Location: Left arm, BP Patient Position: Sitting) 70 98.7 °F (37.1 °C) (Oral) 18 6' 1\" (1.854 m) 342 lb (155.1 kg) SpO2 BMI Smoking Status 96% 45.12 kg/m2 Never Smoker Vitals History BMI and BSA Data Body Mass Index Body Surface Area  
 45.12 kg/m 2 2.83 m 2 Preferred Pharmacy Pharmacy Name Phone [id number] [address] [phone] Your Updated Medication List  
  
   
This list is accurate as of 7/19/18 11:23 AM.  Always use your most recent med list.  
  
  
  
  
 allopurinol 300 mg tablet Commonly known as:  Kelley Dakins Take 1 Tab by mouth daily. atorvastatin 10 mg tablet Commonly known as:  LIPITOR Take 1 Tab by mouth every evening. calcium 500 mg Tab Take  by mouth. With potassium  
  
 cholecalciferol (vitamin D3) 2,000 unit Tab Take  by mouth. furosemide 20 mg tablet Commonly known as:  LASIX Take 1 Tab by mouth daily. HYDROcodone-acetaminophen 7.5-325 mg per tablet Commonly known as:  Noah Dolphin Take 1 Tab by mouth every eight (8) hours as needed for Pain. Max Daily Amount: 3 Tabs. Do not drive for 6 hours after taking, may impair ability to drive  
  
 indomethacin SR 75 mg SR capsule Commonly known as:  INDOCIN SR Take 75 mg by mouth daily as needed. ketoconazole 2 % topical cream  
Commonly known as:  NIZORAL Apply  to affected area two (2) times a day. Use thin layer under each axilla for rash for 10-14 days. magnesium 250 mg Tab Take 400 mg by mouth daily. methocarbamol 750 mg tablet Commonly known as:  ROBAXIN Take 1 Tab by mouth four (4) times daily. Do not drive for 6 hours after taking, may impair ability to drive Omega-3 Fatty Acids 300 mg Cap Take  by mouth. predniSONE 10 mg dose pack Commonly known as:  STERAPRED DS Take as directed on package  
  
 triamcinolone acetonide 0.1 % topical cream  
Commonly known as:  KENALOG Apply  to affected area two (2) times a day. use thin layer  
  
 valsartan 160 mg tablet Commonly known as:  DIOVAN Take 1 Tab by mouth daily. verapamil  mg CR tablet Commonly known as:  CALAN-SR  
TAKE 1 TABLET BY MOUTH TWICE A DAY Follow-up Instructions Return in about 2 months (around 9/19/2018). To-Do List   
 07/27/2018 Imaging:  US RETROPERITONEUM COMP Introducing [facility]! Dear Jam Leigh: 
Thank you for requesting a [employer] account. Our records indicate that you already have an active [employer] account. You can access your account anytime at [email or web address] Did you know that you can access your hospital and ER discharge instructions at any time in [employer]? You can also review all of your test results from your hospital stay or ER visit. Additional Information If you have questions, please visit the Frequently Asked Questions section of the [employer]unny Or [employer] website at [email or web address] Remember, Funny Or Die is NOT to be used for urgent needs. For medical emergencies, dial 911. Now available from your iPhone and Android! Please provide this summary of care documentation to your next provider. Your primary care clinician is listed as Taylor Cantu. If you have any questions after today's visit, please call [phone].

## 2018-07-23 ENCOUNTER — HOSPITAL ENCOUNTER (OUTPATIENT)
Dept: ULTRASOUND IMAGING | Age: 68
Discharge: HOME | End: 2018-07-23
Attending: INTERNAL MEDICINE
Payer: MEDICARE

## 2018-07-23 DIAGNOSIS — N28.1 RENAL CYST, LEFT: ICD-10-CM

## 2018-07-23 PROCEDURE — 76770 US EXAM ABDO BACK WALL COMP: CPT

## 2018-07-24 ENCOUNTER — OFFICE VISIT (OUTPATIENT)
Dept: DERMATOLOGY | Facility: AMBULATORY SURGERY CENTER | Age: 68
End: 2018-07-24

## 2018-07-24 ENCOUNTER — HOSPITAL ENCOUNTER (OUTPATIENT)
Dept: LAB | Age: 68
Discharge: HOME | End: 2018-07-24

## 2018-07-24 VITALS
SYSTOLIC BLOOD PRESSURE: 110 MMHG | DIASTOLIC BLOOD PRESSURE: 72 MMHG | HEIGHT: 73 IN | HEART RATE: 64 BPM | RESPIRATION RATE: 18 BRPM | BODY MASS INDEX: 41.75 KG/M2 | TEMPERATURE: 97.7 F | WEIGHT: 315 LBS | OXYGEN SATURATION: 95 %

## 2018-07-24 DIAGNOSIS — Z08 FOLLOW-UP SURVEILLANCE OF SKIN CANCER, ENCOUNTER FOR: ICD-10-CM

## 2018-07-24 DIAGNOSIS — D22.9 MULTIPLE BENIGN NEVI: ICD-10-CM

## 2018-07-24 DIAGNOSIS — D48.5 NEOPLASM OF UNCERTAIN BEHAVIOR OF SKIN OF CHEEK: Primary | ICD-10-CM

## 2018-07-24 DIAGNOSIS — L82.1 SEBORRHEIC KERATOSES: ICD-10-CM

## 2018-07-24 DIAGNOSIS — Z85.828 FOLLOW-UP SURVEILLANCE OF SKIN CANCER, ENCOUNTER FOR: ICD-10-CM

## 2018-07-24 DIAGNOSIS — Z85.820 PERSONAL HISTORY OF MALIGNANT MELANOMA OF SKIN: ICD-10-CM

## 2018-07-24 DIAGNOSIS — Z85.828 HISTORY OF NONMELANOMA SKIN CANCER: ICD-10-CM

## 2018-07-24 DIAGNOSIS — L90.5 SCAR: ICD-10-CM

## 2018-07-24 DIAGNOSIS — D18.01 CHERRY ANGIOMA: ICD-10-CM

## 2018-07-24 NOTE — MR AVS SNAPSHOT
[facility] [address] 
[phone] Patient: Delmis Rodriguez 
MRN: [id number] Visit Information Date & Time Provider Department Dept. Phone Encounter #  
 7/24/2018 10:30 AM PABLITO Burt 8057 [phone] [id number] Your Appointments 9/21/2018  1:15 PM  
ROUTINE CARE with Eliana Draper MD  
[facility]: 6 month f/u tr 3/13/18  
 [place] [address]  
[phone]  
  
   
 [address] [facility] [address] Upcoming Health Maintenance Date Due  
 GLAUCOMA SCREENING Q2Y 10/23/2017 Pneumococcal 65+ High/Highest Risk (2 of 2 - PPSV23) 6/19/2018 Influenza Age 5 to Adult 8/1/2018 MEDICARE YEARLY EXAM 3/14/2019 COLONOSCOPY 5/29/2023 DTaP/Tdap/Td series (2 - Td) 11/24/2024 Allergies as of 7/24/2018  Review Complete On: 7/19/2018 By: Delfino Henriquez LPN No Known Allergies Current Immunizations  Reviewed on 3/13/2018 Name Date Influenza High Dose Vaccine PF 11/15/2017, 10/5/2016 Influenza Vaccine 11/24/2015, 10/1/2015 Pneumococcal Conjugate (PCV-13) 4/24/2018 Tdap 11/24/2014 Zoster Recombinant 4/24/2018 Zoster Vaccine, Live 1/21/2016 Not reviewed this visit Vitals BP Pulse Temp Resp Height(growth percentile) Weight(growth percentile) 110/72 (BP 1 Location: Left arm, BP Patient Position: Sitting) 64 97.7 °F (36.5 °C) (Oral) 18 6' 1\" (1.854 m) 341 lb (154.7 kg) SpO2 BMI Smoking Status 95% 44.99 kg/m2 Never Smoker Vitals History BMI and BSA Data Body Mass Index Body Surface Area 44.99 kg/m 2 2.82 m 2 Preferred Pharmacy Pharmacy Name Phone  [facility] [phone] Your Updated Medication List  
  
   
 This list is accurate as of 7/24/18 11:09 AM.  Always use your most recent med list.  
  
  
  
  
 allopurinol 300 mg tablet Commonly known as:  Umm Alfaro Take 1 Tab by mouth daily. atorvastatin 10 mg tablet Commonly known as:  LIPITOR Take 1 Tab by mouth every evening. calcium 500 mg Tab Take  by mouth. With potassium  
  
 cholecalciferol (vitamin D3) 2,000 unit Tab Take  by mouth. furosemide 20 mg tablet Commonly known as:  LASIX Take 1 Tab by mouth daily. HYDROcodone-acetaminophen 7.5-325 mg per tablet Commonly known as:  Jennifer Mura Take 1 Tab by mouth every eight (8) hours as needed for Pain. Max Daily Amount: 3 Tabs. Do not drive for 6 hours after taking, may impair ability to drive  
  
 indomethacin SR 75 mg SR capsule Commonly known as:  INDOCIN SR Take 75 mg by mouth daily as needed. ketoconazole 2 % topical cream  
Commonly known as:  NIZORAL Apply  to affected area two (2) times a day. Use thin layer under each axilla for rash for 10-14 days. magnesium 250 mg Tab Take 400 mg by mouth daily. methocarbamol 750 mg tablet Commonly known as:  ROBAXIN Take 1 Tab by mouth four (4) times daily. Do not drive for 6 hours after taking, may impair ability to drive Omega-3 Fatty Acids 300 mg Cap Take  by mouth.  
  
 predniSONE 10 mg dose pack Commonly known as:  STERAPRED DS Take as directed on package  
  
 triamcinolone acetonide 0.1 % topical cream  
Commonly known as:  KENALOG Apply  to affected area two (2) times a day. use thin layer  
  
 valsartan 160 mg tablet Commonly known as:  DIOVAN Take 1 Tab by mouth daily. verapamil  mg CR tablet Commonly known as:  CALAN-SR  
TAKE 1 TABLET BY MOUTH TWICE A DAY Patient Instructions WOUND CARE INSTRUCTIONS 1. Keep the dressing clean and dry and do not remove for 48 hours. 2. Then change the dressing once a day as follows: a. Wash hands before and after each dressing change. b. Remove dressing and wash site gently with mild soap and water, rinse, and pat dry. 
c. Apply an ointment (Bacitracin, Polysporin, Neosporin, Petroleum jelly or Aquaphor). d. Apply a non-stick (Telfa) dressing or Band-Aid to cover the wound. Remove pressure bandage on Thursday, then wash gently and apply a thin layer of Vaseline and a band-aid to site daily for 1 week. 3. Watch for: BLEEDING: A small amount of drainage may occur. If bleeding occurs, elevate and rest the surgery site. Apply gauze and steady pressure for 15 minutes. If bleeding continues, call this office. INFECTION: Signs of infection include increased redness, pain, warmth, drainage of pus, and fever. If this occurs, call this office. 4. Special Instructions (follow any that are checked): 
· [x] You have stitches that DO NOT need to be removed. · [x] Avoid bending at the waist and heavy lifting for two days. · [x] Sleep with your head elevated for the next two nights. · [x] Rest the surgery site and keep it elevated as much as possible for two days. · [x] You may apply an ice-pack for 10-15 minutes every waking hour for the rest of the day. · [] Eat a soft diet and avoid hot food and hot drinks for the rest of the day. · [] Other instructions: Follow up as directed. Take Tylenol or Ibuprofen for pain as needed. Once the site is healed with no remaining bandages or open areas, protect your surgical site and scar from the sun, as this area will be more sensitive. Use a broad spectrum sunscreen SPF 30 or higher daily, and a chemical free product (one containing zinc oxide or titanium dioxide) is a good choice if the area is sensitive. You may begin to gently massage the surgical site in 2-3 weeks, rubbing in a circular motion along the scar. This can help reduce swelling and thickness of a scar. A scar cream may be used beginnning 1 month after the surgery. If you have any questions or concerns, please call our office Monday through Friday at [phone]. Introducing [facility] SERVICES! Dear Maicol Aldridge: 
Thank you for requesting a [employer] account. Our records indicate that you already have an active [employer] account. You can access your account anytime at [email or web address] Did you know that you can access your hospital and ER discharge instructions at any time in [employer]? You can also review all of your test results from your hospital stay or ER visit. Additional Information If you have questions, please visit the Frequently Asked Questions section of the [employer] website at [email or web address] Remember, [employer] is NOT to be used for urgent needs. For medical emergencies, dial 911. Now available from your iPhone and Android! Please provide this summary of care documentation to your next provider. Your primary care clinician is listed as Ruby Augustin. If you have any questions after today's visit, please call [phone].

## 2018-07-24 NOTE — PROGRESS NOTES
This note was written by Matt Burnham, as dictated by Cris Norton MD.     CC: Suspected basal cell carcinoma on the left cheek     HPI:  Mr.. Debbie Giron is a 74 y.o. male who presents for the evaluation of a lesion on the left cheek. The patient was referred by Bethany Stahl DNP for this evaluation. He states that the lesion appeared a few months ago. The patient has not had prior treatment for this lesion. Associated symptoms include persistent lesion. Exam:  Debbie Giron is a 74 y.o. male who appears well and in no distress. He is awake, alert, and oriented. There is no preauricular, submandibular, or cervical lymphadenopathy. A limited skin examination was completed including his left cheek. He has a 4 x 3 mm pearly papule with focal pigmentation on his left cheek consistent with basal cell carcinoma. Assessment/Plan:    1. Suspected basal cell carcinoma, left cheek. Excision was advised for removal and therapy of this 4 x 3 mm lesion on the left cheek. The excision procedure was reviewed and verbal and written consent were obtained. The risks of pain, bleeding, infection, recurrence of the lesion, and scar were discussed. I performed the procedure. The site was cleansed and anesthetized with 1% lidocaine with epinephrine 1:100,000. The lesion was excised with a 2 mm margin in an elliptical manner to a depth of the subcutaneous tissue. An intermediate repair was performed after the excision. 6-0 polysorb suture was used for approximation of deep tissues and a second layer of 6-0 polysorb was used to approximate the skin edges. The closure length was 30 mm. The wound was bandaged and care reviewed. The specimen was sent to pathology. I will contact the patient with the results and any further treatment that may be necessary. This scribe documentation was reviewed by me and accurately reflects the examination and decisions made by me.     [facility]   OFFICE PROCEDURE PROGRESS NOTE     Chart reviewed for the following:     Hailee CLEMENTE MD, have reviewed the History, Physical and updated the Allergic reactions for [address] performed immediately prior to start of procedure:     Geronimo Carroll. Jessie Ordoñez MD, have performed the following reviews on Deric Concepcion prior to the start of the procedure:     * Patient was identified by name and date of birth   * Agreement on procedure being performed was verified   * Risks and Benefits explained to the patient   * Procedure site verified and marked as necessary   * Patient was positioned for comfort   * Consent was signed and verified     Time: 11:15 AM  Date of procedure: 7/24/2018  Procedure performed by: Nikolay Gonzalez.  Jessie Ordoñez MD   Provider assisted by: LPN   Patient assisted by: self   How tolerated by patient: tolerated the procedure well with no complications   Comments: none

## 2018-07-24 NOTE — PROGRESS NOTES
Chief Complaint   Patient presents with    Skin Exam         1. Have you been to the ER, urgent care clinic since your last visit? Yes [facility] ER. Hospitalized since your last visit no    2. Have you seen or consulted any other health care providers outside of the [facility] since your last visit? Include any pap smears or colon screening.   no

## 2018-07-24 NOTE — PROGRESS NOTES
Name: Deric Concepcion       Age: 74 y.o. Date: 7/24/2018    Chief Complaint:   Chief Complaint   Patient presents with    Skin Exam       Subjective:    HPI  Mr. Deric Concepcion is a 74 y.o. male who presents for a full skin exam.  The patient's last skin exam was 3 months ago and the patient does not have current complaints related to his skin. He reports no concerns for lesion recurrence. The patient's pertinent skin history includes : stage 1a malignant melanoma of the left posterior shoulder (depth 0.3mm) tx 2015, BCC left posterior shoulder, scalp, left popliteal area    ROS: Constitutional: Negative. Dermatological : positive for - improvement of rash      Social History     Social History    Marital status:      Spouse name: N/A    Number of children: N/A    Years of education: N/A     Occupational History    Not on file.      Social History Main Topics    Smoking status: Never Smoker    Smokeless tobacco: Never Used    Alcohol use 0.6 oz/week     0 Glasses of wine, 1 Cans of beer per week    Drug use: No    Sexual activity: Not Currently     Partners: Female     Other Topics Concern    Not on file     Social History Narrative       Family History   Problem Relation Age of Onset    Diabetes Mother     Heart Disease Mother      heart valve replaced    Hypertension Father     Colon Polyps Father     Cancer Brother      Stomach        Past Medical History:   Diagnosis Date    Cancer ([address]     melanoma     Gout     Hypercholesterolemia     Hypertension     Skin cancer     melanoma (left post shoulder) and BCC x 2 (scalp, right popliteal)    Sun-damaged skin     Thyroid disease        Past Surgical History:   Procedure Laterality Date    COLONOSCOPY N/A 5/29/2018    COL[facility] ENDOSCOPY    HX HEENT Left     partial thyroidectomy    HX ORTHOPAEDIC  1969    Left knee ACL     HX ORTHOPAEDIC  1979    dupytrens contractures    HX THYROIDECTOMY  05/2013    HX TONSILLECTOMY  1950    SKIN TISSUE PROCEDURE UNLISTED         Current Outpatient Prescriptions   Medication Sig Dispense Refill    predniSONE (STERAPRED DS) 10 mg dose pack Take as directed on package 48 Tab 0    methocarbamol (ROBAXIN) 750 mg tablet Take 1 Tab by mouth four (4) times daily. Do not drive for 6 hours after taking, may impair ability to drive 30 Tab 0    verapamil ER (CALAN-SR) 240 mg CR tablet TAKE 1 TABLET BY MOUTH TWICE A  Tab 1    furosemide (LASIX) 20 mg tablet Take 1 Tab by mouth daily. 90 Tab 1    atorvastatin (LIPITOR) 10 mg tablet Take 1 Tab by mouth every evening. 90 Tab 1    valsartan (DIOVAN) 160 mg tablet Take 1 Tab by mouth daily. 90 Tab 1    allopurinol (ZYLOPRIM) 300 mg tablet Take 1 Tab by mouth daily. 90 Tab 1    triamcinolone acetonide (KENALOG) 0.1 % topical cream Apply  to affected area two (2) times a day. use thin layer 45 g 3    ketoconazole (NIZORAL) 2 % topical cream Apply  to affected area two (2) times a day. Use thin layer under each axilla for rash for 10-14 days. 60 g 3    cholecalciferol, vitamin D3, 2,000 unit tab Take  by mouth.  Omega-3 Fatty Acids 300 mg cap Take  by mouth.  calcium 500 mg tab Take  by mouth. With potassium      magnesium 250 mg tab Take 400 mg by mouth daily.  indomethacin SR (INDOCIN SR) 75 mg SR capsule Take 75 mg by mouth daily as needed.  HYDROcodone-acetaminophen (NORCO) 7.5-325 mg per tablet Take 1 Tab by mouth every eight (8) hours as needed for Pain. Max Daily Amount: 3 Tabs.  Do not drive for 6 hours after taking, may impair ability to drive 20 Tab 0       No Known Allergies      Objective:    Visit Vitals    /72 (BP 1 Location: Left arm, BP Patient Position: Sitting)    Pulse 64    Temp 97.7 °F (36.5 °C) (Oral)    Resp 18    Ht 6' 1\" (1.854 m)    Wt 154.7 kg (341 lb)    SpO2 95%    BMI 44.99 kg/m2       Kevin Powell is a 74 y.o. male who appears well and in no distress. He is awake, alert, and oriented. There is no preauricular, submandibular, or cervical lymphadenopathy. A skin examination was performed including his scalp, face (including eyelids), ears, neck, chest, back, abdomen, upper extremities (including digits/nails), lower extremities, breasts, buttocks; genital skin was not examined. There is a 4 x 3 mm pink shiny papule with telangiectasias and dots of irregular pigment on the left cheek consistent with BCC. There are multiple well healed scars - no evidence of lesion recurrence. Specifically no nodularity or pigment to suggest lesion recurrence. There is no associated left axillary lymphadenopathy. There are numerous skin tags in each axilla. He has scattered seborrheic keratoses, numerous nevi without concerning features for severe atypia. Most of his nevi are medium and dark brown junctional.  There are scattered cherry angiomas. Assessment/Plan:  1. Neoplasm of Uncertain Behavior, left cheek. The differential diagnoses were discussed. Excision today with Dr. Sophie Vann. 2.Seborrheic keratoses. The diagnosis was reviewed and the patient was reassured that no treatment is needed for these benign lesions. 3.Normal nevi. The diagnosis of normal nevi was reviewed. I discussed sun protection, sunscreen use, the warning signs of skin cancer, the need for self-skin examinations, and the need for regular practitioner exams every 4 months. The patient should follow up sooner as needed if new, changing, or symptomatic skin lesions arise. 4.Cherry angiomas. The diagnosis was reviewed and the patient was reassured that no treatment is needed for these benign lesions. 5.Personal history of skin cancer, melanoma and non melanoma skin cancers. I discussed sun protection, sunscreen use, the warning signs of skin cancer, the need for self-skin examinations, and the need for regular practitioner exams every 4 months.   The patient should follow up sooner as needed if new, changing, or symptomatic skin lesions arise.

## 2018-07-24 NOTE — PATIENT INSTRUCTIONS
WOUND CARE INSTRUCTIONS    1. Keep the dressing clean and dry and do not remove for 48 hours. 2. Then change the dressing once a day as follows:  a. Wash hands before and after each dressing change. b. Remove dressing and wash site gently with mild soap and water, rinse, and pat dry.  c. Apply an ointment (Bacitracin, Polysporin, Neosporin, Petroleum jelly or Aquaphor). d. Apply a non-stick (Telfa) dressing or Band-Aid to cover the wound. Remove pressure bandage on Thursday, then wash gently and apply a thin layer of Vaseline and a band-aid to site daily for 1 week. 3. Watch for:  BLEEDING: A small amount of drainage may occur. If bleeding occurs, elevate and rest the surgery site. Apply gauze and steady pressure for 15 minutes. If bleeding continues, call this office. INFECTION: Signs of infection include increased redness, pain, warmth, drainage of pus, and fever. If this occurs, call this office. 4. Special Instructions (follow any that are checked):  · [x] You have stitches that DO NOT need to be removed. · [x] Avoid bending at the waist and heavy lifting for two days. · [x] Sleep with your head elevated for the next two nights. · [x] Rest the surgery site and keep it elevated as much as possible for two days. · [x] You may apply an ice-pack for 10-15 minutes every waking hour for the rest of the day. · [] Eat a soft diet and avoid hot food and hot drinks for the rest of the day. · [] Other instructions: Follow up as directed. Take Tylenol or Ibuprofen for pain as needed. Once the site is healed with no remaining bandages or open areas, protect your surgical site and scar from the sun, as this area will be more sensitive. Use a broad spectrum sunscreen SPF 30 or higher daily, and a chemical free product (one containing zinc oxide or titanium dioxide) is a good choice if the area is sensitive.     You may begin to gently massage the surgical site in 2-3 weeks, rubbing in a circular motion along the scar. This can help reduce swelling and thickness of a scar. A scar cream may be used beginnning 1 month after the surgery. If you have any questions or concerns, please call our office Monday through Friday at [phone].

## 2018-08-21 ENCOUNTER — TELEPHONE (OUTPATIENT)
Dept: INTERNAL MEDICINE CLINIC | Age: 68
End: 2018-08-21

## 2018-08-21 RX ORDER — OLMESARTAN MEDOXOMIL 20 MG/1
20 TABLET ORAL DAILY
Qty: 90 TAB | Refills: 1 | Status: SHIPPED | OUTPATIENT
Start: 2018-08-21 | End: 2018-09-21 | Stop reason: SDUPTHER

## 2018-08-21 NOTE — TELEPHONE ENCOUNTER
Regarding: Prescription Question  ----- Message from Jabari Sotomayor LPN sent at 1/62/1615  9:32 AM EDT -----  Patient requesting new medication due to recall     ----- Message from [facility] to Karen Darden MD sent at 8/20/2018  9:04 AM -----   I received a letter from [facility] that there had been a recall on my Valstartan (Verapamil)  by the . If you want to change the medication, please send the prescription to the [facility] in [place] at [address] Thank you for your attention.

## 2018-08-27 ENCOUNTER — OFFICE VISIT (OUTPATIENT)
Dept: INTERNAL MEDICINE CLINIC | Age: 68
End: 2018-08-27

## 2018-08-27 VITALS
HEART RATE: 61 BPM | RESPIRATION RATE: 18 BRPM | BODY MASS INDEX: 41.75 KG/M2 | DIASTOLIC BLOOD PRESSURE: 64 MMHG | OXYGEN SATURATION: 97 % | HEIGHT: 73 IN | WEIGHT: 315 LBS | SYSTOLIC BLOOD PRESSURE: 94 MMHG | TEMPERATURE: 98.5 F

## 2018-08-27 DIAGNOSIS — Z71.84 TRAVEL ADVICE ENCOUNTER: Primary | ICD-10-CM

## 2018-08-27 RX ORDER — ACETAZOLAMIDE 250 MG/1
250 TABLET ORAL 2 TIMES DAILY
Qty: 10 TAB | Refills: 0
Start: 2018-08-27 | End: 2018-09-21 | Stop reason: ALTCHOICE

## 2018-08-27 RX ORDER — ACETAZOLAMIDE 250 MG/1
250 TABLET ORAL 2 TIMES DAILY
Qty: 10 TAB | Refills: 0 | Status: SHIPPED | OUTPATIENT
Start: 2018-08-27 | End: 2018-08-27 | Stop reason: SDUPTHER

## 2018-08-27 NOTE — MR AVS SNAPSHOT
[facility] 
 
 
 [address] 1007 [facility] 
[phone] Patient: Barney Tubbs 
MRN: [id number] Visit Information Date & Time Provider Department Dept. Phone Encounter #  
 8/27/2018  9:45 AM Pietro Aleman MD [facility] [id number] Your Appointments 9/21/2018  1:15 PM  
ROUTINE CARE with Pietro Aleman MD  
[facility] [place] Fariha Larve) Appt Note: 6 month f/u tr 3/13/18  
 [address] Titi Guardian AlAbram Santamaria [address]  
  
   
 Kai Huerta [address] [phone]  
  
    
 11/20/2018 11:00 AM  
Office Visit with PABLITO Gonzalez [address] Fariha Larve) Appt Note: est. pt. 4 month skin exam  
 [facility] [place] [address] Upcoming Health Maintenance Date Due  
 GLAUCOMA SCREENING Q2Y 10/23/2017 Pneumococcal 65+ High/Highest Risk (2 of 2 - PPSV23) 6/19/2018 Influenza Age 5 to Adult 8/1/2018 MEDICARE YEARLY EXAM 3/14/2019 COLONOSCOPY 5/29/2023 DTaP/Tdap/Td series (2 - Td) 11/24/2024 Allergies as of 8/27/2018  Review Complete On: 8/27/2018 By: Jeison Bobo LPN No Known Allergies Current Immunizations  Reviewed on 3/13/2018 Name Date Influenza High Dose Vaccine PF 11/15/2017, 10/5/2016 Influenza Vaccine 11/24/2015, 10/1/2015 Pneumococcal Conjugate (PCV-13) 4/24/2018 Tdap 11/24/2014 Zoster Recombinant 4/24/2018 Zoster Vaccine, Live 1/21/2016 Not reviewed this visit You Were Diagnosed With   
  
 Codes Comments Travel advice encounter    -  Primary ICD-10-CM: Z71.89 ICD-9-CM: V65.49 Vitals BP Pulse Temp Resp Height(growth percentile) Weight(growth percentile) 94/64 (BP 1 Location: Left arm, BP Patient Position: Sitting) 61 98.5 °F (36.9 °C) (Oral) 18 6' 1\" (1.854 m) 342 lb 2 oz (155.2 kg) SpO2 BMI Smoking Status 97% 45.14 kg/m2 Never Smoker Vitals History BMI and BSA Data Body Mass Index Body Surface Area  
 45.14 kg/m 2 2.83 m 2 Preferred Pharmacy Pharmacy Name Phone [facility] [phone] Your Updated Medication List  
  
   
This list is accurate as of 8/27/18 10:05 AM.  Always use your most recent med list.  
  
  
  
  
 acetaZOLAMIDE 250 mg tablet Commonly known as:  DIAMOX Take 1 Tab by mouth two (2) times a day for 30 days. allopurinol 300 mg tablet Commonly known as:  Meño Needle Take 1 Tab by mouth daily. atorvastatin 10 mg tablet Commonly known as:  LIPITOR Take 1 Tab by mouth every evening. calcium 500 mg Tab Take  by mouth. With potassium  
  
 cholecalciferol (vitamin D3) 2,000 unit Tab Take  by mouth. furosemide 20 mg tablet Commonly known as:  LASIX Take 1 Tab by mouth daily. indomethacin SR 75 mg SR capsule Commonly known as:  INDOCIN SR Take 75 mg by mouth daily as needed. ketoconazole 2 % topical cream  
Commonly known as:  NIZORAL Apply  to affected area two (2) times a day. Use thin layer under each axilla for rash for 10-14 days. magnesium 250 mg Tab Take 400 mg by mouth daily. olmesartan 20 mg tablet Commonly known as:  [employer]ands Take 1 Tab by mouth daily. Omega-3 Fatty Acids 300 mg Cap Take  by mouth.  
  
 triamcinolone acetonide 0.1 % topical cream  
Commonly known as:  KENALOG Apply  to affected area two (2) times a day. use thin layer  
  
 verapamil  mg CR tablet Commonly known as:  CALAN-SR  
TAKE 1 TABLET BY MOUTH TWICE A DAY Prescriptions Printed  Refills  
 acetaZOLAMIDE (DIAMOX) 250 mg tablet 0  
 Sig: Take 1 Tab by mouth two (2) times a day for 30 days. Class: Print Route: Oral  
  
Patient Instructions Learning About Altitude Sickness What is altitude sickness? Altitude sickness occurs when you cannot get enough oxygen from the air at high altitudes. It happens most often when people who are not used to high altitudes go quickly from lower altitudes to 8,000 ft or higher. When you go too high too fast, your body cannot adjust. 
Altitude sickness can range from mild to life-threatening. With good planning, such as ascending slowly or taking certain medicines, it is often preventable. What are the symptoms? The symptoms of altitude sickness may be mild to severe. Symptoms may include: · A headache, which may get worse during the night and when you wake up. · Weakness. · Feeling sick to your stomach. You may vomit. · Feeling dizzy. · Being confused. · Breathing problems. When altitude sickness is severe, it may be deadly. Your symptoms may not start until a day after you have been at a high altitude. Many people say altitude sickness feels like having a hangover. How can you prevent it? You may be able to prevent altitude sickness by taking your time when you go to high altitudes and using medicine in advance. · If you go to altitudes higher than 8,000 feet (ft), try to spend at least a night at a medium altitude before going higher. For example, in the [place], spend a night in [place] before going to the [facility]. · Do not fly into high-altitude cities. If this is not possible, avoid large meals, alcohol, and being very active after you arrive. Rest, and drink plenty of liquids. If you have symptoms, do not go higher until they have gone away. · Sleep at an altitude that is lower than the altitude you were at during the day.  For example, if you ski at 9,500 ft during the day, sleep the night before and the night after at 8,000 ft. 
 · One study showed that taking ibuprofen may help with altitude sickness. You may prevent altitude sickness if you take ibuprofen 6 hours before you climb to high elevations. It may help if you take it every 6 hours while climbing. Taking ibuprofen may also help you feel better if you do get sick. Read and follow all instructions on the label. · You may consider taking medicines such as acetazolamide (Diamox) or dexamethasone before traveling to high altitudes. These medicines may prevent or lessen symptoms. Talk to your doctor about this. · Eat a lot of carbohydrates. This can come from breads, grains, and pasta. At high altitudes, your body uses carbohydrates as fuel more easily than fats or proteins. How is altitude sickness treated? · Go to a lower altitude. This is the best treatment for altitude sickness. ¨ If you have mild symptoms, you may be able to stay at that altitude and let your body get used to it. ¨ Go down at least 1,500 ft if your symptoms are moderate to severe, they get worse, or treatment with medicine or oxygen does not help. ¨ Go to a lower altitude as fast as you can or get emergency help if someone with you has severe symptoms such as being confused or not being able to walk straight. Go with the person. Never let someone with severe altitude sickness go down alone. · If you stay at a high altitude, rest. Limit any walking or activity. Drink plenty of water, but do not drink alcohol. Do not go to a higher altitude until your symptoms go away. This may take from 12 hours to 3 or 4 days. · Your doctor may recommend dexamethasone to treat altitude sickness. Take it as directed. · Take an over-the-counter pain medicine, such as acetaminophen (Tylenol), ibuprofen (Advil, Motrin), or naproxen (Aleve). Read and follow all instructions on the label. · If available, you may also be able to use oxygen or a specially designed pressure chamber to treat altitude sickness. Follow-up care is a key part of your treatment and safety. Be sure to make and go to all appointments, and call your doctor if you are having problems. It's also a good idea to know your test results and keep a list of the medicines you take. Where can you learn more? Go to http://juliana-samy.info/. Enter H278 in the search box to learn more about \"Learning About Altitude Sickness. \" Current as of: May 7, 2017 Content Version: 11.7 © 2647-1721 [employer]. Care instructions adapted under license by [employer] (which disclaims liability or warranty for this information). If you have questions about a medical condition or this instruction, always ask your healthcare professional. [address] any warranty or liability for your use of this information. Introducing hospitals & HEALTH SERVICES! Dear Dennys Beckman: 
Thank you for requesting a [employer] account. Our records indicate that you already have an active [employer] account. You can access your account anytime at [email or web address] Did you know that you can access your hospital and ER discharge instructions at any time in [employer]? You can also review all of your test results from your hospital stay or ER visit. Additional Information If you have questions, please visit the Frequently Asked Questions section of the [employer] website at [email or web address] Remember, [employer] is NOT to be used for urgent needs. For medical emergencies, dial 911. Now available from your iPhone and Android! Please provide this summary of care documentation to your next provider. Your primary care clinician is listed as Sheree Glaser. If you have any questions after today's visit, please call [phone].

## 2018-08-27 NOTE — PROGRESS NOTES
He is traveling to [place], [place] and will staying several nights over 8000 ft altitude. Would like altitude sickness medication. Diagnoses and all orders for this visit:    1. Travel advice encounter  -     acetaZOLAMIDE (DIAMOX) 250 mg tablet; Take 1 Tab by mouth two (2) times a day. The patient was given written instructions detailing his diagnoses and recommendations made today at the visit. We discussed use of diamox for treatment of altitude related illness.

## 2018-08-27 NOTE — PATIENT INSTRUCTIONS
Learning About Altitude Sickness  What is altitude sickness? Altitude sickness occurs when you cannot get enough oxygen from the air at high altitudes. It happens most often when people who are not used to high altitudes go quickly from lower altitudes to 8,000 ft or higher. When you go too high too fast, your body cannot adjust.  Altitude sickness can range from mild to life-threatening. With good planning, such as ascending slowly or taking certain medicines, it is often preventable. What are the symptoms? The symptoms of altitude sickness may be mild to severe. Symptoms may include:  · A headache, which may get worse during the night and when you wake up. · Weakness. · Feeling sick to your stomach. You may vomit. · Feeling dizzy. · Being confused. · Breathing problems. When altitude sickness is severe, it may be deadly. Your symptoms may not start until a day after you have been at a high altitude. Many people say altitude sickness feels like having a hangover. How can you prevent it? You may be able to prevent altitude sickness by taking your time when you go to high altitudes and using medicine in advance. · If you go to altitudes higher than 8,000 feet (ft), try to spend at least a night at a medium altitude before going higher. For example, in the [place], spend a night in [facility] before going to the [facility]. · Do not fly into high-altitude cities. If this is not possible, avoid large meals, alcohol, and being very active after you arrive. Rest, and drink plenty of liquids. If you have symptoms, do not go higher until they have gone away. · Sleep at an altitude that is lower than the altitude you were at during the day. For example, if you ski at 9,500 ft during the day, sleep the night before and the night after at 8,000 ft. · One study showed that taking ibuprofen may help with altitude sickness.  You may prevent altitude sickness if you take ibuprofen 6 hours before you climb to high elevations. It may help if you take it every 6 hours while climbing. Taking ibuprofen may also help you feel better if you do get sick. Read and follow all instructions on the label. · You may consider taking medicines such as acetazolamide (Diamox) or dexamethasone before traveling to high altitudes. These medicines may prevent or lessen symptoms. Talk to your doctor about this. · Eat a lot of carbohydrates. This can come from breads, grains, and pasta. At high altitudes, your body uses carbohydrates as fuel more easily than fats or proteins. How is altitude sickness treated? · Go to a lower altitude. This is the best treatment for altitude sickness. ¨ If you have mild symptoms, you may be able to stay at that altitude and let your body get used to it. ¨ Go down at least 1,500 ft if your symptoms are moderate to severe, they get worse, or treatment with medicine or oxygen does not help. ¨ Go to a lower altitude as fast as you can or get emergency help if someone with you has severe symptoms such as being confused or not being able to walk straight. Go with the person. Never let someone with severe altitude sickness go down alone. · If you stay at a high altitude, rest. Limit any walking or activity. Drink plenty of water, but do not drink alcohol. Do not go to a higher altitude until your symptoms go away. This may take from 12 hours to 3 or 4 days. · Your doctor may recommend dexamethasone to treat altitude sickness. Take it as directed. · Take an over-the-counter pain medicine, such as acetaminophen (Tylenol), ibuprofen (Advil, Motrin), or naproxen (Aleve). Read and follow all instructions on the label. · If available, you may also be able to use oxygen or a specially designed pressure chamber to treat altitude sickness. Follow-up care is a key part of your treatment and safety. Be sure to make and go to all appointments, and call your doctor if you are having problems.  It's also a good idea to know your test results and keep a list of the medicines you take. Where can you learn more? Go to http://juliana-samy.info/. Enter H278 in the search box to learn more about \"Learning About Altitude Sickness. \"  Current as of: May 7, 2017  Content Version: 11.7  © 5838-9586 [employer], [employer]. Care instructions adapted under license by [employer] (which disclaims liability or warranty for this information). If you have questions about a medical condition or this instruction, always ask your healthcare professional. [address] any warranty or liability for your use of this information.

## 2018-09-21 ENCOUNTER — OFFICE VISIT (OUTPATIENT)
Dept: INTERNAL MEDICINE CLINIC | Age: 68
End: 2018-09-21

## 2018-09-21 VITALS
WEIGHT: 315 LBS | HEART RATE: 61 BPM | RESPIRATION RATE: 18 BRPM | OXYGEN SATURATION: 97 % | TEMPERATURE: 98.6 F | SYSTOLIC BLOOD PRESSURE: 105 MMHG | HEIGHT: 73 IN | DIASTOLIC BLOOD PRESSURE: 60 MMHG | BODY MASS INDEX: 41.75 KG/M2

## 2018-09-21 DIAGNOSIS — E66.01 OBESITY, MORBID (HCC): ICD-10-CM

## 2018-09-21 DIAGNOSIS — B37.2 CANDIDAL INTERTRIGO: ICD-10-CM

## 2018-09-21 DIAGNOSIS — I10 ESSENTIAL HYPERTENSION: Primary | Chronic | ICD-10-CM

## 2018-09-21 DIAGNOSIS — Z23 ENCOUNTER FOR IMMUNIZATION: ICD-10-CM

## 2018-09-21 RX ORDER — OLMESARTAN MEDOXOMIL 20 MG/1
10 TABLET ORAL DAILY
Qty: 1 TAB | Refills: 1
Start: 2018-09-21 | End: 2018-10-11 | Stop reason: CLARIF

## 2018-09-21 RX ORDER — KETOCONAZOLE 20 MG/G
CREAM TOPICAL 2 TIMES DAILY
Qty: 60 G | Refills: 3 | Status: SHIPPED | OUTPATIENT
Start: 2018-09-21 | End: 2018-12-20 | Stop reason: SDUPTHER

## 2018-09-21 NOTE — MR AVS SNAPSHOT
[facility] [place] 
 
 
 [address] [place] [address] 
[phone] Patient: Debbie Giron 
MRN: [id number] Visit Information Date & Time Provider Department Dept. Phone Encounter #  
 9/21/2018  1:15 PM Dl Maldonado MD [facility] [address] [facility] [id number] Follow-up Instructions Return in about 3 months (around 12/21/2018). Your Appointments 11/20/2018 11:00 AM  
Office Visit with PABLITO Salter [address] [facility]) Appt Note: est. pt. 4 month skin exam  
 [facility] [place] [facility] [address] [address] Upcoming Health Maintenance Date Due  
 GLAUCOMA SCREENING Q2Y 10/23/2017 Pneumococcal 65+ High/Highest Risk (2 of 2 - PPSV23) 6/19/2018 MEDICARE YEARLY EXAM 3/14/2019 COLONOSCOPY 5/29/2023 DTaP/Tdap/Td series (2 - Td) 11/24/2024 Allergies as of 9/21/2018  Review Complete On: 8/27/2018 By: Panda Ortiz LPN No Known Allergies Current Immunizations  Reviewed on 9/21/2018 Name Date Influenza High Dose Vaccine PF 11/15/2017, 10/5/2016 Influenza Vaccine 11/24/2015, 10/1/2015 Influenza Vaccine (Tri) Adjuvanted 9/21/2018 Pneumococcal Conjugate (PCV-13) 4/24/2018 Tdap 11/24/2014 Zoster Recombinant 4/24/2018 Zoster Vaccine, Live 1/21/2016 Reviewed by Jessica Rojas LPN on 2/89/3178 at  1:32 PM  
You Were Diagnosed With   
  
 Codes Comments Essential hypertension    -  Primary ICD-10-CM: I10 
ICD-9-CM: 401.9 Encounter for immunization     ICD-10-CM: T51 ICD-9-CM: V03.89 Tinea cruris     ICD-10-CM: B35.6 ICD-9-CM: 110.3 Vitals BP Pulse Temp Resp Height(growth percentile) Weight(growth percentile) 105/60 61 98.6 °F (37 °C) (Oral) 18 6' 1\" (1.854 m) 329 lb (149.2 kg) SpO2 BMI Smoking Status 97% 43.41 kg/m2 Never Smoker Vitals History BMI and BSA Data Body Mass Index Body Surface Area  
 43.41 kg/m 2 2.77 m 2 Preferred Pharmacy Pharmacy Name Phone [id number] [address] [phone] Your Updated Medication List  
  
   
This list is accurate as of 9/21/18  1:44 PM.  Always use your most recent med list.  
  
  
  
  
 allopurinol 300 mg tablet Commonly known as:  [place] Tariq Take 1 Tab by mouth daily. atorvastatin 10 mg tablet Commonly known as:  LIPITOR Take 1 Tab by mouth every evening. calcium 500 mg Tab Take  by mouth. With potassium  
  
 cholecalciferol (vitamin D3) 2,000 unit Tab Take  by mouth. furosemide 20 mg tablet Commonly known as:  LASIX Take 1 Tab by mouth daily. indomethacin SR 75 mg SR capsule Commonly known as:  INDOCIN SR Take 75 mg by mouth daily as needed. ketoconazole 2 % topical cream  
Commonly known as:  NIZORAL Apply  to affected area two (2) times a day. magnesium 250 mg Tab Take 400 mg by mouth daily. olmesartan 20 mg tablet Commonly known as:  [employer]ands Take 0.5 Tabs by mouth daily. Omega-3 Fatty Acids 300 mg Cap Take  by mouth.  
  
 triamcinolone acetonide 0.1 % topical cream  
Commonly known as:  KENALOG Apply  to affected area two (2) times a day. use thin layer  
  
 verapamil  mg CR tablet Commonly known as:  CALAN-SR  
TAKE 1 TABLET BY MOUTH TWICE A DAY Prescriptions Sent to Pharmacy Refills  
 ketoconazole (NIZORAL) 2 % topical cream 3 Sig: Apply  to affected area two (2) times a day. Class: Normal  
 Pharmacy: [facility]/pharmacy [address], [address] [place] [address] Ph #: [phone] Route: Topical  
  
We Performed the Following INFLUENZA VACCINE INACTIVATED (IIV), SUBUNIT, ADJUVANTED, IM [id number] CPT(R)] Follow-up Instructions Return in about 3 months (around 12/21/2018). Introducing [facility] & HEALTH SERVICES! Dear Kylee Brewer: 
Thank you for requesting a [employer] account. Our records indicate that you already have an active [employer] account. You can access your account anytime at [email or web address] Did you know that you can access your hospital and ER discharge instructions at any time in [employer]? You can also review all of your test results from your hospital stay or ER visit. Additional Information If you have questions, please visit the Frequently Asked Questions section of the [employer] website at [email or web address] Remember, [employer] is NOT to be used for urgent needs. For medical emergencies, dial 911. Now available from your iPhone and Android! Please provide this summary of care documentation to your next provider. Your primary care clinician is listed as Mace Presser. If you have any questions after today's visit, please call [phone].

## 2018-09-21 NOTE — PROGRESS NOTES
HISTORY OF PRESENT ILLNESS  Kevin Powell is a 74 y.o. male. HPI  Was traveling out [address] at over 7000[place] on hills and felt winded at times. Poor stamina. He is concerned about his weight and deconditioning. Rash:  he presents with rash/ skin problem located on groin . Onset of skin problem was several years ago. Worsened over last last 2-3 weeks. Itching: no. Flaking or scaling:no. Pain: no but irritated. Weeping/ draining:  yes. Exposures: no  Medications tried include:  powder and was not effective. he does have a history of jock itch    Hypertension:  Kevin Powell is a 74 y.o. male with hypertension. without Chronic kidney disease stage    Medication change since last visit: Yes: Comment: changed to benicar  The patient reports:  taking medications as instructed, no medication side effects noted, home BP monitoring in range of 443'Q systolic over 81'U diastolic, no chest pain on exertion, no dyspnea on exertion, no swelling of ankles, no orthostatic dizziness or lightheadedness, no palpitations. Lifestyle modification/social history: generally follows a low fat low cholesterol diet, generally follows a low sodium diet, sedentary, nonsmoker    Lab Results   Component Value Date/Time    Sodium 139 07/18/2018 09:17 AM    Potassium 4.3 07/18/2018 09:17 AM    Chloride 103 07/18/2018 09:17 AM    CO2 28 07/18/2018 09:17 AM    Anion gap 8 07/18/2018 09:17 AM    Glucose 114 (H) 07/18/2018 09:17 AM    BUN 14 07/18/2018 09:17 AM    Creatinine 1.14 07/18/2018 09:17 AM    BUN/Creatinine ratio 12 07/18/2018 09:17 AM    GFR est AA >60 07/18/2018 09:17 AM    GFR est non-AA >60 07/18/2018 09:17 AM    Calcium 8.7 07/18/2018 09:17 AM             ROS    Physical Exam   Constitutional: He appears well-developed and well-nourished. No distress.    BP (!) 89/60 (BP 1 Location: Left arm, BP Patient Position: Sitting)  Pulse 61  Temp 98.6 °F (37 °C) (Oral)   Resp 18  Ht 6' 1\" (1.854 m)  Wt 329 lb (149.2 kg)  SpO2 97%  BMI 43.41 kg/m2Body mass index is 43.41 kg/(m^2). HENT:   Mouth/Throat: Oropharynx is clear and moist.   Neck: No JVD present. Carotid bruit is not present. Cardiovascular: Normal rate, regular rhythm and intact distal pulses. Murmur heard. Systolic murmur is present with a grade of 2/6   Pulmonary/Chest: Effort normal and breath sounds normal.   Musculoskeletal: He exhibits no edema. Neurological: He is alert. Skin: Skin is warm and dry. He is not diaphoretic. Moderate moist erythema under pannus in groin area and upper thighs bilaterally. Some scrotal and penile involvement   Nursing note and vitals reviewed. Repeat vitals were done by me and were:  Visit Vitals    /60    Pulse 61    Temp 98.6 °F (37 °C) (Oral)    Resp 18    Ht 6' 1\" (1.854 m)    Wt 329 lb (149.2 kg)    SpO2 97%    BMI 43.41 kg/m2       ASSESSMENT and PLAN  Diagnoses and all orders for this visit:    1. Essential hypertension - overcontrolled now. Decrease benicar to 10mg/ d. Log blood pressures at home while sitting, relaxed 3-5 times weekly and bring to next visit. Pt educated on goal BP of 130/80 on average or lower. Call office as soon as possible if BP's over 140/90 or below 110/50 on multiple occasions and/or with symptoms of dizziness, chest pain, shortness of breath, headache or ankle swelling. Recheck log and bp here in 3 month(s). -     olmesartan (BENICAR) 20 mg tablet; Take 0.5 Tabs by mouth daily. 2. Encounter for immunization  -     INFLUENZA VACCINE INACTIVATED (IIV), SUBUNIT, ADJUVANTED, IM    3. Candidal intertrigo  -     ketoconazole (NIZORAL) 2 % topical cream; Apply  to affected area two (2) times a day. 4. Obesity, morbid ([address] - we discussed diet and exercise for wt loss strategies. Follow-up Disposition:  Return in about 3 months (around 12/21/2018).

## 2018-09-25 DIAGNOSIS — E78.00 PURE HYPERCHOLESTEROLEMIA: Chronic | ICD-10-CM

## 2018-09-25 RX ORDER — ATORVASTATIN CALCIUM 10 MG/1
TABLET, FILM COATED ORAL
Qty: 90 TAB | Refills: 1 | Status: SHIPPED | OUTPATIENT
Start: 2018-09-25 | End: 2018-12-20 | Stop reason: SDUPTHER

## 2018-10-11 DIAGNOSIS — I10 ESSENTIAL HYPERTENSION: Chronic | ICD-10-CM

## 2018-10-11 RX ORDER — VALSARTAN 160 MG/1
TABLET ORAL
Qty: 90 TAB | Refills: 1 | Status: SHIPPED | OUTPATIENT
Start: 2018-10-11 | End: 2018-12-20 | Stop reason: ALTCHOICE

## 2018-10-21 DIAGNOSIS — I10 ESSENTIAL HYPERTENSION: Chronic | ICD-10-CM

## 2018-10-22 RX ORDER — VERAPAMIL HYDROCHLORIDE 240 MG/1
TABLET, FILM COATED, EXTENDED RELEASE ORAL
Qty: 180 TAB | Refills: 1 | Status: SHIPPED | OUTPATIENT
Start: 2018-10-22 | End: 2019-04-21 | Stop reason: SDUPTHER

## 2018-11-20 ENCOUNTER — OFFICE VISIT (OUTPATIENT)
Dept: DERMATOLOGY | Facility: AMBULATORY SURGERY CENTER | Age: 68
End: 2018-11-20

## 2018-11-20 VITALS
HEIGHT: 73 IN | HEART RATE: 66 BPM | DIASTOLIC BLOOD PRESSURE: 68 MMHG | BODY MASS INDEX: 41.75 KG/M2 | SYSTOLIC BLOOD PRESSURE: 130 MMHG | RESPIRATION RATE: 16 BRPM | WEIGHT: 315 LBS | TEMPERATURE: 98.2 F | OXYGEN SATURATION: 95 %

## 2018-11-20 DIAGNOSIS — L81.4 LENTIGINES: ICD-10-CM

## 2018-11-20 DIAGNOSIS — Z85.828 HISTORY OF NONMELANOMA SKIN CANCER: ICD-10-CM

## 2018-11-20 DIAGNOSIS — L91.8 CUTANEOUS SKIN TAGS: ICD-10-CM

## 2018-11-20 DIAGNOSIS — L82.1 SEBORRHEIC KERATOSES: ICD-10-CM

## 2018-11-20 DIAGNOSIS — Z85.820 PERSONAL HISTORY OF MALIGNANT MELANOMA OF SKIN: ICD-10-CM

## 2018-11-20 DIAGNOSIS — D22.9 MULTIPLE BENIGN NEVI: Primary | ICD-10-CM

## 2018-11-20 DIAGNOSIS — D18.01 CHERRY ANGIOMA: ICD-10-CM

## 2018-11-20 NOTE — PATIENT INSTRUCTIONS
Chief Complaint   Patient presents with    Skin Exam     4 month skin exam.  Areas of concern: None     Self Skin Exam and Sunscreens    Early detection and treatment is essential in the treatment of all forms of skin cancer. If caught early, all forms of skin cancer are curable. In addition to your regular visits, you should perform a monthly skin examination. Over time, you become familiar with what is normally found on your skin and can identify new or suspicious spots. One of the screening tools you can use to assess your skin is to follow the ABCDEs:    A= Asymmetry (One half is unlike the other half)     B= Border (An irregular, scalloped or poorly defined edge)    C= Color (Is varied from one area to another, has shades of tan, brown/ black,       white, red or blue)    D= Diameter (Spots larger than 6mm or a pencil eraser)    E= Evolving (New spots or one that is changing in size, shape, or color)    A follow- up interval will be customized based on your history of skin cancer or level of skin damage and risk factors. In any case, if you notice a suspicious or new spot, an appointment should be arranged between regular visits. Everyone should use sunscreen and sun-safe practices, which is especially important for those with a personal or family history of skin cancer. Suggestions for this include:    1. Use daily moisturizers containing SPF 30 or higher. 2. Wear long sleeve clothing with UPF ratings and a broad-brimmed hat. 3. Apply sunscreen with SPF 30 or higher to all sun exposed areas if you are going to be in the sun. A broad spectrum UVA/ UVB sunscreen is best.  Dont forget to REAPPLY every two hours or more often if swimming or sweating! 4. Avoid outside activities during peak sun hours, especially in the summer (10am- 2pm). 5. DO NOT use tanning beds.     Using sunscreen and sun-safe practices can help reduce the likelihood of developing skin cancer or additional skin cancers in those previously diagnosed.

## 2018-11-20 NOTE — PROGRESS NOTES
Written by Justice Chávez, as dictated by Valeriy Stanley, [id number]. Name: Naima Cardenas       Age: 79 y.o. Date: 11/20/2018    Chief Complaint:   Chief Complaint   Patient presents with    Skin Exam     4 month skin exam.  Areas of concern: None       Subjective:    HPI  Mr. Naima Cardenas is a 79 y.o. male who presents for a full skin exam.  The patient's last skin exam was on 07/24/18 and the patient does not have current complaints related to his skin. He is feeling well and in his usual state of health today. He has no current illnesses, no other skin concerns. His allergies, medications, medical, and social history are reviewed by me today. The patient's pertinent skin history includes : stage 1a malignant melanoma of the left posterior shoulder (depth 0.3mm) tx 2015, BCC left posterior shoulder, scalp, left popliteal area, Most recent BCC on the left cheek tx with excision 07/2018    ROS: Constitutional: Negative. Dermatological : negative    Social History     Socioeconomic History    Marital status:      Spouse name: Not on file    Number of children: Not on file    Years of education: Not on file    Highest education level: Not on file   Social Needs    Financial resource strain: Not on file    Food insecurity - worry: Not on file    Food insecurity - inability: Not on file    Transportation needs - medical: Not on file   [employer] needs - non-medical: Not on file   Occupational History    Not on file   Tobacco Use    Smoking status: Never Smoker    Smokeless tobacco: Never Used   Substance and Sexual Activity    Alcohol use:  Yes     Alcohol/week: 0.6 oz     Types: 1 Cans of beer per week    Drug use: No    Sexual activity: Not Currently     Partners: Female   Other Topics Concern    Not on file   Social History Narrative    Not on file       Family History   Problem Relation Age of Onset    Diabetes Mother     Heart Disease Mother         heart valve replaced    Hypertension Father     Colon Polyps Father     Cancer Brother         Stomach        Past Medical History:   Diagnosis Date    Cancer ([facility] [address]     melanoma     Gout     Hypercholesterolemia     Hypertension     Skin cancer     melanoma (left post shoulder) and BCC x 2 (scalp, right popliteal)    Sun-damaged skin     Thyroid disease        Past Surgical History:   Procedure Laterality Date    HX HEENT Left     partial thyroidectomy    HX ORTHOPAEDIC  1969    Left knee ACL     HX ORTHOPAEDIC  1979    dupytrens contractures    HX THYROIDECTOMY  05/2013    HX TONSILLECTOMY  1950    SKIN TISSUE PROCEDURE UNLISTED         Current Outpatient Medications   Medication Sig Dispense Refill    verapamil ER (CALAN-SR) 240 mg CR tablet TAKE 1 TABLET BY MOUTH TWICE A  Tab 1    valsartan (DIOVAN) 160 mg tablet TAKE 1 TAB BY MOUTH DAILY. 90 Tab 1    atorvastatin (LIPITOR) 10 mg tablet TAKE 1 TAB BY MOUTH EVERY EVENING. 90 Tab 1    furosemide (LASIX) 20 mg tablet Take 1 Tab by mouth daily. 90 Tab 1    allopurinol (ZYLOPRIM) 300 mg tablet Take 1 Tab by mouth daily. 90 Tab 1    cholecalciferol, vitamin D3, 2,000 unit tab Take  by mouth.  Omega-3 Fatty Acids 300 mg cap Take  by mouth.  calcium 500 mg tab Take  by mouth. With potassium      magnesium 250 mg tab Take 400 mg by mouth daily.  indomethacin SR (INDOCIN SR) 75 mg SR capsule Take 75 mg by mouth daily as needed.  ketoconazole (NIZORAL) 2 % topical cream Apply  to affected area two (2) times a day. 60 g 3    triamcinolone acetonide (KENALOG) 0.1 % topical cream Apply  to affected area two (2) times a day. use thin layer 45 g 3       No Known Allergies      Objective:    Visit Vitals  /68   Pulse 66   Temp 98.2 °F (36.8 °C)   Resp 16   Ht 6' 1\" (1.854 m)   Wt 330 lb (149.7 kg)   SpO2 95%   BMI 43.54 kg/m²       Jacky Franco is a 79 y.o. male who appears well and in no distress.   He is awake, alert, and oriented. There is no preauricular, submandibular, or cervical lymphadenopathy. A skin examination was performed including his scalp, face (including eyelids), ears, neck, chest, back, abdomen, upper extremities (including digits/nails), lower extremities, breasts, buttocks; genital skin was not examined. He has well-healed scars on his left posterior shoulder, scalp, left popliteal area, and left cheek without evidence of lesion recurrence. He has a well-healed melanoma scar without pigment, nodularity, or other evidence of lesion recurrence. No associated left axillary lymphadenopathy. He has scattered waxy macules and keratotic papules consistent with seborrheic keratoses. He has pink intradermal nevi and brown junctional nevi, no concerning features for severe atypia. He has scattered red papules consistent with cherry angiomas. There are lentigines on sun exposed areas. He has multiple skin tags on his axillas. Assessment/Plan:    1. Personal history of melanoma and nonmelanoma skin cancer. I discussed sun protection, sunscreen use, the warning signs of skin cancer, the need for self-skin examinations, and the need for regular practitioner exams every 4 months. The patient should follow up sooner as needed if new, changing, or symptomatic skin lesions arise. 2. Seborrheic keratoses. The diagnosis was reviewed and the patient was reassured that no treatment is needed for these benign lesions. 3. Normal nevi. The diagnosis of normal nevi was reviewed. I discussed sun protection, sunscreen use, the warning signs of skin cancer, the need for self-skin examinations, and the need for regular practitioner exams every 4 months. The patient should follow up sooner as needed if new, changing, or symptomatic skin lesions arise. 4. Cherry angiomas. The diagnosis was reviewed and the patient was reassured that no treatment is needed for these benign lesions. 5. Solar lentigos.   The diagnosis and relationship to sun exposure was reviewed. Sun protection advised. 6. Skin tags, axillas. The diagnosis was discussed. The patient was reassured that no treatment is necessary at this time. This plan was reviewed with the patient and patient agrees. All questions were answered. This scribe documentation was reviewed by me and accurately reflects the examination and decisions made by me.

## 2018-12-20 ENCOUNTER — TELEPHONE (OUTPATIENT)
Dept: INTERNAL MEDICINE CLINIC | Age: 68
End: 2018-12-20

## 2018-12-20 ENCOUNTER — OFFICE VISIT (OUTPATIENT)
Dept: INTERNAL MEDICINE CLINIC | Age: 68
End: 2018-12-20

## 2018-12-20 VITALS
HEART RATE: 66 BPM | HEIGHT: 73 IN | OXYGEN SATURATION: 94 % | SYSTOLIC BLOOD PRESSURE: 111 MMHG | WEIGHT: 315 LBS | TEMPERATURE: 98.6 F | DIASTOLIC BLOOD PRESSURE: 71 MMHG | RESPIRATION RATE: 18 BRPM | BODY MASS INDEX: 41.75 KG/M2

## 2018-12-20 DIAGNOSIS — B37.2 CANDIDAL INTERTRIGO: ICD-10-CM

## 2018-12-20 DIAGNOSIS — I10 ESSENTIAL HYPERTENSION: Primary | ICD-10-CM

## 2018-12-20 DIAGNOSIS — G31.84 MCI (MILD COGNITIVE IMPAIRMENT): ICD-10-CM

## 2018-12-20 RX ORDER — OLMESARTAN MEDOXOMIL 20 MG/1
20 TABLET ORAL DAILY
COMMUNITY
Start: 2018-11-03 | End: 2018-12-20 | Stop reason: ALTCHOICE

## 2018-12-20 RX ORDER — VALSARTAN 160 MG/1
160 TABLET ORAL DAILY
Qty: 90 TAB | Refills: 1 | Status: SHIPPED | OUTPATIENT
Start: 2018-12-20 | End: 2019-05-22 | Stop reason: SDUPTHER

## 2018-12-20 RX ORDER — KETOCONAZOLE 20 MG/G
CREAM TOPICAL 2 TIMES DAILY
Qty: 60 G | Refills: 3 | Status: SHIPPED | OUTPATIENT
Start: 2018-12-20 | End: 2019-03-13 | Stop reason: SDUPTHER

## 2018-12-20 NOTE — PROGRESS NOTES
HISTORY OF PRESENT ILLNESS  Janette Henao is a 79 y.o. male. HPI  Hypertension:  Janette Henao is a 79 y.o. male with hypertension. without Chronic kidney disease stage    Medication change since last visit: Yes: Comment: changed to benicar  The patient reports:  taking medications as instructed, no medication side effects noted, home BP monitoring in range of 658-665'F systolic over 94'V diastolic, no TIA's, no chest pain on exertion, no dyspnea on exertion, no swelling of ankles, no orthostatic dizziness or lightheadedness, no palpitations. Lifestyle modification/social history: generally follows a low fat low cholesterol diet, nonsmoker    Lab Results   Component Value Date/Time    Sodium 139 07/18/2018 09:17 AM    Potassium 4.3 07/18/2018 09:17 AM    Chloride 103 07/18/2018 09:17 AM    CO2 28 07/18/2018 09:17 AM    Anion gap 8 07/18/2018 09:17 AM    Glucose 114 (H) 07/18/2018 09:17 AM    BUN 14 07/18/2018 09:17 AM    Creatinine 1.14 07/18/2018 09:17 AM    BUN/Creatinine ratio 12 07/18/2018 09:17 AM    GFR est AA >60 07/18/2018 09:17 AM    GFR est non-AA >60 07/18/2018 09:17 AM    Calcium 8.7 07/18/2018 09:17 AM     He's having ongoing prostate symptoms - increased frequency, nocturia - 4-5/ night /  He's on lasix still . He reports gradual increase in recall problems over time. No processing problems. Onset over years. He is asking about next steps. ROS    Physical Exam  Visit Vitals  /71   Pulse 66   Temp 98.6 °F (37 °C) (Oral)   Resp 18   Ht 6' 1\" (1.854 m)   Wt 347 lb 2 oz (157.5 kg)   SpO2 94%   BMI 45.80 kg/m²       ASSESSMENT and PLAN  Diagnoses and all orders for this visit:    1. Essential hypertension - Well controlled and stable. his medications were reviewed and refilled where necessary as noted below. Labs ordered as noted. 2. Candidal intertrigo  -     ketoconazole (NIZORAL) 2 % topical cream; Apply  to affected area two (2) times a day.     3. MCI (mild cognitive impairment) - we discussed next steps for neuropsych testing formally and further workup if needed. He will discuss plan with next visit and consider workup. Follow-up Disposition:  Return in about 4 months (around 4/20/2019). Discussed the patient's BMI with him. The BMI follow up plan is as follows:     dietary management education, guidance, and counseling  encourage exercise  monitor weight  prescribed dietary intake    An After Visit Summary was printed and given to the patient.

## 2018-12-20 NOTE — TELEPHONE ENCOUNTER
Please have pt confirm with pharmacy if his valsartan was recalled or not. If not, can continue valsartan. If so, will need to change bp medication.

## 2018-12-20 NOTE — TELEPHONE ENCOUNTER
Pt stated PCP wanted him to discontinue Olmesartan 20 mg because it's too strong. Pt stated Valsartan 160 mg was recalled back in July 14th, pt stated he will throw this out. Pt also stated Valsartan 160 mg was sent in for him back in October 11th and he is still taking this without any problem, pt said this is working just fine. He wants to know should he continue taking the Valsartan 160 mg and if Yes, please refill to his mail order pharmacy.

## 2018-12-20 NOTE — PATIENT INSTRUCTIONS
Body Mass Index: Care Instructions  Your Care Instructions    Body mass index (BMI) can help you see if your weight is raising your risk for health problems. It uses a formula to compare how much you weigh with how tall you are. · A BMI lower than 18.5 is considered underweight. · A BMI between 18.5 and 24.9 is considered healthy. · A BMI between 25 and 29.9 is considered overweight. A BMI of 30 or higher is considered obese. If your BMI is in the normal range, it means that you have a lower risk for weight-related health problems. If your BMI is in the overweight or obese range, you may be at increased risk for weight-related health problems, such as high blood pressure, heart disease, stroke, arthritis or joint pain, and diabetes. If your BMI is in the underweight range, you may be at increased risk for health problems such as fatigue, lower protection (immunity) against illness, muscle loss, bone loss, hair loss, and hormone problems. BMI is just one measure of your risk for weight-related health problems. You may be at higher risk for health problems if you are not active, you eat an unhealthy diet, or you drink too much alcohol or use tobacco products. Follow-up care is a key part of your treatment and safety. Be sure to make and go to all appointments, and call your doctor if you are having problems. It's also a good idea to know your test results and keep a list of the medicines you take. How can you care for yourself at home? · Practice healthy eating habits. This includes eating plenty of fruits, vegetables, whole grains, lean protein, and low-fat dairy. · If your doctor recommends it, get more exercise. Walking is a good choice. Bit by bit, increase the amount you walk every day. Try for at least 30 minutes on most days of the week. · Do not smoke. Smoking can increase your risk for health problems. If you need help quitting, talk to your doctor about stop-smoking programs and medicines. These can increase your chances of quitting for good. · Limit alcohol to 2 drinks a day for men and 1 drink a day for women. Too much alcohol can cause health problems. If you have a BMI higher than 25  · Your doctor may do other tests to check your risk for weight-related health problems. This may include measuring the distance around your waist. A waist measurement of more than 40 inches in men or 35 inches in women can increase the risk of weight-related health problems. · Talk with your doctor about steps you can take to stay healthy or improve your health. You may need to make lifestyle changes to lose weight and stay healthy, such as changing your diet and getting regular exercise. If you have a BMI lower than 18.5  · Your doctor may do other tests to check your risk for health problems. · Talk with your doctor about steps you can take to stay healthy or improve your health. You may need to make lifestyle changes to gain or maintain weight and stay healthy, such as getting more healthy foods in your diet and doing exercises to build muscle. Where can you learn more? Go to http://juliana-samy.info/. Enter S176 in the search box to learn more about \"Body Mass Index: Care Instructions. \"  Current as of: October 13, 2016  Content Version: 11.4  © 2783-6017 [employer]. Care instructions adapted under license by [employer] (which disclaims liability or warranty for this information). If you have questions about a medical condition or this instruction, always ask your healthcare professional. [address] any warranty or liability for your use of this information.

## 2018-12-20 NOTE — TELEPHONE ENCOUNTER
Patient is requesting to speak with the nurse, left no further details , he can be reached at [phone]

## 2019-01-09 ENCOUNTER — TELEPHONE (OUTPATIENT)
Dept: INTERNAL MEDICINE CLINIC | Age: 69
End: 2019-01-09

## 2019-01-09 ENCOUNTER — OFFICE VISIT (OUTPATIENT)
Dept: INTERNAL MEDICINE CLINIC | Age: 69
End: 2019-01-09

## 2019-01-09 DIAGNOSIS — S04.32XA INJURY OF LEFT TRIGEMINAL NERVE, INITIAL ENCOUNTER: Primary | ICD-10-CM

## 2019-01-09 DIAGNOSIS — H10.32 ACUTE BACTERIAL CONJUNCTIVITIS OF LEFT EYE: ICD-10-CM

## 2019-01-09 RX ORDER — CIPROFLOXACIN HYDROCHLORIDE 3.5 MG/ML
2 SOLUTION/ DROPS TOPICAL
Qty: 5 ML | Refills: 0 | Status: SHIPPED | OUTPATIENT
Start: 2019-01-09 | End: 2019-01-09 | Stop reason: ALTCHOICE

## 2019-01-09 RX ORDER — VALACYCLOVIR HYDROCHLORIDE 1 G/1
1000 TABLET, FILM COATED ORAL 3 TIMES DAILY
Qty: 21 TAB | Refills: 0 | Status: SHIPPED | OUTPATIENT
Start: 2019-01-09 | End: 2019-05-20 | Stop reason: ALTCHOICE

## 2019-01-09 RX ORDER — OFLOXACIN 3 MG/ML
3 SOLUTION/ DROPS OPHTHALMIC
Qty: 10 ML | Refills: 0 | Status: SHIPPED | OUTPATIENT
Start: 2019-01-09 | End: 2019-01-11 | Stop reason: ALTCHOICE

## 2019-01-09 RX ORDER — PREDNISONE 20 MG/1
60 TABLET ORAL
Qty: 21 TAB | Refills: 0 | Status: SHIPPED | OUTPATIENT
Start: 2019-01-09 | End: 2019-05-20 | Stop reason: ALTCHOICE

## 2019-01-09 NOTE — PROGRESS NOTES
Chief Complaint   Patient presents with    Eye Drainage     left eye           Conjunctivitis  Patient presents for presents evaluation of redness, foreign body sensation. He has noticed the above symptoms in the left eye for 6 days. Onset was sudden. Symptoms have included tearing, discharge, itching. Patient denies pain, blurred vision, visual field deficit, photophobia. There is a history of use of water pack aggressively. He does not have any sinus pain or pressure above his eyes or around his nose or under his eyes. Trigeminal nerve irritation  Patient reports that prior to his eye irritation he was using a WaterPik pretty aggressively on level 5-6 and usually uses 2. Patient reports he had food stuck in his tooth and gums and went after his gums pretty aggressively. He reports the next day he had the conjunctivitis and also noticed that he had some decreased ability to smile on his left side of his face. He denied any altered mental status, speech difficulties or any other neurological symptoms. He reports his symptoms are actually improving. He denies rash. He denies pain of his eye or of his left mouth or gums.                 Past Medical History:   Diagnosis Date    Cancer ([facility] [address]     melanoma     Gout     Hypercholesterolemia     Hypertension     Skin cancer     melanoma (left post shoulder) and BCC x 2 (scalp, right popliteal)    Sun-damaged skin     Thyroid disease      Past Surgical History:   Procedure Laterality Date    COLONOSCOPY N/A 5/29/2018    COLONOSCOPY performed by Adán Gilbert MD at [address] [facility] HX HEENT Left     partial thyroidectomy    HX ORTHOPAEDIC  1969    Left knee ACL     HX ORTHOPAEDIC  1979    dupytrens contractures    HX THYROIDECTOMY  05/2013    HX TONSILLECTOMY  1950    SKIN TISSUE PROCEDURE UNLISTED       Social History     Socioeconomic History    Marital status:      Spouse name: Not on file    Number of children: Not on file    Years of education: Not on file    Highest education level: Not on file   Tobacco Use    Smoking status: Never Smoker    Smokeless tobacco: Never Used   Substance and Sexual Activity    Alcohol use: Yes     Alcohol/week: 0.6 oz     Types: 1 Cans of beer per week    Drug use: No    Sexual activity: Not Currently     Partners: Female     Family History   Problem Relation Age of Onset    Diabetes Mother     Heart Disease Mother         heart valve replaced    Hypertension Father     Colon Polyps Father     Cancer Brother         Stomach      Current Outpatient Medications   Medication Sig Dispense Refill    ketoconazole (NIZORAL) 2 % topical cream Apply  to affected area two (2) times a day. 60 g 3    atorvastatin (LIPITOR) 10 mg tablet Take 1 Tab by mouth daily. 90 Tab 1    valsartan (DIOVAN) 160 mg tablet Take 1 Tab by mouth daily. 90 Tab 1    verapamil ER (CALAN-SR) 240 mg CR tablet TAKE 1 TABLET BY MOUTH TWICE A  Tab 1    allopurinol (ZYLOPRIM) 300 mg tablet Take 1 Tab by mouth daily. 90 Tab 1    triamcinolone acetonide (KENALOG) 0.1 % topical cream Apply  to affected area two (2) times a day. use thin layer 45 g 3    cholecalciferol, vitamin D3, 2,000 unit tab Take  by mouth.  Omega-3 Fatty Acids 300 mg cap Take  by mouth.  calcium 500 mg tab Take  by mouth. With potassium      magnesium 250 mg tab Take 400 mg by mouth daily.  indomethacin SR (INDOCIN SR) 75 mg SR capsule Take 75 mg by mouth daily as needed. No Known Allergies    Review of Systems - General ROS: negative for - chills, fatigue, fever, hot flashes or malaise  Cardiovascular ROS: no chest pain or dyspnea on exertion  Respiratory ROS: no cough, shortness of breath, or wheezing    There were no vitals taken for this visit. General Appearance:  Well developed, well nourished,alert and oriented x 3, and individual in no acute distress.    Ears/Nose/Mouth/Throat:   Hearing grossly normal.         Neck: Supple, no lad, no bruits   Chest:   Lungs clear to auscultation bilaterally. Cardiovascular:  Regular rate and rhythm, S1, S2 normal, no murmur. Abdomen:   Soft, non-tender, bowel sounds are active. Extremities: No edema bilaterally. Skin: Warm and dry, no suspicious lesions                 Diagnoses and all orders for this visit:    1. Injury of left trigeminal nerve, initial encounter  -     predniSONE (DELTASONE) 20 mg tablet; Take 60 mg by mouth daily (with breakfast). -     valACYclovir (VALTREX) 1 gram tablet; Take 1 Tab by mouth three (3) times daily. 2. Acute bacterial conjunctivitis of left eye  -     ofloxacin (FLOXIN) 0.3 % ophthalmic solution; Administer 3 Drops to left eye every two (2) hours. Other orders    I spent 25 minutes with this patient and greater than 50% of the time was spent in management and counseling with regards to his conjunctivitis as well as possible trigeminal nerve injury versus Bell's palsy. History more suggestive of injury of the trigeminal nerve. Patient will follow up with his dentist for further wound evaluation. Patient also has an ophthalmologist and if pain, increased redness or vision changes he will see ophthalmology.

## 2019-01-09 NOTE — TELEPHONE ENCOUNTER
Patient states the brand of eye drops prescribed is on back order, requesting a new script be sent to his pharmacy.

## 2019-01-09 NOTE — PATIENT INSTRUCTIONS
Bell's Palsy: Care Instructions  Your Care Instructions    Bell's palsy is paralysis or weakness of the muscles on one side of the face. Often people with Bell's palsy have a droop on one side of the mouth and have trouble completely shutting the eye on the same side. Bell's palsy can also interfere with the sense of taste. These things happen when a nerve in your face becomes inflamed. Bell's palsy is not caused by a stroke. The cause of the nerve inflammation is not known. But some experts think that a virus may cause it. Because of this, doctors sometimes prescribe antiviral medicine to treat it. You also may get medicine to reduce swelling. Bell's palsy usually gets better on its own in a few weeks or months. Follow-up care is a key part of your treatment and safety. Be sure to make and go to all appointments, and call your doctor if you are having problems. It's also a good idea to know your test results and keep a list of the medicines you take. How can you care for yourself at home? · Take your medicines exactly as prescribed. Call your doctor if you think you are having a problem with your medicine. You will get more details on the specific medicines your doctor prescribes. · Use artificial tears or ointment if your eyes are too dry. Bell's palsy can make your lower eyelid droop, causing a dry eye. · If you cannot completely close your eye, consider using an eye patch while you sleep. · Help yourself blink by using your finger to close and open your eyelid. This may help keep your eye moist.  · Wear glasses or goggles to keep dust and dirt out of your eye. · As feeling comes back to your face, massage your forehead, cheeks, and lips. Massage may make the muscles in your face stronger. · Brush and floss your teeth often to help prevent tooth decay. Bell's palsy can dry up the spit on one side of your mouth. This increases the risk of tooth decay. When should you call for help?   Call 911 anytime you think you may need emergency care. For example, call if:    · You have symptoms of a stroke. These may include:  ? Sudden numbness, tingling, weakness, or loss of movement in your face, arm, or leg, especially on only one side of your body. ? Sudden vision changes. ? Sudden trouble speaking. ? Sudden confusion or trouble understanding simple statements. ? Sudden problems with walking or balance. ? A sudden, severe headache that is different from past headaches.    Call your doctor now or seek immediate medical care if:    · You have numbness or weakness that spreads beyond one side of your face.     · You have a skin rash or eye pain or redness, or light bothers your eyes.     · You have a new or worse headache.    Watch closely for changes in your health, and be sure to contact your doctor if:    · You do not get better as expected. Where can you learn more? Go to http://juliana-samy.info/. Enter P168 in the search box to learn more about \"Bell's Palsy: Care Instructions. \"  Current as of: June 4, 2018  Content Version: 11.8  © 0839-3187 [employer]. Care instructions adapted under license by [employer] (which disclaims liability or warranty for this information). If you have questions about a medical condition or this instruction, always ask your healthcare professional. [address] any warranty or liability for your use of this information. Pinkeye: Care Instructions  Your Care Instructions    Pinkeye is redness and swelling of the eye surface and the conjunctiva (the lining of the eyelid and the covering of the white part of the eye). Pinkeye is also called conjunctivitis. Pinkeye is often caused by infection with bacteria or a virus. Dry air, allergies, smoke, and chemicals are other common causes. Pinkeye often clears on its own in 7 to 10 days. Antibiotics only help if the pinkeye is caused by bacteria.  Pinkeye caused by infection spreads easily. If an allergy or chemical is causing pinkeye, it will not go away unless you can avoid whatever is causing it. Follow-up care is a key part of your treatment and safety. Be sure to make and go to all appointments, and call your doctor if you are having problems. It's also a good idea to know your test results and keep a list of the medicines you take. How can you care for yourself at home? · Wash your hands often. Always wash them before and after you treat pinkeye or touch your eyes or face. · Use moist cotton or a clean, wet cloth to remove crust. Wipe from the inside corner of the eye to the outside. Use a clean part of the cloth for each wipe. · Put cold or warm wet cloths on your eye a few times a day if the eye hurts. · Do not wear contact lenses or eye makeup until the pinkeye is gone. Throw away any eye makeup you were using when you got pinkeye. Clean your contacts and storage case. If you wear disposable contacts, use a new pair when your eye has cleared and it is safe to wear contacts again. · If the doctor gave you antibiotic ointment or eyedrops, use them as directed. Use the medicine for as long as instructed, even if your eye starts looking better soon. Keep the bottle tip clean, and do not let it touch the eye area. · To put in eyedrops or ointment:  ? Tilt your head back, and pull your lower eyelid down with one finger. ? Drop or squirt the medicine inside the lower lid. ? Close your eye for 30 to 60 seconds to let the drops or ointment move around. ? Do not touch the ointment or dropper tip to your eyelashes or any other surface. · Do not share towels, pillows, or washcloths while you have pinkeye. When should you call for help?   Call your doctor now or seek immediate medical care if:    · You have pain in your eye, not just irritation on the surface.     · You have a change in vision or loss of vision.     · You have an increase in discharge from the eye.     · Your eye has not started to improve or begins to get worse within 48 hours after you start using antibiotics.     · Pinkeye lasts longer than 7 days.    Watch closely for changes in your health, and be sure to contact your doctor if you have any problems. Where can you learn more? Go to http://juliana-samy.info/. Enter Y392 in the search box to learn more about \"Pinkeye: Care Instructions. \"  Current as of: November 20, 2017  Content Version: 11.8  © 1264-2511 [employer]. Care instructions adapted under license by [employer] (which disclaims liability or warranty for this information). If you have questions about a medical condition or this instruction, always ask your healthcare professional. [address] any warranty or liability for your use of this information.

## 2019-01-11 RX ORDER — ERYTHROMYCIN 5 MG/G
OINTMENT OPHTHALMIC
Qty: 3.5 G | Refills: 0 | Status: SHIPPED | OUTPATIENT
Start: 2019-01-11 | End: 2019-05-20 | Stop reason: ALTCHOICE

## 2019-01-11 RX ORDER — SULFAMETHOXAZOLE AND TRIMETHOPRIM 800; 160 MG/1; MG/1
1 TABLET ORAL 2 TIMES DAILY
Qty: 14 TAB | Refills: 0 | Status: SHIPPED | OUTPATIENT
Start: 2019-01-11 | End: 2019-05-20 | Stop reason: ALTCHOICE

## 2019-01-11 NOTE — TELEPHONE ENCOUNTER
Pt called. Saw dentist and endodontist no explanatin for facial droop. He is on meds for Roscoe palsy.   shani change to erythromycin ointment and bactrim to see if we can improve eye resolution

## 2019-01-18 RX ORDER — OLMESARTAN MEDOXOMIL 20 MG/1
TABLET ORAL
Qty: 90 TAB | Refills: 1 | Status: SHIPPED | OUTPATIENT
Start: 2019-01-18 | End: 2019-05-20

## 2019-02-12 ENCOUNTER — TELEPHONE (OUTPATIENT)
Dept: INTERNAL MEDICINE CLINIC | Age: 69
End: 2019-02-12

## 2019-02-12 NOTE — TELEPHONE ENCOUNTER
Patient called to request r/s his appointment from DR. Quintanilla to DR. Lanza for f/u as well as EST care. Please call back to advise.  [phone]

## 2019-02-13 ENCOUNTER — TELEPHONE (OUTPATIENT)
Dept: INTERNAL MEDICINE CLINIC | Age: 69
End: 2019-02-13

## 2019-02-13 NOTE — TELEPHONE ENCOUNTER
----- Message from [employer] sent at 2/13/2019 11:04 AM EST -----  Regarding: / Telephone  Pt interested in staying with the practice. He was a pt of , now wanting Dr. Yomi Pittman to be his pcp. I did recommend him to [facility] and he requested a call back from the office.  Best contact [phone]

## 2019-03-13 ENCOUNTER — OFFICE VISIT (OUTPATIENT)
Dept: DERMATOLOGY | Facility: AMBULATORY SURGERY CENTER | Age: 69
End: 2019-03-13

## 2019-03-13 VITALS
SYSTOLIC BLOOD PRESSURE: 124 MMHG | DIASTOLIC BLOOD PRESSURE: 72 MMHG | HEART RATE: 60 BPM | OXYGEN SATURATION: 95 % | TEMPERATURE: 98 F | BODY MASS INDEX: 41.75 KG/M2 | WEIGHT: 315 LBS | HEIGHT: 73 IN

## 2019-03-13 DIAGNOSIS — L82.1 SEBORRHEIC KERATOSES: ICD-10-CM

## 2019-03-13 DIAGNOSIS — L91.8 MULTIPLE ACQUIRED SKIN TAGS: ICD-10-CM

## 2019-03-13 DIAGNOSIS — R21 RASH AND OTHER NONSPECIFIC SKIN ERUPTION: Primary | ICD-10-CM

## 2019-03-13 DIAGNOSIS — D22.9 MULTIPLE BENIGN NEVI: ICD-10-CM

## 2019-03-13 DIAGNOSIS — D18.01 CHERRY ANGIOMA: ICD-10-CM

## 2019-03-13 DIAGNOSIS — B37.2 CANDIDAL INTERTRIGO: ICD-10-CM

## 2019-03-13 DIAGNOSIS — Z85.828 HISTORY OF NONMELANOMA SKIN CANCER: ICD-10-CM

## 2019-03-13 DIAGNOSIS — Z85.820 PERSONAL HISTORY OF MALIGNANT MELANOMA OF SKIN: ICD-10-CM

## 2019-03-13 DIAGNOSIS — L81.4 LENTIGINES: ICD-10-CM

## 2019-03-13 DIAGNOSIS — R60.0 EDEMA OF BOTH LEGS: ICD-10-CM

## 2019-03-13 RX ORDER — KETOCONAZOLE 20 MG/G
CREAM TOPICAL 2 TIMES DAILY
Qty: 60 G | Refills: 3 | Status: SHIPPED | OUTPATIENT
Start: 2019-03-13 | End: 2019-11-05

## 2019-03-13 NOTE — PROGRESS NOTES
Written by Brigid Lanes, as dictated by Noman Perez, [address]. Name: Juan Ramon Graham       Age: 79 y.o. Date: 3/13/2019    Chief Complaint:   Chief Complaint   Patient presents with    Skin Exam     full body       Subjective:    HPI  Mr. Juan Ramon Graham is a 79 y.o. male who presents for a full skin exam.  The patient's last skin exam was on 11/20/18 and the patient does have current complaints related to his skin. He reports a rash on the axillas that is bothersome. He states it improves with showering twice daily and using ketoconazole but worsens with exercise and when he showers daily. He reports stinging when washing his body with selsun blue which has helped his mid facial redness and pustules on the body. He will otherwise use a dial or donnie spring type soap. He is feeling well and in his usual state of health today. He has no current illnesses, no other skin concerns. His allergies, medications, medical, and social history are reviewed by me today. He reports he is transitioning care for his PCP and does not see his new PCP until May. The patient's pertinent skin history includes :   -BCC, left cheek, excision, 07/24/18  -BCC, left posterior shoulder, 06/20/17  -MM, left posterior shoulder, stage 1a, depth 0.3 mm, wide excision, 2015  -BCC, scalp and right popliteal area per t prior to first visit    ROS: Constitutional: Negative.     Dermatological : negative    Social History     Socioeconomic History    Marital status:      Spouse name: Not on file    Number of children: Not on file    Years of education: Not on file    Highest education level: Not on file   Social Needs    Financial resource strain: Not on file    Food insecurity - worry: Not on file    Food insecurity - inability: Not on file    Transportation needs - medical: Not on file   [employer] needs - non-medical: Not on file   Occupational History    Not on file   Tobacco Use    Smoking status: Never Smoker    Smokeless tobacco: Never Used   Substance and Sexual Activity    Alcohol use: Yes     Alcohol/week: 0.6 oz     Types: 1 Cans of beer per week    Drug use: No    Sexual activity: Not Currently     Partners: Female   Other Topics Concern    Not on file   Social History Narrative    Not on file       Family History   Problem Relation Age of Onset    Diabetes Mother     Heart Disease Mother         heart valve replaced    Hypertension Father     Colon Polyps Father     Cancer Brother         Stomach        Past Medical History:   Diagnosis Date    Cancer ([address]     melanoma     Gout     Hypercholesterolemia     Hypertension     Skin cancer     melanoma (left post shoulder) and BCC x 2 (scalp, right popliteal)    Sun-damaged skin     Thyroid disease        Past Surgical History:   Procedure Laterality Date    COLONOSCOPY N/A 5/29/2018    [facility]me [facility]    HX HEENT Left     partial thyroidectomy    HX ORTHOPAEDIC  1969    Left knee ACL     HX ORTHOPAEDIC  1979    dupytrens contractures    HX THYROIDECTOMY  05/2013    HX TONSILLECTOMY  1950    SKIN TISSUE PROCEDURE UNLISTED         Current Outpatient Medications   Medication Sig Dispense Refill    olmesartan (BENICAR) 20 mg tablet TAKE 1 TABLET DAILY 90 Tab 1    erythromycin (ILOTYCIN) ophthalmic ointment Apply 1 cm into left lower eyelid 4 to 6 times/day 3.5 g 0    ketoconazole (NIZORAL) 2 % topical cream Apply  to affected area two (2) times a day. 60 g 3    atorvastatin (LIPITOR) 10 mg tablet Take 1 Tab by mouth daily. 90 Tab 1    valsartan (DIOVAN) 160 mg tablet Take 1 Tab by mouth daily. 90 Tab 1    verapamil ER (CALAN-SR) 240 mg CR tablet TAKE 1 TABLET BY MOUTH TWICE A  Tab 1    allopurinol (ZYLOPRIM) 300 mg tablet Take 1 Tab by mouth daily. 90 Tab 1    cholecalciferol, vitamin D3, 2,000 unit tab Take  by mouth.  Omega-3 Fatty Acids 300 mg cap Take  by mouth.  calcium 500 mg tab Take  by mouth. With potassium      magnesium 250 mg tab Take 400 mg by mouth daily.  indomethacin SR (INDOCIN SR) 75 mg SR capsule Take 75 mg by mouth daily as needed.  trimethoprim-sulfamethoxazole (BACTRIM DS, SEPTRA DS) 160-800 mg per tablet Take 1 Tab by mouth two (2) times a day. 14 Tab 0    predniSONE (DELTASONE) 20 mg tablet Take 60 mg by mouth daily (with breakfast). 21 Tab 0    valACYclovir (VALTREX) 1 gram tablet Take 1 Tab by mouth three (3) times daily. 21 Tab 0    triamcinolone acetonide (KENALOG) 0.1 % topical cream Apply  to affected area two (2) times a day. use thin layer 45 g 3       No Known Allergies      Objective:    Visit Vitals  /72 (BP 1 Location: Right arm, BP Patient Position: Sitting)   Pulse 60   Temp 98 °F (36.7 °C) (Oral)   Ht 6' 1\" (1.854 m)   Wt 330 lb (149.7 kg)   SpO2 95%   BMI 43.54 kg/m²       Arnie Helms is a 79 y.o. male who appears well and in no distress. He is awake, alert, and oriented. There is no preauricular, submandibular, or cervical lymphadenopathy. A skin examination was performed including his scalp, face (including eyelids), ears, neck, chest, back, abdomen, upper extremities (including digits/nails), lower extremities, breasts, buttocks; genital skin was not examined. There are multiple well-healed scars without evidence of lesion recurrence. There is a well-healed melanoma scar on the left posterior shoulder without pigment, nodularity, or other evidence of lesion recurrence. There is confluent erythematous macular patch on the right axilla, most consistent with inflammatory rash - no fluorescence with the woods lamp and negative scraping for hyphae today. He has scattered red papules consistent with cherry angiomas. He has scattered waxy macules and keratotic papules consistent with seborrheic keratoses.  He has pink intradermal nevi (a few pink and brown) and brown junctional nevi, no concerning features for severe atypia. There are lentigines on sun exposed areas. There are multiple skin tags on the axillas. There is edema of the lower legs. Dry skin is noted on the lower legs. Assessment/Plan:    1. Personal history of melanoma and nonmelanoma skin cancer. I discussed sun protection, sunscreen use, the warning signs of skin cancer, the need for self-skin examinations, and the need for regular practitioner exams every 4 months. The patient should follow up sooner as needed if new, changing, or symptomatic skin lesions arise. 2. Inflammatory rash, right axilla. The differential diagnoses were reviewed. Scraping sample of rash was taken and there was no evidence of hyphae present under the microscope. I discussed changing to Clindamycin and taking a culture. The patient would like to continue Ketoconazole cream until the culture returns. A culture was take of the right axilla. I will call pt with results and any further treatment necessary. 3. Cherry angiomas. The diagnosis was reviewed and the patient was reassured that no treatment is needed for these benign lesions. 4. Seborrheic keratoses. The diagnosis was reviewed and the patient was reassured that no treatment is needed for these benign lesions. 5. Normal nevi. The diagnosis of normal nevi was reviewed. I discussed sun protection, sunscreen use, the warning signs of skin cancer, the need for self-skin examinations, and the need for regular practitioner exams every 4 months. The patient should follow up sooner as needed if new, changing, or symptomatic skin lesions arise. 6. Solar lentigos. The diagnosis and relationship to sun exposure was reviewed. Sun protection advised. 7. Skin tags, axillas. The diagnosis was discussed. The patient was reassured that no treatment is necessary at this time. 8. Edema, lower legs. The diagnosis was reviewed. I recommended the use of compression socks.      This plan was reviewed with the patient and patient agrees. All questions were answered. This scribe documentation was reviewed by me and accurately reflects the examination and decisions made by me.

## 2019-03-14 DIAGNOSIS — I10 ESSENTIAL HYPERTENSION: Chronic | ICD-10-CM

## 2019-03-14 RX ORDER — FUROSEMIDE 20 MG/1
20 TABLET ORAL DAILY
Qty: 90 TAB | Refills: 0 | Status: SHIPPED | OUTPATIENT
Start: 2019-03-14 | End: 2019-05-20 | Stop reason: SDUPTHER

## 2019-03-14 NOTE — TELEPHONE ENCOUNTER
Last visit noted, labs checked and refill deemed appropriate at this time. Verbal refill order authorized by covering physicians at [facility] for Dr. Octavia Poe during his absence.     Patient seeing Dr. Miguel Centers 5/20/19    Homa Christian, PharmD, BCPS, CDE

## 2019-03-16 LAB — BACTERIA SPEC AEROBE CULT: NORMAL

## 2019-05-01 NOTE — PROCEDURES
Rah Lezama M.D.  [phone]            2018          Colonoscopy Operative Report  Samy García  :  1950  [facility] Medical Record Number:  [id number]      Indications:    Personal history of colon polyps (screening only)     :  Lottie Davis MD    Referring Provider: Margaret Harris MD    Sedation:  MAC anesthesia    Pre-Procedural Exam:      Airway: clear,  No airway problems anticipated  Heart: RRR, without gallops or rubs  Lungs: clear bilaterally without wheezes, crackles, or rhonchi  Abdomen: soft, nontender, nondistended, bowel sounds present  Mental Status: awake, alert and oriented to person, place and time     Procedure Details:  After informed consent was obtained with all risks and benefits of procedure explained and preoperative exam completed, the patient was taken to the endoscopy suite and placed in the left lateral decubitus position. Upon sequential sedation as per above, a digital rectal exam was performed. The Olympus videocolonoscope  was inserted in the rectum and carefully advanced to the cecum, which was identified by the ileocecal valve and appendiceal orifice. The quality of preparation was good. The colonoscope was slowly withdrawn with careful inspection and evaluation between folds. Retroflexion in the rectum was performed. Findings:   Terminal Ileum: not intubated  Cecum: normal  Ascending Colon: normal  Transverse Colon: normal  Descending Colon: normal  Sigmoid: no mucosal lesion appreciated  mild diverticulosis; Rectum: normal    Interventions:  none    Specimen Removed:  none    Complications: None. EBL:  None. Impression:  Mild sigmoid diverticulosis, otherwise mucosa within normal.    Recommendations:  -Repeat colonoscopy in 5 years.   -High fiber diet.    -Resume normal medication(s). Discharge Disposition:  Home in the company of a  when able to ambulate.     Lottie Davis MD  2018  1:43 PM Comment: Patient psoriasis is flaking and pruritic. Has been on topicals and took 2 doses of humira but had mood changes following doses of humira. Discussed this is not a common side effect. Discussed topicals such as taclonex versus olux , discussed otezla and other biologics. Discussed risks and side effects including depression with otezla. Discussed patient has bowel symptom s but has not been diagnosed with IBD, discussed using IL23 because of this instead of il17. Discussed risk and side effects at length. Patient prefers to try tremfya.  Will obtain records and blood work and will precert. Detail Level: Zone

## 2019-05-20 ENCOUNTER — OFFICE VISIT (OUTPATIENT)
Dept: INTERNAL MEDICINE CLINIC | Age: 69
End: 2019-05-20

## 2019-05-20 VITALS
HEIGHT: 73 IN | SYSTOLIC BLOOD PRESSURE: 110 MMHG | DIASTOLIC BLOOD PRESSURE: 70 MMHG | BODY MASS INDEX: 41.75 KG/M2 | TEMPERATURE: 98.4 F | OXYGEN SATURATION: 97 % | HEART RATE: 61 BPM | RESPIRATION RATE: 19 BRPM | WEIGHT: 315 LBS

## 2019-05-20 DIAGNOSIS — I10 ESSENTIAL HYPERTENSION: Chronic | ICD-10-CM

## 2019-05-20 DIAGNOSIS — E78.00 PURE HYPERCHOLESTEROLEMIA: Chronic | ICD-10-CM

## 2019-05-20 DIAGNOSIS — Z23 ENCOUNTER FOR IMMUNIZATION: ICD-10-CM

## 2019-05-20 DIAGNOSIS — M10.00 IDIOPATHIC GOUT, UNSPECIFIED CHRONICITY, UNSPECIFIED SITE: Chronic | ICD-10-CM

## 2019-05-20 DIAGNOSIS — Z12.5 PROSTATE CANCER SCREENING: Primary | ICD-10-CM

## 2019-05-20 RX ORDER — INDOMETHACIN 75 MG/1
75 CAPSULE, EXTENDED RELEASE ORAL
Qty: 30 CAP | Refills: 1 | Status: SHIPPED | OUTPATIENT
Start: 2019-05-20 | End: 2019-08-07 | Stop reason: SDUPTHER

## 2019-05-20 RX ORDER — ATORVASTATIN CALCIUM 10 MG/1
10 TABLET, FILM COATED ORAL DAILY
Qty: 90 TAB | Refills: 1 | Status: SHIPPED | OUTPATIENT
Start: 2019-05-20 | End: 2020-03-09

## 2019-05-20 RX ORDER — FUROSEMIDE 20 MG/1
20 TABLET ORAL
Qty: 10 TAB | Refills: 0 | Status: SHIPPED | OUTPATIENT
Start: 2019-05-20 | End: 2019-08-24

## 2019-05-20 RX ORDER — ALLOPURINOL 300 MG/1
300 TABLET ORAL DAILY
Qty: 90 TAB | Refills: 1 | Status: SHIPPED | OUTPATIENT
Start: 2019-05-20 | End: 2019-12-10 | Stop reason: SDUPTHER

## 2019-05-20 NOTE — PROGRESS NOTES
New Patient Evaluation    Margo Peng is a 79 y.o. male. They are here to establish care with the group and me as a primary care provider. he has seen Dr. Juliette Kennedy in the past.  The last visit was In Dec    He has a history of hypertension. Stable now. On Valsartan and Verapamil. He has been on the medications for the past 20 years. He has a history of gout. He had a flare recently. Treated with indomethacin. He is asking about treatment of further gouty flares. He has a history of melanoma. This was done a year ago. He sees Dr. Roc Simpson for dermatology. Sees every 4 months. He had 1/2 of his thyroid removed years ago. Follow up endocrine [facility]. He has had colonoscopy every 5 years. He had this at age 74. Mild diverticulosis. He is on Furosemide. Started by [facility]. He is on this for lower ext edema    On Atorvastatin for cholesterol. Stable at this time. Patient Active Problem List    Diagnosis Date Noted    Obesity, morbid ([address] 03/13/2018    Hypertension 02/24/2016    Hyperlipidemia 02/24/2016    Gout 02/24/2016    Multinodular goiter 02/24/2016    Melanoma ([address] 02/24/2016    Hx of adenomatous colonic polyps 02/24/2015     Current Outpatient Medications   Medication Sig Dispense Refill    verapamil ER (CALAN-SR) 240 mg CR tablet TAKE 1 TABLET BY MOUTH TWICE A  Tab 0    ketoconazole (NIZORAL) 2 % topical cream Apply  to affected area two (2) times a day. 60 g 3    atorvastatin (LIPITOR) 10 mg tablet Take 1 Tab by mouth daily. 90 Tab 1    valsartan (DIOVAN) 160 mg tablet Take 1 Tab by mouth daily. 90 Tab 1    allopurinol (ZYLOPRIM) 300 mg tablet Take 1 Tab by mouth daily. 90 Tab 1    cholecalciferol, vitamin D3, 2,000 unit tab Take  by mouth.  Omega-3 Fatty Acids 300 mg cap Take  by mouth.  magnesium 250 mg tab Take 400 mg by mouth daily.  indomethacin SR (INDOCIN SR) 75 mg SR capsule Take 75 mg by mouth daily as needed.       furosemide (LASIX) 20 mg tablet TAKE 1 TAB BY MOUTH DAILY. 90 Tab 0    calcium 500 mg tab Take  by mouth. With potassium       No Known Allergies  Past Medical History:   Diagnosis Date    Cancer ([address]     melanoma     Gout     Hypercholesterolemia     Hypertension     Skin cancer     melanoma (left post shoulder) and BCC x 2 (scalp, right popliteal)    Sun-damaged skin     Thyroid disease      Past Surgical History:   Procedure Laterality Date    COLONOSCOPY N/A 5/29/2018    COLON[facility] by Perla Ballesteros MD at [facility] ENDOSCOPY    HX HEENT Left     partial thyroidectomy    HX ORTHOPAEDIC  1969    Left knee ACL     HX ORTHOPAEDIC  1979    dupytrens contractures    HX THYROIDECTOMY  05/2013    HX TONSILLECTOMY  1950    SKIN TISSUE PROCEDURE UNLISTED       Family History   Problem Relation Age of Onset    Diabetes Mother     Heart Disease Mother         heart valve replaced    Hypertension Father     Colon Polyps Father     Cancer Brother         Stomach      Social History     Tobacco Use    Smoking status: Never Smoker    Smokeless tobacco: Never Used   Substance Use Topics    Alcohol use: Yes     Alcohol/week: 0.6 oz     Types: 1 Cans of beer per week        Health Maintenance   Topic Date Due    GLAUCOMA SCREENING Q2Y  10/23/2017    Shingrix Vaccine Age 50> (2 of 2) 06/19/2018    MEDICARE YEARLY EXAM  03/14/2019    Pneumococcal 65+ years (2 of 2 - PPSV23) 04/24/2019    Influenza Age 9 to Adult  08/01/2019    COLONOSCOPY  05/29/2023    DTaP/Tdap/Td series (2 - Td) 11/24/2024    Hepatitis C Screening  Completed       Review of Systems   Constitutional: Negative. Respiratory: Negative. Cardiovascular: Negative. Gastrointestinal: Negative.           Visit Vitals  /70 (BP 1 Location: Right arm, BP Patient Position: Sitting)   Pulse 61   Temp 98.4 °F (36.9 °C) (Oral)   Resp 19   Ht 6' 1\" (1.854 m)   Wt 318 lb 6.4 oz (144.4 kg)   SpO2 97%   BMI 42.01 kg/m²       Physical Exam   Constitutional: No distress. Pulmonary/Chest: Effort normal and breath sounds normal.   Abdominal: Soft. Bowel sounds are normal.   Musculoskeletal: He exhibits edema (with mild erythema at the right foot. Minimally tender to palpation. ). ASSESSMENT/PLAN    Diagnoses and all orders for this visit:    1. Prostate cancer screening  -     PSA SCREENING (SCREENING)    2. Idiopathic gout, unspecified chronicity, unspecified site  -     URIC ACID  -     indomethacin SR (INDOCIN SR) 75 mg SR capsule; Take 1 Cap by mouth daily as needed (gout flare). -     allopurinol (ZYLOPRIM) 300 mg tablet; Take 1 Tab by mouth daily. 3. Pure hypercholesterolemia  -     CBC WITH AUTOMATED DIFF  -     METABOLIC PANEL, COMPREHENSIVE  -     LIPID PANEL  -     HEMOGLOBIN A1C WITH EAG  -     atorvastatin (LIPITOR) 10 mg tablet; Take 1 Tab by mouth daily. 4. Essential hypertension  -     furosemide (LASIX) 20 mg tablet; Take 1 Tab by mouth daily as needed (edema). 5. Encounter for immunization  -     PNEUMOCOCCAL POLYSACCHARIDE VACCINE, 23-VALENT, ADULT OR IMMUNOSUPPRESSED PT DOSE,  -     ADMIN INFLUENZA VIRUS VAC  -     ADMIN PNEUMOCOCCAL VACCINE            -Discussed with the patient to continue the current plan of care. We will obtain baseline labwork and determine if any adjustments need to be done. We will also await the records of the previous PCP to ascertain further details of the patient's history. The patient agrees with and understands the plan of care. All questions have been answered.

## 2019-05-20 NOTE — PATIENT INSTRUCTIONS
Purine-Restricted Diet: Care Instructions Your Care Instructions Purines are substances that are found in some foods. Your body turns purines into uric acid. High levels of uric acid can cause gout, which is a form of arthritis that causes pain and inflammation in joints. You may be able to help control the amount of uric acid in your body by limiting high-purine foods in your diet. Follow-up care is a key part of your treatment and safety. Be sure to make and go to all appointments, and call your doctor if you are having problems. It's also a good idea to know your test results and keep a list of the medicines you take. How can you care for yourself at home? · Plan your meals and snacks around foods that are low in purines and are safe for you to eat. These foods include: ? Green vegetables and tomatoes. ? Fruits. ? Whole-grain breads, rice, and cereals. ? Eggs, peanut butter, and nuts. ? Low-fat milk, cheese, and other milk products. ? Popcorn. ? Gelatin desserts, chocolate, cocoa, and cakes and sweets, in small amounts. · You can eat certain foods that are medium-high in purines, but eat them only once in a while. These foods include: 
? Legumes, such as dried beans and dried peas. You can have 1 cup cooked legumes each day. ? Asparagus, cauliflower, spinach, mushrooms, and green peas. ? Fish and seafood (other than very high-purine seafood). ? Oatmeal, wheat bran, and wheat germ. · Limit very high-purine foods, including: 
? Organ meats, such as liver, kidneys, sweetbreads, and brains. ? Meats, including riggs, beef, pork, and lamb. ? Game meats and any other meats in large amounts. ? Anchovies, sardines, herring, mackerel, and scallops. ? Gravy. ? Beer. Where can you learn more? Go to http://juliana-samy.info/. Enter F448 in the search box to learn more about \"Purine-Restricted Diet: Care Instructions. \" Current as of: March 28, 2018 Content Version: 11.9 © 0328-5932 Healthwise, [employer]. Care instructions adapted under license by [employer] (which disclaims liability or warranty for this information). If you have questions about a medical condition or this instruction, always ask your healthcare professional. [address] any warranty or liability for your use of this information. Vaccine Information Statement Pneumococcal Polysaccharide Vaccine: What You Need to Know Many Vaccine Information Statements are available in Frisian and other languages. See www.immunize.org/vis. Hojas de información Sobre Vacunas están disponibles en español y en muchos otros idiomas. Visite [facility].si. 1. Why get vaccinated? Vaccination can protect older adults (and some children and younger adults) from pneumococcal disease. Pneumococcal disease is caused by bacteria that can spread from person to person through close contact. It can cause ear infections, and it can also lead to more serious infections of the: 
 Lungs (pneumonia),  Blood (bacteremia), and 
 Covering of the brain and spinal cord (meningitis). Meningitis can cause deafness and brain damage, and it can be fatal.   
 
Anyone can get pneumococcal disease, but children under 3years of age, people with certain medical conditions, adults over 72years of age, and cigarette smokers are at the highest risk. About 18,000 older adults die each year from pneumococcal disease in the [place]. Treatment of pneumococcal infections with penicillin and other drugs used to be more effective. But some strains of the disease have become resistant to these drugs. This makes prevention of the disease, through vaccination, even more important. 2. Pneumococcal polysaccharide vaccine (PPSV23) Pneumococcal polysaccharide vaccine (PPSV23) protects against 23 types of pneumococcal bacteria. It will not prevent all pneumococcal disease. PPSV23 is recommended for:  All adults 72years of age and older,  Anyone 2 through 59years of age with certain long-term health problems, 
 Anyone 2 through 59years of age with a weakened immune system, 
 Adults 23 through 59years of age who smoke cigarettes or have asthma. Most people need only one dose of PPSV. A second dose is recommended for certain high-risk groups. People 72 and older should get a dose even if they have gotten one or more doses of the vaccine before they turned 65. Your healthcare provider can give you more information about these recommendations. Most healthy adults develop protection within 2 to 3 weeks of getting the shot. 3. Some people should not get this vaccine  Anyone who has had a life-threatening allergic reaction to PPSV should not get another dose.  Anyone who has a severe allergy to any component of PPSV should not receive it. Tell your provider if you have any severe allergies.  Anyone who is moderately or severely ill when the shot is scheduled may be asked to wait until they recover before getting the vaccine. Someone with a mild illness can usually be vaccinated.  Children less than 3years of age should not receive this vaccine.  There is no evidence that PPSV is harmful to either a pregnant woman or to her fetus. However, as a precaution, women who need the vaccine should be vaccinated before becoming pregnant, if possible. 4. Risks of a vaccine reaction With any medicine, including vaccines, there is a chance of side effects. These are usually mild and go away on their own, but serious reactions are also possible. About half of people who get PPSV have mild side effects, such as redness or pain where the shot is given, which go away within about two days. Less than 1 out of 100 people develop a fever, muscle aches, or more severe local reactions. Problems that could happen after any vaccine:  People sometimes faint after a medical procedure, including vaccination. Sitting or lying down for about 15 minutes can help prevent fainting, and injuries caused by a fall. Tell your doctor if you feel dizzy, or have vision changes or ringing in the ears.  Some people get severe pain in the shoulder and have difficulty moving the arm where a shot was given. This happens very rarely.  Any medication can cause a severe allergic reaction. Such reactions from a vaccine are very rare, estimated at about 1 in a million doses, and would happen within a few minutes to a few hours after the vaccination. As with any medicine, there is a very remote chance of a vaccine causing a serious injury or death. The safety of vaccines is always being monitored. For more information, visit: www.cdc.gov/vaccinesafety/  
 
5. What if there is a serious reaction? What should I look for? Look for anything that concerns you, such as signs of a severe allergic reaction, very high fever, or unusual behavior. Signs of a severe allergic reaction can include hives, swelling of the face and throat, difficulty breathing, a fast heartbeat, dizziness, and weakness. These would usually start a few minutes to a few hours after the vaccination. What should I do? If you think it is a severe allergic reaction or other emergency that cant wait, call 9-1-1 or get to the nearest hospital. Otherwise, call your doctor. Afterward, the reaction should be reported to the Vaccine Adverse Event Reporting System (VAERS). Your doctor might file this report, or you can do it yourself through the VAERS web site at www.vaers. [facility].gov, or by calling 0-678.749.9033. VAERS does not give medical advice. 6. How can I learn more?  Ask your doctor. He or she can give you the vaccine package insert or suggest other sources of information.  Call your local or state health department.  Contact the Centers for Disease Control and Prevention (CDC): 
- Call 2-694.893.7620 (1-800-CDC-INFO) or 
- Visit CDCs website at www.cdc.gov/vaccines Vaccine Information Statement PPSV  
04/24/2015 Department of [facility] and [employer] Centers for Disease Control and Prevention Office Use Only

## 2019-05-20 NOTE — PROGRESS NOTES
Medications Discontinued During This Encounter   Medication Reason    erythromycin (ILOTYCIN) ophthalmic ointment Therapy Completed    olmesartan (BENICAR) 20 mg tablet Not A Current Medication    predniSONE (DELTASONE) 20 mg tablet Therapy Completed    triamcinolone acetonide (KENALOG) 0.1 % topical cream Alternate Therapy    trimethoprim-sulfamethoxazole (BACTRIM DS, SEPTRA DS) 160-800 mg per tablet Therapy Completed    valACYclovir (VALTREX) 1 gram tablet Therapy Completed     Verbal order read back Dr. Fer chau to remove medication from list.   James Kim is a 79 y.o. male who presents for routine immunizations. He denies any symptoms , reactions or allergies that would exclude them from being immunized today. Risks and adverse reactions were discussed and the VIS was given to them. All questions were addressed. He was observed for 5 min post injection. There were no reactions observed.     Tiffanie Dense

## 2019-05-22 DIAGNOSIS — I10 ESSENTIAL HYPERTENSION: Primary | Chronic | ICD-10-CM

## 2019-05-22 RX ORDER — VALSARTAN 160 MG/1
160 TABLET ORAL DAILY
Qty: 90 TAB | Refills: 1 | Status: SHIPPED | OUTPATIENT
Start: 2019-05-22 | End: 2019-12-11 | Stop reason: SDUPTHER

## 2019-06-24 ENCOUNTER — OFFICE VISIT (OUTPATIENT)
Dept: INTERNAL MEDICINE CLINIC | Age: 69
End: 2019-06-24

## 2019-06-24 VITALS
RESPIRATION RATE: 17 BRPM | TEMPERATURE: 99.1 F | OXYGEN SATURATION: 96 % | BODY MASS INDEX: 41.27 KG/M2 | WEIGHT: 311.4 LBS | DIASTOLIC BLOOD PRESSURE: 60 MMHG | HEIGHT: 73 IN | SYSTOLIC BLOOD PRESSURE: 109 MMHG | HEART RATE: 62 BPM

## 2019-06-24 DIAGNOSIS — S76.212A GROIN STRAIN, LEFT, INITIAL ENCOUNTER: Primary | ICD-10-CM

## 2019-06-24 NOTE — PATIENT INSTRUCTIONS
Groin Strain: Care Instructions  Your Care Instructions  A groin strain is an injury that happens when you tear or overstretch (pull) a groin muscle. The groin muscles are in the area on either side of the body in the folds where the belly joins the legs. You can strain a groin muscle during exercise, such as running, skating, kicking in soccer, or playing basketball. It can happen when you lift, push, or pull heavy objects. You might pull a groin muscle when you fall. The injury can range from a minor pull to a more serious tear of the muscle. You may feel pain and tenderness that's worse when you squeeze your legs together. You may also have pain when you raise the knee of the injured side. There may be swelling or bruising in the groin area or inner thigh. If you have a bad strain, you may walk with a limp while it heals. Rest and other home care can help the muscle heal. Healing can take up to 3 weeks or more. Your doctor may want to see you again in 2 to 3 weeks. Follow-up care is a key part of your treatment and safety. Be sure to make and go to all appointments, and call your doctor if you are having problems. It's also a good idea to know your test results and keep a list of the medicines you take. How can you care for yourself at home? · Be safe with medicines. Read and follow all instructions on the label. ? If the doctor gave you a prescription medicine for pain, take it as prescribed. ? If you are not taking a prescription pain medicine, ask your doctor if you can take an over-the-counter medicine. · Rest and protect your injured or sore groin area for 1 to 2 weeks. Stop, change, or take a break from any activity that may be causing your pain or soreness. Do not do intense activities while you still have pain. · Put ice or a cold pack on your groin area for 10 to 20 minutes at a time. Try to do this every 1 to 2 hours for the next 3 days (when you are awake) or until the swelling goes down. Put a thin cloth between the ice and your skin. · After 2 or 3 days, if your swelling is gone, apply heat. Put a warm water bottle, a heating pad set on low, or a warm cloth on your groin area. Do not go to sleep with a heating pad on your skin. · If your doctor gave you crutches, make sure you use them as directed. · Wear snug shorts or underwear that support the injured area. When should you call for help? Call your doctor now or seek immediate medical care if:    · You have new or severe pain or swelling in the groin area.     · Your groin or upper thigh is cool or pale or changes color.     · You have tingling, weakness, or numbness in your groin or leg.     · You cannot move your leg.     · You cannot put weight on your leg.    Watch closely for changes in your health, and be sure to contact your doctor if:    · You do not get better as expected. Where can you learn more? Go to http://juliana-samy.info/. Enter U642 in the search box to learn more about \"Groin Strain: Care Instructions. \"  Current as of: September 20, 2018  Content Version: 11.9  © 1244-5377 [employer]20. Care instructions adapted under license by [employer] (which disclaims liability or warranty for this information). If you have questions about a medical condition or this instruction, always ask your healthcare professional. [address] any warranty or liability for your use of this information.

## 2019-07-16 ENCOUNTER — OFFICE VISIT (OUTPATIENT)
Dept: DERMATOLOGY | Facility: AMBULATORY SURGERY CENTER | Age: 69
End: 2019-07-16

## 2019-07-16 ENCOUNTER — HOSPITAL ENCOUNTER (OUTPATIENT)
Dept: LAB | Age: 69
Discharge: HOME | End: 2019-07-16

## 2019-07-16 VITALS
RESPIRATION RATE: 18 BRPM | DIASTOLIC BLOOD PRESSURE: 74 MMHG | OXYGEN SATURATION: 96 % | BODY MASS INDEX: 41.22 KG/M2 | HEART RATE: 56 BPM | WEIGHT: 311 LBS | HEIGHT: 73 IN | SYSTOLIC BLOOD PRESSURE: 130 MMHG | TEMPERATURE: 97.9 F

## 2019-07-16 DIAGNOSIS — L81.4 LENTIGINES: ICD-10-CM

## 2019-07-16 DIAGNOSIS — Z85.820 PERSONAL HISTORY OF MALIGNANT MELANOMA OF SKIN: ICD-10-CM

## 2019-07-16 DIAGNOSIS — L91.8 CUTANEOUS SKIN TAGS: ICD-10-CM

## 2019-07-16 DIAGNOSIS — L73.8 SEBACEOUS HYPERPLASIA: ICD-10-CM

## 2019-07-16 DIAGNOSIS — S90.221A TOENAIL BRUISE, RIGHT, INITIAL ENCOUNTER: ICD-10-CM

## 2019-07-16 DIAGNOSIS — D48.5 NEOPLASM OF UNCERTAIN BEHAVIOR OF SKIN OF BACK: Primary | ICD-10-CM

## 2019-07-16 DIAGNOSIS — Z85.828 HISTORY OF NONMELANOMA SKIN CANCER: ICD-10-CM

## 2019-07-16 DIAGNOSIS — D22.9 MULTIPLE BENIGN NEVI: ICD-10-CM

## 2019-07-16 DIAGNOSIS — D69.2 PIGMENTED PURPURA (HCC): ICD-10-CM

## 2019-07-16 DIAGNOSIS — L82.1 SEBORRHEIC KERATOSES: ICD-10-CM

## 2019-07-16 DIAGNOSIS — D18.01 CHERRY ANGIOMA: ICD-10-CM

## 2019-07-16 NOTE — PROGRESS NOTES
Written by Candie Wray, as dictated by Cory Seen Rhea Hatchet, [address]. Name: Steven Tee       Age: 79 y.o. Date: 7/16/2019    Chief Complaint:   Chief Complaint   Patient presents with    Skin Exam       Subjective:    HPI  Mr. Steven Tee is a 79 y.o. male who presents for a full skin exam.  The patient's last skin exam was on 3/13/19 and the patient does have current complaints related to his skin. He reports an itchy lesion on his left lower leg that he says is where he had a bug bite 1 year ago. The patient has a dark pigmented area under the right great toe nail. He says that the area was clearing up and he thought he drained the blood outand now it seems like the color is spreading to the sides. He says he initially hit something while kicking his foot while wearing a steel toe boot. He has an intermittent rash on his left anterior lower leg. He reports Triamcinolone cream with significant relief and notes that staying off the leg provides relief as well. He states, \"if it happens Friday, it's usually gone by Monday\". He also notes two bug bites that he got on his right side that caused a rash. He reports continued improvement on the axillary rash with the topical cream I prescribed and showering twice daily. He is feeling well and in his usual state of health today. He has no current illnesses, no other skin concerns. His allergies, medications, medical, and social history are reviewed by me today. Something in shirt bit twice on the right lateral back  The patient's pertinent skin history includes : MM, NMSC  -BCC, left cheek, excision, 07/24/18  -BCC, left posterior shoulder, 06/20/17  -MM, left posterior shoulder, stage 1a, depth 0.3 mm, wide excision, 2015  -BCC, scalp and right popliteal area per t prior to first visit      ROS: Constitutional: Negative.     Dermatological : positive for - skin lesion changes      Social History     Socioeconomic History    Marital status:      Spouse name: Not on file    Number of children: Not on file    Years of education: Not on file    Highest education level: Not on file   Occupational History    Not on file   Social Needs    Financial resource strain: Not on file    Food insecurity:     Worry: Not on file     Inability: Not on file    Transportation needs:     Medical: Not on file     Non-medical: Not on file   Tobacco Use    Smoking status: Never Smoker    Smokeless tobacco: Never Used   Substance and Sexual Activity    Alcohol use:  Yes     Alcohol/week: 0.6 oz     Types: 1 Cans of beer per week    Drug use: No    Sexual activity: Not Currently     Partners: Female   Lifestyle    Physical activity:     Days per week: Not on file     Minutes per session: Not on file    Stress: Not on file   Relationships    Social connections:     Talks on phone: Not on file     Gets together: Not on file     Attends Taoism service: Not on file     Active member of club or organization: Not on file     Attends meetings of clubs or organizations: Not on file     Relationship status: Not on file    Intimate partner violence:     Fear of current or ex partner: Not on file     Emotionally abused: Not on file     Physically abused: Not on file     Forced sexual activity: Not on file   Other Topics Concern    Not on file   Social History Narrative    Not on file       Family History   Problem Relation Age of Onset    Diabetes Mother     Heart Disease Mother         heart valve replaced    Hypertension Father     Colon Polyps Father     Cancer Brother         Stomach        Past Medical History:   Diagnosis Date    Cancer ([facility] [address]     melanoma     Gout     Hypercholesterolemia     Hypertension     Skin cancer     melanoma (left post shoulder) and BCC x 2 (scalp, right popliteal)    Sun-damaged skin     Thyroid disease        Past Surgical History:   Procedure Laterality Date    COLONOSCOPY N/A 5/29/2018    COLONOSCOPY performed by Tatyana Antoine MD at [facility] HX HEENT Left     partial thyroidectomy    HX ORTHOPAEDIC  1969    Left knee ACL     HX ORTHOPAEDIC  1979    dupytrens contractures    HX THYROIDECTOMY  05/2013    HX TONSILLECTOMY  1950    SKIN TISSUE PROCEDURE UNLISTED         Current Outpatient Medications   Medication Sig Dispense Refill    valsartan (DIOVAN) 160 mg tablet Take 1 Tab by mouth daily. 90 Tab 1    indomethacin SR (INDOCIN SR) 75 mg SR capsule Take 1 Cap by mouth daily as needed (gout flare). 30 Cap 1    allopurinol (ZYLOPRIM) 300 mg tablet Take 1 Tab by mouth daily. (Patient taking differently: Take 300 mg by mouth daily as needed.) 90 Tab 1    atorvastatin (LIPITOR) 10 mg tablet Take 1 Tab by mouth daily. 90 Tab 1    verapamil ER (CALAN-SR) 240 mg CR tablet TAKE 1 TABLET BY MOUTH TWICE A  Tab 0    ketoconazole (NIZORAL) 2 % topical cream Apply  to affected area two (2) times a day. 60 g 3    cholecalciferol, vitamin D3, 2,000 unit tab Take  by mouth.  Omega-3 Fatty Acids 300 mg cap Take  by mouth.  calcium 500 mg tab Take  by mouth. With potassium      magnesium 250 mg tab Take 400 mg by mouth daily.  furosemide (LASIX) 20 mg tablet Take 1 Tab by mouth daily as needed (edema). (Patient not taking: Reported on 7/16/2019) 10 Tab 0       No Known Allergies      Objective:    Visit Vitals  /74 (BP 1 Location: Left arm, BP Patient Position: Sitting)   Pulse (!) 56   Temp 97.9 °F (36.6 °C) (Oral)   Resp 18   Ht 6' 1\" (1.854 m)   Wt 311 lb (141.1 kg)   SpO2 96%   BMI 41.03 kg/m²       Cydney Galaviz is a 79 y.o. male who appears well and in no distress. He is awake, alert, and oriented. There is no preauricular, submandibular, or cervical lymphadenopathy.   A skin examination was performed including his scalp, face (including eyelids), ears, neck, chest, back, abdomen, upper extremities (including digits/nails), lower extremities, breasts, buttocks; genital skin was not examined. He has a well healed scar on the left posterior shoulder without evidence of pigment nodularity or lesion recurrence. He has multiple well healed scars without evidence of lesion recurrence. He has a 5 x 4 mm pink macule on the left upper back concerning for BCC. He has pink intradermal nevi and numerous brown junctional nevi, no concerning features for severe atypia. He has scattered red papules consistent with cherry angiomas. There are lentigines on sun exposed areas. He has multiple skin tags on his axillas. There are pink/yellow papules on the face consistent with sebaceous hyperplasia. He has darkened distal and lateral aspects of the right great toenail with subungal dark red material consistent with blood. There appears to be bruising at the proximal aspect of the nail as well. There is lifting of the toenail. He has non blanchable pink and erythematous macular rash on the anterior shins that appears somewhat spared by his sock line, most consistent with pigmented purpura eruption. Photos from today's visit:     Left upper back        Assessment/Plan:  1. Personal history of melanoma and nonmelanoma skin cancer. I discussed sun protection, sunscreen use, the warning signs of skin cancer, the need for self-skin examinations, and the need for regular practitioner exams every 6 months. The patient should follow up sooner as needed if new, changing, or symptomatic skin lesions arise. 2. Neoplasm of Uncertain Behavior, left upper back, R/O BCC. The differential diagnoses were discussed. A shave biopsy was advised to sample this lesion. The procedure was reviewed and verbal and written consent were obtained. The risks of pain, bleeding, infection, and scar were discussed. The patient is aware that this is a sample and is intended for diagnosis and not therapy of the skin lesion. I performed the procedure.   The site was cleansed and anesthetized with 1% Lidocaine with Epinephrine 1:100,000. A shave biopsy was performed to sample the lesion. Drysol was used for hemostasis. The wound was bandaged and care reviewed. The specimen was sent to pathology. I will contact the patient with the results and any further treatment that may be necessary. 3. Seborrheic keratoses. The diagnosis was reviewed and the patient was reassured that no treatment is needed for these benign lesions. 4. Normal nevi. The diagnosis of normal nevi was reviewed. I discussed sun protection, sunscreen use, the warning signs of skin cancer, the need for self-skin examinations, and the need for regular practitioner exams every 6 months. The patient should follow up sooner as needed if new, changing, or symptomatic skin lesions arise. 5. Cherry angiomas. The diagnosis was reviewed and the patient was reassured that no treatment is needed for these benign lesions. 6. Solar lentigos. The diagnosis and relationship to sun exposure was reviewed. Sun protection advised. 7. Skin tags, axillas. The diagnosis was discussed. The patient was reassured that no treatment is necessary at this time. 8. Sebaceous Hyperplasia. The diagnosis was discussed as well as the benign nature of this condition. The patient was reassured that no treatment is needed at this time. 9. Pigmented purpura eruption. The diagnosis was discussed. The patient was reassured that no treatment is necessary at this time. 10. Subungal hematoma/ blood. Patient reassured. Discussed toenail may come off and to keep it trimmed short. This plan was reviewed with the patient and patient agrees. All questions were answered. This scribe documentation was reviewed by me and accurately reflects the examination and decisions made by me. [facility]   OFFICE PROCEDURE PROGRESS NOTE   Chart reviewed for the following:   I, Yennifer Stallings  [facility], have reviewed the History, Physical and updated the Allergic reactions for [facility].    TIME OUT performed immediately prior to start of procedure:   Cori CLEMENTE, have performed the following reviews on [facility]   prior to the start of the procedure:     * Patient was identified by name and date of birth   * Agreement on procedure being performed was verified   * Risks and Benefits explained to the patient   * Procedure site verified and marked as necessary   * Patient was positioned for comfort   * Consent was signed and verified     Time: 10:50 AM  Date of procedure: 7/16/2019  Procedure performed by: Lamont Arzate.  Colton Vasquez  Provider assisted by: Cleophas Cushing, LPN  Patient assisted by: self   How tolerated by patient: tolerated the procedure well with no complications   Comments: none

## 2019-07-16 NOTE — PROGRESS NOTES
Identified pt with two pt identifiers(name and ). Reviewed record in preparation for visit and have obtained necessary documentation. Chief Complaint   Patient presents with    Skin Exam      Visit Vitals  /74 (BP 1 Location: Left arm, BP Patient Position: Sitting)   Pulse (!) 56   Temp 97.9 °F (36.6 °C) (Oral)   Resp 18   Ht 6' 1\" (1.854 m)   Wt 311 lb (141.1 kg)   SpO2 96%   BMI 41.03 kg/m²     Health Maintenance Due   Topic    GLAUCOMA SCREENING Q2Y     MEDICARE YEARLY EXAM        Coordination of Care Questionnaire:  :   1) Have you been to an emergency room, urgent care, or hospitalized since your last visit? If yes, where when, and reason for visit? No         2. Have seen or consulted any other health care provider since your last visit? If yes, where when, and reason for visit? No         Patient is accompanied by self I have received verbal consent from Governor Pablito to discuss any/all medical information while they are present in the room.

## 2019-07-18 NOTE — PROGRESS NOTES
I LM as he requested with path results showing BCC with narrow clear margins. I will recheck in November but no further tx at this time.

## 2019-08-01 NOTE — PROGRESS NOTES
Acute Care Note    Elidia Davis is 79 y.o. male. he presents for evaluation of Groin Injury      The patient reports having been walking in his yard a few days ago and stepped into a hole by accident. He subsequently felt a pull in his left groin. This has caused pain which has been ongoing. He has tried ice to the area with modest improvement. He denies swelling or redness to the area. Prior to Admission medications    Medication Sig Start Date End Date Taking? Authorizing Provider   valsartan (DIOVAN) 160 mg tablet Take 1 Tab by mouth daily. 5/22/19  Yes Taylor Wilson MD   indomethacin SR (INDOCIN SR) 75 mg SR capsule Take 1 Cap by mouth daily as needed (gout flare). 5/20/19  Yes Taylor Wilson MD   allopurinol (ZYLOPRIM) 300 mg tablet Take 1 Tab by mouth daily. Patient taking differently: Take 300 mg by mouth daily as needed. 5/20/19  Yes Taylor Wilson MD   atorvastatin (LIPITOR) 10 mg tablet Take 1 Tab by mouth daily. 5/20/19  Yes Taylor Wilson MD   furosemide (LASIX) 20 mg tablet Take 1 Tab by mouth daily as needed (edema). Patient not taking: Reported on 7/16/2019 5/20/19  Yes Taylor Wilson MD   ketoconazole (NIZORAL) 2 % topical cream Apply  to affected area two (2) times a day. 3/13/19  Yes Antonio GRECO, PABLITO   cholecalciferol, vitamin D3, 2,000 unit tab Take  by mouth. Yes Provider, Historical   Omega-3 Fatty Acids 300 mg cap Take  by mouth. Yes Provider, Historical   calcium 500 mg tab Take  by mouth. With potassium   Yes Provider, Historical   magnesium 250 mg tab Take 400 mg by mouth daily.    Yes Provider, Historical   verapamil ER (CALAN-SR) 240 mg CR tablet TAKE 1 TABLET BY MOUTH TWICE A DAY 7/23/19   Taylor Wilson MD         Patient Active Problem List   Diagnosis Code    Hypertension I10    Hyperlipidemia E78.5    Gout M10.9    Multinodular goiter E04.2    Melanoma ([facility] [address] C43.9    Hx of adenomatous colonic polyps Z86.010    Obesity, morbid ([facility] [address] E66.01         Review of Systems   Constitutional: Negative. Respiratory: Negative. Musculoskeletal:        Groin pain as per HPI         Visit Vitals  /60 (BP 1 Location: Right arm, BP Patient Position: Sitting)   Pulse 62   Temp 99.1 °F (37.3 °C) (Oral)   Resp 17   Ht 6' 1\" (1.854 m)   Wt 311 lb 6.4 oz (141.3 kg)   SpO2 96%   BMI 41.08 kg/m²       Physical Exam   Constitutional: No distress. Cardiovascular: Normal rate and regular rhythm. Musculoskeletal: He exhibits tenderness (present at the right groin). ASSESSMENT/PLAN  Diagnoses and all orders for this visit:    1. Groin strain, left, initial encounter - Reviewed the use of rest, icing the affected joint and compression along with elevation. The patient will also use NSAID's to aid in recovery. They will follow up if any worsening of symptoms or if no improvement. The patient agrees with and understands the plan of care. All questions have been answered. Advised the patient to call back or return to office if symptoms worsen/change/persist.   Discussed expected course/resolution/complications of diagnosis in detail with patient. Medication risks/benefits/costs/interactions/alternatives discussed with patient. The patient was given an after visit summary which includes diagnoses, current medications, & vitals. They expressed understanding with the diagnosis and plan.

## 2019-08-07 DIAGNOSIS — M10.00 IDIOPATHIC GOUT, UNSPECIFIED CHRONICITY, UNSPECIFIED SITE: Chronic | ICD-10-CM

## 2019-08-07 RX ORDER — INDOMETHACIN 75 MG/1
75 CAPSULE, EXTENDED RELEASE ORAL
Qty: 30 CAP | Refills: 1 | Status: SHIPPED | OUTPATIENT
Start: 2019-08-07 | End: 2019-09-30 | Stop reason: SDUPTHER

## 2019-08-24 ENCOUNTER — HOSPITAL ENCOUNTER (EMERGENCY)
Age: 69
Discharge: HOME | End: 2019-08-24
Attending: EMERGENCY MEDICINE
Payer: MEDICARE

## 2019-08-24 ENCOUNTER — APPOINTMENT (OUTPATIENT)
Dept: CT IMAGING | Age: 69
End: 2019-08-24
Attending: EMERGENCY MEDICINE
Payer: MEDICARE

## 2019-08-24 VITALS
TEMPERATURE: 98.2 F | HEART RATE: 81 BPM | WEIGHT: 304 LBS | SYSTOLIC BLOOD PRESSURE: 121 MMHG | HEIGHT: 73 IN | DIASTOLIC BLOOD PRESSURE: 58 MMHG | BODY MASS INDEX: 40.29 KG/M2 | RESPIRATION RATE: 24 BRPM | OXYGEN SATURATION: 93 %

## 2019-08-24 DIAGNOSIS — N39.0 URINARY TRACT INFECTION WITHOUT HEMATURIA, SITE UNSPECIFIED: ICD-10-CM

## 2019-08-24 DIAGNOSIS — R50.9 FEVER, UNSPECIFIED FEVER CAUSE: Primary | ICD-10-CM

## 2019-08-24 LAB
ALBUMIN SERPL-MCNC: 3.5 G/DL (ref 3.5–5)
ALBUMIN/GLOB SERPL: 0.9 {RATIO} (ref 1.1–2.2)
ALP SERPL-CCNC: 88 U/L (ref 45–117)
ALT SERPL-CCNC: 19 U/L (ref 12–78)
ANION GAP SERPL CALC-SCNC: 10 MMOL/L (ref 5–15)
APPEARANCE UR: ABNORMAL
AST SERPL-CCNC: 20 U/L (ref 15–37)
BACTERIA URNS QL MICRO: ABNORMAL /HPF
BASOPHILS # BLD: 0 K/UL (ref 0–0.1)
BASOPHILS NFR BLD: 0 % (ref 0–1)
BILIRUB SERPL-MCNC: 0.8 MG/DL (ref 0.2–1)
BILIRUB UR QL: NEGATIVE
BUN SERPL-MCNC: 22 MG/DL (ref 6–20)
BUN/CREAT SERPL: 19 (ref 12–20)
CALCIUM SERPL-MCNC: 8.8 MG/DL (ref 8.5–10.1)
CHLORIDE SERPL-SCNC: 99 MMOL/L (ref 97–108)
CO2 SERPL-SCNC: 26 MMOL/L (ref 21–32)
COLOR UR: ABNORMAL
COMMENT, HOLDF: NORMAL
CREAT SERPL-MCNC: 1.17 MG/DL (ref 0.7–1.3)
DIFFERENTIAL METHOD BLD: ABNORMAL
EOSINOPHIL # BLD: 0.1 K/UL (ref 0–0.4)
EOSINOPHIL NFR BLD: 0 % (ref 0–7)
EPITH CASTS URNS QL MICRO: ABNORMAL /LPF
ERYTHROCYTE [DISTWIDTH] IN BLOOD BY AUTOMATED COUNT: 13.1 % (ref 11.5–14.5)
GLOBULIN SER CALC-MCNC: 3.9 G/DL (ref 2–4)
GLUCOSE SERPL-MCNC: 126 MG/DL (ref 65–100)
GLUCOSE UR STRIP.AUTO-MCNC: NEGATIVE MG/DL
HCT VFR BLD AUTO: 39.1 % (ref 36.6–50.3)
HGB BLD-MCNC: 13.6 G/DL (ref 12.1–17)
HGB UR QL STRIP: ABNORMAL
KETONES UR QL STRIP.AUTO: NEGATIVE MG/DL
LACTATE BLD-SCNC: 0.86 MMOL/L (ref 0.4–2)
LEUKOCYTE ESTERASE UR QL STRIP.AUTO: ABNORMAL
LYMPHOCYTES # BLD: 1 K/UL
LYMPHOCYTES NFR BLD: 5 % (ref 12–49)
MCH RBC QN AUTO: 32.4 PG (ref 26–34)
MCHC RBC AUTO-ENTMCNC: 34.8 G/DL (ref 30–36.5)
MCV RBC AUTO: 93.1 FL (ref 80–99)
MONOCYTES # BLD: 1 K/UL (ref 0–1)
MONOCYTES NFR BLD: 6 % (ref 5–13)
NEUTS SEG # BLD: 15.9 K/UL (ref 1.8–8)
NEUTS SEG NFR BLD: 89 % (ref 32–75)
NITRITE UR QL STRIP.AUTO: NEGATIVE
PH UR STRIP: 5.5 [PH] (ref 5–8)
PLATELET # BLD AUTO: 213 K/UL (ref 150–400)
PMV BLD AUTO: 9.8 FL (ref 8.9–12.9)
POTASSIUM SERPL-SCNC: 3.7 MMOL/L (ref 3.5–5.1)
PROT SERPL-MCNC: 7.4 G/DL (ref 6.4–8.2)
PROT UR STRIP-MCNC: ABNORMAL MG/DL
RBC # BLD AUTO: 4.2 M/UL (ref 4.1–5.7)
RBC #/AREA URNS HPF: ABNORMAL /HPF (ref 0–5)
SAMPLES BEING HELD,HOLD: NORMAL
SODIUM SERPL-SCNC: 135 MMOL/L (ref 136–145)
SP GR UR REFRACTOMETRY: 1.01 (ref 1–1.03)
UR CULT HOLD, URHOLD: NORMAL
UROBILINOGEN UR QL STRIP.AUTO: 0.2 EU/DL (ref 0.2–1)
WBC # BLD AUTO: 17.9 K/UL (ref 4.1–11.1)
WBC URNS QL MICRO: ABNORMAL /HPF (ref 0–4)

## 2019-08-24 PROCEDURE — 81001 URINALYSIS AUTO W/SCOPE: CPT

## 2019-08-24 PROCEDURE — 87077 CULTURE AEROBIC IDENTIFY: CPT

## 2019-08-24 PROCEDURE — 85025 COMPLETE CBC W/AUTO DIFF WBC: CPT

## 2019-08-24 PROCEDURE — 36415 COLL VENOUS BLD VENIPUNCTURE: CPT

## 2019-08-24 PROCEDURE — 80053 COMPREHEN METABOLIC PANEL: CPT

## 2019-08-24 PROCEDURE — 74011250636 HC RX REV CODE- 250/636: Performed by: EMERGENCY MEDICINE

## 2019-08-24 PROCEDURE — 87186 SC STD MICRODIL/AGAR DIL: CPT

## 2019-08-24 PROCEDURE — 96365 THER/PROPH/DIAG IV INF INIT: CPT

## 2019-08-24 PROCEDURE — 74011000258 HC RX REV CODE- 258: Performed by: EMERGENCY MEDICINE

## 2019-08-24 PROCEDURE — 74176 CT ABD & PELVIS W/O CONTRAST: CPT

## 2019-08-24 PROCEDURE — 99284 EMERGENCY DEPT VISIT MOD MDM: CPT

## 2019-08-24 PROCEDURE — 83605 ASSAY OF LACTIC ACID: CPT

## 2019-08-24 PROCEDURE — 87086 URINE CULTURE/COLONY COUNT: CPT

## 2019-08-24 RX ORDER — CEPHALEXIN 500 MG/1
500 CAPSULE ORAL 3 TIMES DAILY
Qty: 21 CAP | Refills: 0 | Status: SHIPPED | OUTPATIENT
Start: 2019-08-24 | End: 2019-08-31

## 2019-08-24 RX ORDER — SODIUM CHLORIDE 9 MG/ML
INJECTION, SOLUTION INTRAVENOUS
Status: DISCONTINUED
Start: 2019-08-24 | End: 2019-08-24 | Stop reason: HOSPADM

## 2019-08-24 RX ORDER — SODIUM CHLORIDE 0.9 % (FLUSH) 0.9 %
5-40 SYRINGE (ML) INJECTION EVERY 8 HOURS
Status: DISCONTINUED | OUTPATIENT
Start: 2019-08-24 | End: 2019-08-24 | Stop reason: HOSPADM

## 2019-08-24 RX ORDER — CEFTRIAXONE 1 G/1
INJECTION, POWDER, FOR SOLUTION INTRAMUSCULAR; INTRAVENOUS
Status: DISCONTINUED
Start: 2019-08-24 | End: 2019-08-24 | Stop reason: HOSPADM

## 2019-08-24 RX ORDER — SODIUM CHLORIDE 0.9 % (FLUSH) 0.9 %
5-40 SYRINGE (ML) INJECTION AS NEEDED
Status: DISCONTINUED | OUTPATIENT
Start: 2019-08-24 | End: 2019-08-24 | Stop reason: HOSPADM

## 2019-08-24 RX ADMIN — CEFTRIAXONE SODIUM 1 G: 1 INJECTION, POWDER, FOR SOLUTION INTRAMUSCULAR; INTRAVENOUS at 04:36

## 2019-08-24 NOTE — ED PROVIDER NOTES
History hypertension, hyperlipidemia, gout, melanoma, kidney stones. He presents with a 2-day history of dysuria, urinary frequency, and fever to 101 prior to arrival.  He also reports a mildly decreased appetite and body aches. He denies fatigue. No abdominal pain but he does report some mild right flank pain over the last 5 hours. He states he has an almost constant urge to urinate. He says that he has a remote history of urinary tract infections. He reports taking Tylenol about 6-1/2 hours ago.            Past Medical History:   Diagnosis Date    Cancer ([address]     melanoma     Gout     Gout     Hypercholesterolemia     Hypertension     Skin cancer     melanoma (left post shoulder) and BCC x 2 (scalp, right popliteal)    Sun-damaged skin     Thyroid disease        Past Surgical History:   Procedure Laterality Date    COLONOSCOPY N/A 5/29/2018    COLONOSCOPY performed by Desi Lopez MD at [facility] ENDOSCOPY    HX HEENT Left     partial thyroidectomy    HX ORTHOPAEDIC  1969    Left knee ACL     HX ORTHOPAEDIC  1979    dupytrens contractures    HX THYROIDECTOMY  05/2013    HX TONSILLECTOMY  1950    SKIN TISSUE PROCEDURE UNLISTED           Family History:   Problem Relation Age of Onset    Diabetes Mother     Heart Disease Mother         heart valve replaced    Hypertension Father     Colon Polyps Father     Cancer Brother         Stomach        Social History     Socioeconomic History    Marital status:      Spouse name: Not on file    Number of children: Not on file    Years of education: Not on file    Highest education level: Not on file   Occupational History    Not on file   Social Needs    Financial resource strain: Not on file    Food insecurity:     Worry: Not on file     Inability: Not on file    Transportation needs:     Medical: Not on file     Non-medical: Not on file   Tobacco Use    Smoking status: Never Smoker    Smokeless tobacco: Never Used   Substance and Sexual Activity    Alcohol use: Yes     Alcohol/week: 1.0 standard drinks     Types: 1 Cans of beer per week    Drug use: No    Sexual activity: Not Currently     Partners: Female   Lifestyle    Physical activity:     Days per week: Not on file     Minutes per session: Not on file    Stress: Not on file   Relationships    Social connections:     Talks on phone: Not on file     Gets together: Not on file     Attends Jewish service: Not on file     Active member of club or organization: Not on file     Attends meetings of clubs or organizations: Not on file     Relationship status: Not on file    Intimate partner violence:     Fear of current or ex partner: Not on file     Emotionally abused: Not on file     Physically abused: Not on file     Forced sexual activity: Not on file   Other Topics Concern    Not on file   Social History Narrative    Not on file         ALLERGIES: Patient has no known allergies. Review of Systems   All other systems reviewed and are negative. Vitals:    08/24/19 0318   BP: 130/65   Pulse: 88   Resp: 18   Temp: 98.2 °F (36.8 °C)   SpO2: 96%   Weight: 137.9 kg (304 lb)   Height: 6' 1\" (1.854 m)            Physical Exam   Constitutional: He appears well-developed and well-nourished. obese   HENT:   Head: Normocephalic and atraumatic. Eyes: Conjunctivae are normal.   Neck: Neck supple. No tracheal deviation present. Cardiovascular: Normal rate, regular rhythm, normal heart sounds and intact distal pulses. Exam reveals no gallop and no friction rub. No murmur heard. Pulmonary/Chest: Effort normal and breath sounds normal.   Abdominal: Soft. There is no tenderness. Musculoskeletal: He exhibits no edema or deformity. Neurological: He is alert. oriented   Skin: Skin is warm and dry. Psychiatric: He has a normal mood and affect. Nursing note and vitals reviewed.        MDM       Procedures    Progress Note:  Results, treatment, and follow up plan have been discussed with patient/wife. Questions were answered. Wilber Alpers, MD  4:23 AM    A/P: Fever, dysuria - urinary tract infection; reassuring appearance and exam; VSS; white blood cell count equals 18, UA shows  white blood cells; chemistries unremarkable; lactate normal; Rocephin in the ED and Keflex for home. Close PCP follow-up.

## 2019-08-24 NOTE — DISCHARGE INSTRUCTIONS
Patient Education        Urinary Tract Infections in Men: Care Instructions  Your Care Instructions    A urinary tract infection, or UTI, is a general term for an infection anywhere between the kidneys and the tip of the penis. UTIs can also be a result of a prostate problem. Most cause pain or burning when you urinate. Most UTIs are caused by bacteria and can be cured with antibiotics. It is important to complete your treatment so that the infection does not get worse. Follow-up care is a key part of your treatment and safety. Be sure to make and go to all appointments, and call your doctor if you are having problems. It's also a good idea to know your test results and keep a list of the medicines you take. How can you care for yourself at home? · Take your antibiotics as prescribed. Do not stop taking them just because you feel better. You need to take the full course of antibiotics. · Take your medicines exactly as prescribed. Your doctor may have prescribed a medicine, such as phenazopyridine (Pyridium), to help relieve pain when you urinate. This turns your urine orange. You may stop taking it when your symptoms get better. But be sure to take all of your antibiotics, which treat the infection. · Drink extra water for the next day or two. This will help make the urine less concentrated and help wash out the bacteria causing the infection. (If you have kidney, heart, or liver disease and have to limit your fluids, talk with your doctor before you increase your fluid intake.)  · Avoid drinks that are carbonated or have caffeine. They can irritate the bladder. · Urinate often. Try to empty your bladder each time. · To relieve pain, take a hot bath or lay a heating pad (set on low) over your lower belly or genital area. Never go to sleep with a heating pad in place. To help prevent UTIs  · Drink plenty of fluids, enough so that your urine is light yellow or clear like water.  If you have kidney, heart, or liver disease and have to limit fluids, talk with your doctor before you increase the amount of fluids you drink. · Urinate when you have the urge. Do not hold your urine for a long time. Urinate before you go to sleep. · Keep your penis clean. Catheter care  If you have a drainage tube (catheter) in place, the following steps will help you care for it. · Always wash your hands before and after touching your catheter. · Check the area around the urethra for inflammation or signs of infection. Signs of infection include irritated, swollen, red, or tender skin, or pus around the catheter. · Clean the area around the catheter with soap and water two times a day. Dry with a clean towel afterward. · Do not apply powder or lotion to the skin around the catheter. To empty the urine collection bag  · Wash your hands with soap and water. · Without touching the drain spout, remove the spout from its sleeve at the bottom of the collection bag. Open the valve on the spout. · Let the urine flow out of the bag and into the toilet or a container. Do not let the tubing or drain spout touch anything. · After you empty the bag, clean the end of the drain spout with tissue and water. Close the valve and put the drain spout back into its sleeve at the bottom of the collection bag. · Wash your hands with soap and water. When should you call for help? Call your doctor now or seek immediate medical care if:    · Symptoms such as a fever, chills, nausea, or vomiting get worse or happen for the first time.     · You have new pain in your back just below your rib cage. This is called flank pain.     · There is new blood or pus in your urine.     · You are not able to take or keep down your antibiotics.    Watch closely for changes in your health, and be sure to contact your doctor if:    · You are not getting better after taking an antibiotic for 2 days.     · Your symptoms go away but then come back.    Where can you learn more?  Go to http://juliana-samy.info/. Enter E596 in the search box to learn more about \"Urinary Tract Infections in Men: Care Instructions. \"  Current as of: December 19, 2018  Content Version: 12.1  © 7613-9590 [employer]. Care instructions adapted under license by [employer] (which disclaims liability or warranty for this information). If you have questions about a medical condition or this instruction, always ask your healthcare professional. [place] [address] any warranty or liability for your use of this information.

## 2019-08-24 NOTE — ED NOTES
The patient was discharged home by Dr. Isreal Xiong in stable condition. The patient is alert and oriented, in no respiratory distress and discharge vital signs obtained. The patient's diagnosis, condition and treatment were explained. The patient expressed understanding. A discharge plan has been developed. Aftercare instructions were given. Pt ambulatory out of the ED.

## 2019-08-24 NOTE — ED NOTES
Patient medicated as advised by Dr. Fely Funez. Patient denies any increase in symptoms at this time. Patient eager to be discharged home.

## 2019-08-24 NOTE — ED NOTES
Patient resting at bedside with his wife. Patient reports fever with urinary symptoms. Patient reports taking Tylenol around 9 p.m. Call bell within reach. Patient ambulatory to restroom.

## 2019-08-26 ENCOUNTER — OFFICE VISIT (OUTPATIENT)
Dept: INTERNAL MEDICINE CLINIC | Age: 69
End: 2019-08-26

## 2019-08-26 VITALS
HEART RATE: 62 BPM | WEIGHT: 304.6 LBS | DIASTOLIC BLOOD PRESSURE: 60 MMHG | SYSTOLIC BLOOD PRESSURE: 90 MMHG | OXYGEN SATURATION: 96 % | BODY MASS INDEX: 40.37 KG/M2 | RESPIRATION RATE: 17 BRPM | TEMPERATURE: 97.9 F | HEIGHT: 73 IN

## 2019-08-26 DIAGNOSIS — N40.0 PROSTATE ENLARGEMENT: Primary | ICD-10-CM

## 2019-08-26 DIAGNOSIS — N28.9 RENAL LESION: ICD-10-CM

## 2019-08-26 DIAGNOSIS — N41.0 ACUTE PROSTATITIS: ICD-10-CM

## 2019-08-26 LAB
BACTERIA SPEC CULT: ABNORMAL
CC UR VC: ABNORMAL
SERVICE CMNT-IMP: ABNORMAL

## 2019-08-26 NOTE — PROGRESS NOTES
Follow Up Visit    Gemma Song is a 79 y.o. male. he presents for ED Follow-up    The patient was seen in the ED and was diagnosed with prostatitis. He previously had been having difficulty urinating. He then developed pain with urination and eventually fever. He presented to the ED with these issues. There he had a CT scan which appeared to show prostatitis. He also had a lesion on his right kidney which appeared to be a cyst but had a denser appearance. We discussed the need to follow up with urology for this. He also was given antibiotics for prostatitis. He is on day 3 of 7. Feeling somewhat better today. No further fevers. The pain has improved. Patient Active Problem List   Diagnosis Code    Hypertension I10    Hyperlipidemia E78.5    Gout M10.9    Multinodular goiter E04.2    Melanoma ([facility] [address] C43.9    Hx of adenomatous colonic polyps Z86.010    Obesity, morbid ([facility] [address] E66.01         Prior to Admission medications    Medication Sig Start Date End Date Taking? Authorizing Provider   cephALEXin (KEFLEX) 500 mg capsule Take 1 Cap by mouth three (3) times daily for 7 days. 8/24/19 8/31/19 Yes Vivek Ramirez MD   verapamil ER (CALAN-SR) 240 mg CR tablet TAKE 1 TABLET BY MOUTH TWICE A DAY 7/23/19  Yes Ron Pabon MD   valsartan (DIOVAN) 160 mg tablet Take 1 Tab by mouth daily. 5/22/19  Yes Ron Pabon MD   atorvastatin (LIPITOR) 10 mg tablet Take 1 Tab by mouth daily. 5/20/19  Yes Ron Pabon MD   ketoconazole (NIZORAL) 2 % topical cream Apply  to affected area two (2) times a day. 3/13/19  Yes Freddy GRECO NP   cholecalciferol, vitamin D3, 2,000 unit tab Take  by mouth. Yes Provider, Historical   Omega-3 Fatty Acids 300 mg cap Take  by mouth. Yes Provider, Historical   calcium 500 mg tab Take  by mouth. With potassium   Yes Provider, Historical   magnesium 250 mg tab Take 400 mg by mouth daily.    Yes Provider, Historical   indomethacin SR (INDOCIN SR) 75 mg SR capsule TAKE 1 CAP BY MOUTH DAILY AS NEEDED (GOUT FLARE). 8/7/19   Hernesto Martinez MD   allopurinol (ZYLOPRIM) 300 mg tablet Take 1 Tab by mouth daily. Patient taking differently: Take 300 mg by mouth daily as needed. 5/20/19   Hernesto Martinez MD         Health Maintenance   Topic Date Due    GLAUCOMA SCREENING Q2Y  10/23/2017    MEDICARE YEARLY EXAM  03/14/2019    Influenza Age 5 to Adult  08/01/2019    COLONOSCOPY  05/29/2023    DTaP/Tdap/Td series (2 - Td) 11/24/2024    Hepatitis C Screening  Completed    Shingrix Vaccine Age 50>  Completed    Pneumococcal 65+ years  Completed       Review of Systems   Constitutional: Negative. Respiratory: Negative. Cardiovascular: Negative. Gastrointestinal: Negative. Visit Vitals  BP 90/60 (BP 1 Location: Right arm, BP Patient Position: Sitting)   Pulse 62   Temp 97.9 °F (36.6 °C) (Oral)   Resp 17   Ht 6' 1\" (1.854 m)   Wt 304 lb 9.6 oz (138.2 kg)   SpO2 96%   BMI 40.19 kg/m²       Physical Exam   Constitutional: No distress. Cardiovascular: Normal rate and regular rhythm. Pulmonary/Chest: Effort normal and breath sounds normal.         ASSESSMENT/PLAN    Diagnoses and all orders for this visit:    1. Prostate enlargement - Pt has BPH it appears. Will await resolution of prostatitis to determine severity. Also will defer management to urology. 2. Renal lesion  -     REFERRAL TO UROLOGY    3. Acute prostatitis    Follow-up and Dispositions    · Return if symptoms worsen or fail to improve.

## 2019-09-04 ENCOUNTER — OFFICE VISIT (OUTPATIENT)
Dept: INTERNAL MEDICINE CLINIC | Age: 69
End: 2019-09-04

## 2019-09-04 ENCOUNTER — HOSPITAL ENCOUNTER (OUTPATIENT)
Dept: LAB | Age: 69
Discharge: HOME | End: 2019-09-04
Payer: MEDICARE

## 2019-09-04 VITALS
DIASTOLIC BLOOD PRESSURE: 60 MMHG | HEART RATE: 65 BPM | WEIGHT: 301.2 LBS | OXYGEN SATURATION: 97 % | TEMPERATURE: 98.4 F | BODY MASS INDEX: 39.92 KG/M2 | HEIGHT: 73 IN | SYSTOLIC BLOOD PRESSURE: 99 MMHG | RESPIRATION RATE: 19 BRPM

## 2019-09-04 DIAGNOSIS — R82.81 PYURIA: ICD-10-CM

## 2019-09-04 DIAGNOSIS — R35.0 URINARY FREQUENCY: Primary | ICD-10-CM

## 2019-09-04 LAB
BILIRUB UR QL STRIP: NEGATIVE
GLUCOSE UR-MCNC: NEGATIVE MG/DL
KETONES P FAST UR STRIP-MCNC: NEGATIVE MG/DL
PH UR STRIP: 6 [PH] (ref 4.6–8)
PROT UR QL STRIP: NORMAL
SP GR UR STRIP: 1.03 (ref 1–1.03)
UA UROBILINOGEN AMB POC: NORMAL (ref 0.2–1)
URINALYSIS CLARITY POC: NORMAL
URINALYSIS COLOR POC: NORMAL
URINE BLOOD POC: NORMAL
URINE LEUKOCYTES POC: NORMAL
URINE NITRITES POC: POSITIVE

## 2019-09-04 PROCEDURE — 87086 URINE CULTURE/COLONY COUNT: CPT

## 2019-09-04 PROCEDURE — 87186 SC STD MICRODIL/AGAR DIL: CPT

## 2019-09-04 PROCEDURE — 87077 CULTURE AEROBIC IDENTIFY: CPT

## 2019-09-04 PROCEDURE — 87088 URINE BACTERIA CULTURE: CPT

## 2019-09-04 RX ORDER — CIPROFLOXACIN 500 MG/1
500 TABLET ORAL 2 TIMES DAILY
Qty: 14 TAB | Refills: 0 | Status: SHIPPED | OUTPATIENT
Start: 2019-09-04 | End: 2019-09-11

## 2019-09-04 NOTE — PROGRESS NOTES
Acute Care Note    Tatyana Watts is 79 y.o. male. he presents for evaluation of Urinary Retention and Urinary Burning    The patient presents with ongoing issues of urinary retention and burning with urination. He was seen and treated last week for a UTI which caused fever and dysuria. He has since had some modest improvement in his symptoms but now has felt somewhat worse. He also notes that he cannot urinate while sitting down and has to stand to urinate. He feels that when he sits, he cannot \"push the urine out\". He denies current fevers or chills. Prior to Admission medications    Medication Sig Start Date End Date Taking? Authorizing Provider   ciprofloxacin HCl (CIPRO) 500 mg tablet Take 1 Tab by mouth two (2) times a day for 7 days. 9/4/19 9/11/19 Yes Avelina Johnson MD   indomethacin SR (INDOCIN SR) 75 mg SR capsule TAKE 1 CAP BY MOUTH DAILY AS NEEDED (GOUT FLARE). 8/7/19  Yes Avelina Johnson MD   verapamil ER (CALAN-SR) 240 mg CR tablet TAKE 1 TABLET BY MOUTH TWICE A DAY 7/23/19  Yes Avelina Johnson MD   valsartan (DIOVAN) 160 mg tablet Take 1 Tab by mouth daily. 5/22/19  Yes Avelina Johnson MD   allopurinol (ZYLOPRIM) 300 mg tablet Take 1 Tab by mouth daily. Patient taking differently: Take 300 mg by mouth daily as needed. 5/20/19  Yes Avelina Johnson MD   atorvastatin (LIPITOR) 10 mg tablet Take 1 Tab by mouth daily. 5/20/19  Yes Avelina Johnson MD   ketoconazole (NIZORAL) 2 % topical cream Apply  to affected area two (2) times a day. 3/13/19  Yes Zulay GRECO NP   cholecalciferol, vitamin D3, 2,000 unit tab Take  by mouth. Yes Provider, Historical   Omega-3 Fatty Acids 300 mg cap Take  by mouth. Yes Provider, Historical   calcium 500 mg tab Take  by mouth. With potassium   Yes Provider, Historical   magnesium 250 mg tab Take 400 mg by mouth daily.    Yes Provider, Historical         Patient Active Problem List   Diagnosis Code    Hypertension I10    Hyperlipidemia E78.5    Gout M10.9    Multinodular goiter E04.2    Melanoma (HCC) C43.9    Hx of adenomatous colonic polyps Z86.010    Obesity, morbid (HCC) E66.01         Review of Systems   Constitutional: Negative for chills, fever and malaise/fatigue. Genitourinary: Positive for dysuria and frequency. Negative for flank pain. Visit Vitals  BP 99/60 (BP 1 Location: Right arm, BP Patient Position: Sitting)   Pulse 65   Temp 98.4 °F (36.9 °C) (Oral)   Resp 19   Ht 6' 1\" (1.854 m)   Wt 301 lb 3.2 oz (136.6 kg)   SpO2 97%   BMI 39.74 kg/m²       Physical Exam   Constitutional: No distress. Cardiovascular: Normal rate and regular rhythm. Abdominal: Soft. He exhibits no distension. There is no tenderness. Results for orders placed or performed in visit on 09/04/19   AMB POC URINALYSIS DIP STICK AUTO W/O MICRO     Status: None   Result Value Ref Range Status    Color (UA POC) Ailyn  Final    Clarity (UA POC) Turbid  Final    Glucose (UA POC) Negative Negative Final    Bilirubin (UA POC) Negative Negative Final    Ketones (UA POC) Negative Negative Final    Specific gravity (UA POC) 1.030 1.001 - 1.035 Final    Blood (UA POC) 2+ Negative Final    pH (UA POC) 6.0 4.6 - 8.0 Final    Protein (UA POC) 2+ Negative Final    Urobilinogen (UA POC) 0.2 mg/dL 0.2 - 1 Final    Nitrites (UA POC) Positive Negative Final    Leukocyte esterase (UA POC) 1+ Negative Final           ASSESSMENT/PLAN  Diagnoses and all orders for this visit:    1. Urinary frequency -the patient's urinalysis shows ongoing issues regarding positive nitrite and leukocyte esterase. I am concerned that he may have some nidus of infection causing this either his prostate or some other bladder or urethral lesion.  -     AMB POC URINALYSIS DIP STICK AUTO W/O MICRO    2. Pyuria -given the ongoing symptom and abnormal urinalysis, we will begin ciprofloxacin and culture the urine again today.   We will also contact urology to see if the patient could be seen sooner than his September 10 appointment. -     ciprofloxacin HCl (CIPRO) 500 mg tablet; Take 1 Tab by mouth two (2) times a day for 7 days. -     CULTURE, URINE         Advised the patient to call back or return to office if symptoms worsen/change/persist.   Discussed expected course/resolution/complications of diagnosis in detail with patient. Medication risks/benefits/costs/interactions/alternatives discussed with patient. The patient was given an after visit summary which includes diagnoses, current medications, & vitals. They expressed understanding with the diagnosis and plan.

## 2019-09-04 NOTE — Clinical Note
Can we call Dr. Kaylynn Kennedy office to see if they can see Mr. Chandler Fallow sooner than September 10? Thanks!

## 2019-09-06 LAB — BACTERIA UR CULT: ABNORMAL

## 2019-09-25 ENCOUNTER — TELEPHONE (OUTPATIENT)
Dept: INTERNAL MEDICINE CLINIC | Age: 69
End: 2019-09-25

## 2019-09-27 ENCOUNTER — HOSPITAL ENCOUNTER (OUTPATIENT)
Dept: LAB | Age: 69
Discharge: HOME | End: 2019-09-27
Payer: MEDICARE

## 2019-09-27 PROCEDURE — 84550 ASSAY OF BLOOD/URIC ACID: CPT

## 2019-09-27 PROCEDURE — 85025 COMPLETE CBC W/AUTO DIFF WBC: CPT

## 2019-09-27 PROCEDURE — 83036 HEMOGLOBIN GLYCOSYLATED A1C: CPT

## 2019-09-27 PROCEDURE — 80061 LIPID PANEL: CPT

## 2019-09-27 PROCEDURE — 36415 COLL VENOUS BLD VENIPUNCTURE: CPT

## 2019-09-27 PROCEDURE — 84153 ASSAY OF PSA TOTAL: CPT

## 2019-09-27 PROCEDURE — 80053 COMPREHEN METABOLIC PANEL: CPT

## 2019-09-28 LAB
ALBUMIN SERPL-MCNC: 4.3 G/DL (ref 3.6–4.8)
ALBUMIN/GLOB SERPL: 2 {RATIO} (ref 1.2–2.2)
ALP SERPL-CCNC: 74 IU/L (ref 39–117)
ALT SERPL-CCNC: 16 IU/L (ref 0–44)
AST SERPL-CCNC: 19 IU/L (ref 0–40)
BASOPHILS # BLD AUTO: 0.1 X10E3/UL (ref 0–0.2)
BASOPHILS NFR BLD AUTO: 1 %
BILIRUB SERPL-MCNC: 0.9 MG/DL (ref 0–1.2)
BUN SERPL-MCNC: 23 MG/DL (ref 8–27)
BUN/CREAT SERPL: 19 (ref 10–24)
CALCIUM SERPL-MCNC: 8.9 MG/DL (ref 8.6–10.2)
CHLORIDE SERPL-SCNC: 100 MMOL/L (ref 96–106)
CHOLEST SERPL-MCNC: 121 MG/DL (ref 100–199)
CO2 SERPL-SCNC: 21 MMOL/L (ref 20–29)
CREAT SERPL-MCNC: 1.21 MG/DL (ref 0.76–1.27)
EOSINOPHIL # BLD AUTO: 0.2 X10E3/UL (ref 0–0.4)
EOSINOPHIL NFR BLD AUTO: 4 %
ERYTHROCYTE [DISTWIDTH] IN BLOOD BY AUTOMATED COUNT: 14.4 % (ref 12.3–15.4)
EST. AVERAGE GLUCOSE BLD GHB EST-MCNC: 117 MG/DL
GLOBULIN SER CALC-MCNC: 2.2 G/DL (ref 1.5–4.5)
GLUCOSE SERPL-MCNC: 86 MG/DL (ref 65–99)
HBA1C MFR BLD: 5.7 % (ref 4.8–5.6)
HCT VFR BLD AUTO: 39.2 % (ref 37.5–51)
HDLC SERPL-MCNC: 54 MG/DL
HGB BLD-MCNC: 12.9 G/DL (ref 13–17.7)
IMM GRANULOCYTES # BLD AUTO: 0 X10E3/UL (ref 0–0.1)
IMM GRANULOCYTES NFR BLD AUTO: 0 %
LDLC SERPL CALC-MCNC: 58 MG/DL (ref 0–99)
LYMPHOCYTES # BLD AUTO: 1.6 X10E3/UL (ref 0.7–3.1)
LYMPHOCYTES NFR BLD AUTO: 29 %
MCH RBC QN AUTO: 30.7 PG (ref 26.6–33)
MCHC RBC AUTO-ENTMCNC: 32.9 G/DL (ref 31.5–35.7)
MCV RBC AUTO: 93 FL (ref 79–97)
MONOCYTES # BLD AUTO: 0.5 X10E3/UL (ref 0.1–0.9)
MONOCYTES NFR BLD AUTO: 8 %
NEUTROPHILS # BLD AUTO: 3.3 X10E3/UL (ref 1.4–7)
NEUTROPHILS NFR BLD AUTO: 58 %
PLATELET # BLD AUTO: 231 X10E3/UL (ref 150–450)
POTASSIUM SERPL-SCNC: 4.3 MMOL/L (ref 3.5–5.2)
PROT SERPL-MCNC: 6.5 G/DL (ref 6–8.5)
PSA SERPL-MCNC: 1.3 NG/ML (ref 0–4)
RBC # BLD AUTO: 4.2 X10E6/UL (ref 4.14–5.8)
SODIUM SERPL-SCNC: 137 MMOL/L (ref 134–144)
TRIGL SERPL-MCNC: 44 MG/DL (ref 0–149)
URATE SERPL-MCNC: 6.6 MG/DL (ref 3.7–8.6)
VLDLC SERPL CALC-MCNC: 9 MG/DL (ref 5–40)
WBC # BLD AUTO: 5.6 X10E3/UL (ref 3.4–10.8)

## 2019-09-30 DIAGNOSIS — M10.00 IDIOPATHIC GOUT, UNSPECIFIED CHRONICITY, UNSPECIFIED SITE: Chronic | ICD-10-CM

## 2019-10-02 RX ORDER — INDOMETHACIN 75 MG/1
75 CAPSULE, EXTENDED RELEASE ORAL
Qty: 30 CAP | Refills: 1 | Status: SHIPPED | OUTPATIENT
Start: 2019-10-02 | End: 2019-10-27 | Stop reason: SDUPTHER

## 2019-10-07 ENCOUNTER — OFFICE VISIT (OUTPATIENT)
Dept: INTERNAL MEDICINE CLINIC | Age: 69
End: 2019-10-07

## 2019-10-07 VITALS
HEIGHT: 73 IN | WEIGHT: 304 LBS | OXYGEN SATURATION: 97 % | TEMPERATURE: 98.4 F | SYSTOLIC BLOOD PRESSURE: 111 MMHG | RESPIRATION RATE: 18 BRPM | DIASTOLIC BLOOD PRESSURE: 70 MMHG | HEART RATE: 61 BPM | BODY MASS INDEX: 40.29 KG/M2

## 2019-10-07 DIAGNOSIS — Z00.00 MEDICARE ANNUAL WELLNESS VISIT, SUBSEQUENT: Primary | ICD-10-CM

## 2019-10-07 DIAGNOSIS — Z23 ENCOUNTER FOR IMMUNIZATION: ICD-10-CM

## 2019-10-07 DIAGNOSIS — Q61.02 MULTIPLE RENAL CYSTS: ICD-10-CM

## 2019-10-07 RX ORDER — MIRABEGRON 25 MG/1
25 TABLET, FILM COATED, EXTENDED RELEASE ORAL DAILY
COMMUNITY
Start: 2019-09-26

## 2019-10-07 RX ORDER — TAMSULOSIN HYDROCHLORIDE 0.4 MG/1
0.4 CAPSULE ORAL DAILY
Refills: 3 | COMMUNITY
Start: 2019-09-05

## 2019-10-07 RX ORDER — CHLORHEXIDINE GLUCONATE 1.2 MG/ML
RINSE ORAL AS NEEDED
COMMUNITY
Start: 2019-10-01 | End: 2021-05-10 | Stop reason: ALTCHOICE

## 2019-10-07 NOTE — PATIENT INSTRUCTIONS
Vaccine Information Statement Influenza (Flu) Vaccine (Inactivated or Recombinant): What You Need to Know Many Vaccine Information Statements are available in [place] and other languages. See www.immunize.org/vis Hojas de información sobre vacunas están disponibles en español y en muchos otros idiomas. Visite www.immunize.org/vis 1. Why get vaccinated? Influenza vaccine can prevent influenza (flu). Flu is a contagious disease that spreads around the [place] [facility] every year, usually between October and May. Anyone can get the flu, but it is more dangerous for some people. Infants and young children, people 72years of age and older, pregnant women, and people with certain health conditions or a weakened immune system are at greatest risk of flu complications. Pneumonia, bronchitis, sinus infections and ear infections are examples of flu-related complications. If you have a medical condition, such as heart disease, cancer or diabetes, flu can make it worse. Flu can cause fever and chills, sore throat, muscle aches, fatigue, cough, headache, and runny or stuffy nose. Some people may have vomiting and diarrhea, though this is more common in children than adults. Each year thousands of people in the [facility] die from flu, and many more are hospitalized. Flu vaccine prevents millions of illnesses and flu-related visits to the doctor each year. 2. Influenza vaccines CDC recommends everyone 10months of age and older get vaccinated every flu season. Children 6 months through 6years of age may need 2 doses during a single flu season. Everyone else needs only 1 dose each flu season. It takes about 2 weeks for protection to develop after vaccination. There are many flu viruses, and they are always changing. Each year a new flu vaccine is made to protect against three or four viruses that are likely to cause disease in the upcoming flu season.  Even when the vaccine doesnt exactly match these viruses, it may still provide some protection. Influenza vaccine does not cause flu. Influenza vaccine may be given at the same time as other vaccines. 3. Talk with your health care provider Tell your vaccine provider if the person getting the vaccine: 
 Has had an allergic reaction after a previous dose of influenza vaccine, or has any severe, life-threatening allergies.  Has ever had Guillain-Barré Syndrome (also called GBS). In some cases, your health care provider may decide to postpone influenza vaccination to a future visit. People with minor illnesses, such as a cold, may be vaccinated. People who are moderately or severely ill should usually wait until they recover before getting influenza vaccine. Your health care provider can give you more information. 4. Risks of a reaction  Soreness, redness, and swelling where shot is given, fever, muscle aches, and headache can happen after influenza vaccine.  There may be a very small increased risk of Guillain-Barré Syndrome (GBS) after inactivated influenza vaccine (the flu shot). [facility] children who get the flu shot along with pneumococcal vaccine (PCV13), and/or DTaP vaccine at the same time might be slightly more likely to have a seizure caused by fever. Tell your health care provider if a child who is getting flu vaccine has ever had a seizure. People sometimes faint after medical procedures, including vaccination. Tell your provider if you feel dizzy or have vision changes or ringing in the ears. As with any medicine, there is a very remote chance of a vaccine causing a severe allergic reaction, other serious injury, or death. 5. What if there is a serious problem? An allergic reaction could occur after the vaccinated person leaves the clinic.  If you see signs of a severe allergic reaction (hives, swelling of the face and throat, difficulty breathing, a fast heartbeat, dizziness, or weakness), call 9-1-1 and get the person to the nearest hospital. 
 
For other signs that concern you, call your health care provider. Adverse reactions should be reported to the Vaccine Adverse Event Reporting System (VAERS). Your health care provider will usually file this report, or you can do it yourself. Visit the VAERS website at www.vaers. hhs.gov or call 1-888.556.9624. VAERS is only for reporting reactions, and VAERS staff do not give medical advice. 6. The National Vaccine Injury Compensation Program 
 
The [facility], located within [facility] Vaccine Injury Compensation Program (VICP) is a federal program that was created to compensate people who may have been injured by certain vaccines. Visit the VICP website at www.[facility].gov/vaccinecompensation or call 8-669.622.4673 to learn about the program and about filing a claim. There is a time limit to file a claim for compensation. 7. How can I learn more?  Ask your health care provider.  Call your local or state health department.  Contact the Centers for Disease Control and Prevention (CDC): 
- Call 8-290.658.2451 (4-493-KQS-INFO) or 
- Visit CDCs influenza website at www.cdc.gov/flu Vaccine Information Statement (Interim) Inactivated Influenza Vaccine 8/15/2019 
[address] CELIA Castro 549CC-86 [facility] Centers for Disease Control and Prevention Office Use Only Medicare Wellness Visit, Male The best way to live healthy is to have a lifestyle where you eat a well-balanced diet, exercise regularly, limit alcohol use, and quit all forms of tobacco/nicotine, if applicable. Regular preventive services are another way to keep healthy. Preventive services (vaccines, screening tests, monitoring & exams) can help personalize your care plan, which helps you manage your own care. Screening tests can find health problems at the earliest stages, when they are easiest to treat. [address] follows the current, evidence-based guidelines published by the [facility] States Brodie Lantigua ([facility]) when recommending preventive services for our patients. Because we follow these guidelines, sometimes recommendations change over time as research supports it. (For example, a prostate screening blood test is no longer routinely recommended for men with no symptoms.) Of course, you and your doctor may decide to screen more often for some diseases, based on your risk and co-morbidities (chronic disease you are already diagnosed with). Preventive services for you include: - Medicare offers their members a free annual wellness visit, which is time for you and your primary care provider to discuss and plan for your preventive service needs. Take advantage of this benefit every year! 
-All adults over age 72 should receive the recommended pneumonia vaccines. Current USPSTF guidelines recommend a series of two vaccines for the best pneumonia protection.  
-All adults should have a flu vaccine yearly and an ECG. All adults age 61 and older should receive a shingles vaccine once in their lifetime.   
-All adults age 38-68 who are overweight should have a diabetes screening test once every three years.  
-Other screening tests & preventive services for persons with diabetes include: an eye exam to screen for diabetic retinopathy, a kidney function test, a foot exam, and stricter control over your cholesterol.  
-Cardiovascular screening for adults with routine risk involves an electrocardiogram (ECG) at intervals determined by the provider.  
-Colorectal cancer screening should be done for adults age 54-65 with no increased risk factors for colorectal cancer. There are a number of acceptable methods of screening for this type of cancer. Each test has its own benefits and drawbacks.  Discuss with your provider what is most appropriate for you during your annual wellness visit. The different tests include: colonoscopy (considered the best screening method), a fecal occult blood test, a fecal DNA test, and sigmoidoscopy. 
-All adults born between [facility] should be screened once for Hepatitis C. 
-An Abdominal Aortic Aneurysm (AAA) Screening is recommended for men age 73-68 who has ever smoked in their lifetime. Here is a list of your current Health Maintenance items (your personalized list of preventive services) with a due date: 
Health Maintenance Due Topic Date Due  Glaucoma Screening   10/23/2017 [address] [place] [address] Annual Well Visit  03/14/2019  Flu Vaccine  08/01/2019

## 2019-10-07 NOTE — PROGRESS NOTES
Marlene Vallecillo is a 79 y.o. male who presents for routine immunizations. He denies any symptoms , reactions or allergies that would exclude them from being immunized today. Risks and adverse reactions were discussed and the VIS was given to them. All questions were addressed. He was observed for 5 min post injection. There were no reactions observed.     Ivette Pacheco

## 2019-10-07 NOTE — PROGRESS NOTES
This is the Subsequent Medicare Annual Wellness Exam, performed 12 months or more after the Initial AWV or the last Subsequent AWV    I have reviewed the patient's medical history in detail and updated the computerized patient record. The patient has seen Urology- antibiotics were extended and he was found to have renal cysts. These will be monitored for now. He feels better and it appears that this UTI is resolving. History     Past Medical History:   Diagnosis Date    Cancer ([address]     melanoma     Gout     Gout     Hypercholesterolemia     Hypertension     Skin cancer     melanoma (left post shoulder) and BCC x 2 (scalp, right popliteal)    Sun-damaged skin     Thyroid disease       Past Surgical History:   Procedure Laterality Date    COLONOSCOPY N/A 5/29/2018    COL[facility]ish [facility]    HX HEENT Left     partial thyroidectomy    HX ORTHOPAEDIC  1969    Left knee ACL     HX ORTHOPAEDIC  1979    dupytrens contractures    HX THYROIDECTOMY  05/2013    HX TONSILLECTOMY  1950    SKIN TISSUE PROCEDURE UNLISTED       Current Outpatient Medications   Medication Sig Dispense Refill    MYRBETRIQ 25 mg ER tablet       tamsulosin (FLOMAX) 0.4 mg capsule TAKE 1 CAPSULE BY MOUTH EVERY DAY  3    chlorhexidine (PERIDEX) 0.12 % solution       indomethacin SR (INDOCIN SR) 75 mg SR capsule TAKE 1 CAP BY MOUTH DAILY AS NEEDED (GOUT FLARE). 30 Cap 1    verapamil ER (CALAN-SR) 240 mg CR tablet TAKE 1 TABLET BY MOUTH TWICE A  Tab 1    valsartan (DIOVAN) 160 mg tablet Take 1 Tab by mouth daily. 90 Tab 1    allopurinol (ZYLOPRIM) 300 mg tablet Take 1 Tab by mouth daily. (Patient taking differently: Take 300 mg by mouth daily as needed.) 90 Tab 1    atorvastatin (LIPITOR) 10 mg tablet Take 1 Tab by mouth daily. 90 Tab 1    ketoconazole (NIZORAL) 2 % topical cream Apply  to affected area two (2) times a day.  60 g 3    cholecalciferol, vitamin D3, 2,000 unit tab Take  by mouth.  calcium 500 mg tab Take  by mouth. With potassium      magnesium 250 mg tab Take 400 mg by mouth daily.  Omega-3 Fatty Acids 300 mg cap Take  by mouth. No Known Allergies  Family History   Problem Relation Age of Onset    Diabetes Mother     Heart Disease Mother         heart valve replaced    Hypertension Father     Colon Polyps Father     Cancer Brother         Stomach      Social History     Tobacco Use    Smoking status: Never Smoker    Smokeless tobacco: Never Used   Substance Use Topics    Alcohol use: Yes     Alcohol/week: 1.0 standard drinks     Types: 1 Cans of beer per week     Patient Active Problem List   Diagnosis Code    Hypertension I10    Hyperlipidemia E78.5    Gout M10.9    Multinodular goiter E04.2    Melanoma ([address] C43.9    Hx of adenomatous colonic polyps Z86.010    Obesity, morbid (HCC) E66.01       Depression Risk Factor Screening:     3 most recent PHQ Screens 5/20/2019   Little interest or pleasure in doing things Not at all   Feeling down, depressed, irritable, or hopeless Not at all   Total Score PHQ 2 0     Alcohol Risk Factor Screening: You do not drink alcohol or very rarely. Functional Ability and Level of Safety:   Hearing Loss  Hearing is good. Activities of Daily Living  The home contains: no safety equipment. Patient does total self care    Fall Risk  Fall Risk Assessment, last 12 mths 8/27/2018   Able to walk? Yes   Fall in past 12 months? No       Abuse Screen  Patient is not abused    Cognitive Screening   Evaluation of Cognitive Function:  Has your family/caregiver stated any concerns about your memory: no      Patient Care Team   Patient Care Team:  Yue Jones MD as PCP - General (Internal Medicine)  Rhoda Peña MD (Internal Medicine)    Assessment/Plan   Education and counseling provided:  Are appropriate based on today's review and evaluation    Diagnoses and all orders for this visit:    1.  Medicare annual wellness visit, subsequent    2. Encounter for immunization  -     INFLUENZA VACCINE INACTIVATED (IIV), SUBUNIT, ADJUVANTED, IM  -     ADMIN INFLUENZA VIRUS VAC    3.  Multiple renal cysts        Health Maintenance Due   Topic Date Due    GLAUCOMA SCREENING Q2Y  10/23/2017    MEDICARE YEARLY EXAM  03/14/2019    Influenza Age 9 to Adult  08/01/2019

## 2019-10-27 DIAGNOSIS — M10.00 IDIOPATHIC GOUT, UNSPECIFIED CHRONICITY, UNSPECIFIED SITE: Chronic | ICD-10-CM

## 2019-10-30 RX ORDER — INDOMETHACIN 75 MG/1
75 CAPSULE, EXTENDED RELEASE ORAL
Qty: 30 CAP | Refills: 1 | Status: SHIPPED | OUTPATIENT
Start: 2019-10-30 | End: 2019-11-23 | Stop reason: SDUPTHER

## 2019-11-05 ENCOUNTER — OFFICE VISIT (OUTPATIENT)
Dept: DERMATOLOGY | Facility: AMBULATORY SURGERY CENTER | Age: 69
End: 2019-11-05

## 2019-11-05 ENCOUNTER — HOSPITAL ENCOUNTER (OUTPATIENT)
Dept: LAB | Age: 69
Discharge: HOME | End: 2019-11-05

## 2019-11-05 VITALS
WEIGHT: 300 LBS | BODY MASS INDEX: 39.76 KG/M2 | TEMPERATURE: 98.1 F | HEART RATE: 61 BPM | OXYGEN SATURATION: 96 % | HEIGHT: 73 IN | RESPIRATION RATE: 16 BRPM | DIASTOLIC BLOOD PRESSURE: 70 MMHG | SYSTOLIC BLOOD PRESSURE: 112 MMHG

## 2019-11-05 DIAGNOSIS — D48.5 NEOPLASM OF UNCERTAIN BEHAVIOR OF SKIN OF FOREARM: Primary | ICD-10-CM

## 2019-11-05 DIAGNOSIS — Z85.828 HISTORY OF NONMELANOMA SKIN CANCER: ICD-10-CM

## 2019-11-05 DIAGNOSIS — D18.01 CHERRY ANGIOMA: ICD-10-CM

## 2019-11-05 DIAGNOSIS — L82.1 SEBORRHEIC KERATOSES: ICD-10-CM

## 2019-11-05 DIAGNOSIS — D22.9 MULTIPLE BENIGN NEVI: ICD-10-CM

## 2019-11-05 DIAGNOSIS — L81.4 LENTIGINES: ICD-10-CM

## 2019-11-05 DIAGNOSIS — D48.5 NEOPLASM OF UNCERTAIN BEHAVIOR OF SKIN OF UPPER ARM: ICD-10-CM

## 2019-11-05 RX ORDER — KETOCONAZOLE 20 MG/G
CREAM TOPICAL 2 TIMES DAILY
Qty: 60 G | Refills: 1 | Status: SHIPPED | OUTPATIENT
Start: 2019-11-05 | End: 2020-03-30 | Stop reason: SDUPTHER

## 2019-11-05 NOTE — PROGRESS NOTES
Written by Kathrine Mckeon, as dictated by Martha Hernandez, [id number]. Name: Ruby Valdes       Age: 81 y.o. Date: 11/5/2019    Chief Complaint:   Chief Complaint   Patient presents with    Skin Exam     4 month skin exam       Subjective:    HPI  Mr. Ruby Valdes is a 81 y.o. male who presents for a full skin exam.  The patient's last skin exam was on 7/16/19 and the patient does not have current complaints related to his skin. He reports continued improvement in the axillary rash after using Ketoconzaole cream and requests that he only receives one tube every 3 months now. He is feeling well and in his usual state of health today. He has no current illnesses, no other skin concerns. His allergies, medications, medical, and social history are reviewed by me today. The patient also reports 60 lbs of intentional weight loss. The patient's pertinent skin history includes : MM, NMSC  -BCC, left upper back, cleared w/ shave bx, 7/16/19  -BCC, left cheek, excision, 07/24/18  -BCC, left posterior shoulder, 06/20/17  -MM, left posterior shoulder, stage 1a, depth 0.3 mm, wide excision, 2015  -BCC, scalp and right popliteal area per t prior to first visit    ROS: Constitutional: Negative. Dermatological : negative    Social History     Socioeconomic History    Marital status:      Spouse name: Not on file    Number of children: Not on file    Years of education: Not on file    Highest education level: Not on file   Occupational History    Not on file   Social Needs    Financial resource strain: Not on file    Food insecurity:     Worry: Not on file     Inability: Not on file    Transportation needs:     Medical: Not on file     Non-medical: Not on file   Tobacco Use    Smoking status: Never Smoker    Smokeless tobacco: Never Used   Substance and Sexual Activity    Alcohol use:  Yes     Alcohol/week: 1.0 standard drinks     Types: 1 Cans of beer per week    Drug use: No    Sexual activity: Not Currently     Partners: Female   Lifestyle    Physical activity:     Days per week: Not on file     Minutes per session: Not on file    Stress: Not on file   Relationships    Social connections:     Talks on phone: Not on file     Gets together: Not on file     Attends [employer] service: Not on file     Active member of club or organization: Not on file     Attends meetings of clubs or organizations: Not on file     Relationship status: Not on file    Intimate partner violence:     Fear of current or ex partner: Not on file     Emotionally abused: Not on file     Physically abused: Not on file     Forced sexual activity: Not on file   Other Topics Concern    Not on file   Social History Narrative    Not on file       Family History   Problem Relation Age of Onset    Diabetes Mother     Heart Disease Mother         heart valve replaced    Hypertension Father     Colon Polyps Father     Cancer Brother         Stomach        Past Medical History:   Diagnosis Date    Cancer [address] [address]     melanoma     Gout     Gout     Hypercholesterolemia     Hypertension     Skin cancer     melanoma (left post shoulder) and BCC x 2 (scalp, right popliteal)    Sun-damaged skin     Thyroid disease        Past Surgical History:   Procedure Laterality Date    COLONOSCOPY N/A 5/29/2018    COLONOSCOPY performed by Abdirahman L[facility]pez MD at [facility] END[facility]    HX HEENT Left     partial thyroidectomy    HX ORTHOPAEDIC  1969    Left knee ACL     HX ORTHOPAEDIC  1979    dupytrens contractures    HX THYROIDECTOMY  05/2013    HX TONSILLECTOMY  1950    SKIN TISSUE PROCEDURE UNLISTED         Current Outpatient Medications   Medication Sig Dispense Refill    indomethacin SR (INDOCIN SR) 75 mg SR capsule TAKE 1 CAP BY MOUTH DAILY AS NEEDED (GOUT FLARE).  30 Cap 1    MYRBETRIQ 25 mg ER tablet       tamsulosin (FLOMAX) 0.4 mg capsule TAKE 1 CAPSULE BY MOUTH EVERY DAY  3    chlorhexidine (PERIDEX) 0.12 % solution       verapamil ER (CALAN-SR) 240 mg CR tablet TAKE 1 TABLET BY MOUTH TWICE A  Tab 1    valsartan (DIOVAN) 160 mg tablet Take 1 Tab by mouth daily. 90 Tab 1    allopurinol (ZYLOPRIM) 300 mg tablet Take 1 Tab by mouth daily. (Patient taking differently: Take 300 mg by mouth daily as needed.) 90 Tab 1    atorvastatin (LIPITOR) 10 mg tablet Take 1 Tab by mouth daily. 90 Tab 1    ketoconazole (NIZORAL) 2 % topical cream Apply  to affected area two (2) times a day. 60 g 3    cholecalciferol, vitamin D3, 2,000 unit tab Take  by mouth.  calcium 500 mg tab Take  by mouth. With potassium      magnesium 250 mg tab Take 400 mg by mouth daily.  Omega-3 Fatty Acids 300 mg cap Take  by mouth. No Known Allergies      Objective:    Visit Vitals  /70 (BP 1 Location: Left arm, BP Patient Position: Sitting)   Pulse 61   Temp 98.1 °F (36.7 °C) (Oral)   Resp 16   Ht 6' 1\" (1.854 m)   Wt 300 lb (136.1 kg)   SpO2 96%   BMI 39.58 kg/m²       Shin Santillan is a 81 y.o. male who appears well and in no distress. He is awake, alert, and oriented. There is no preauricular, submandibular, or cervical lymphadenopathy. A skin examination was performed including his scalp, face (including eyelids), ears, neck, chest, back, abdomen, upper extremities (including digits/nails), lower extremities, breasts, buttocks; genital skin was not examined. He has multiple well healed scars without evidence of lesion recurrence. He has a well healed melanoma scar on the left posterior shoulder without pigment, nodularity or other evidence of lesion recurrence. He has scattered waxy macules and keratotic papules consistent with seborrheic keratoses. He  has scattered red papules consistent with cherry angiomas. He has pink intradermal nevi and brown junctional nevi, no concerning features for severe atypia. There are lentigines on sun exposed areas. He has a 6 x 5 mm pink shiny macule on the right upper arm, R/O BCC.  He has a 5 x 4 mm pink scaly macule on the right forearm, R/O BCC. There is a 9 x 4 mm pink papule with a dot of brown pigment on the left lower back most consistent with a nevus - photographed for further monitoring. Numerous skin tags in each axilla. Photos from today's visit:         Right upper arm    Right forearm  Left lower back      Assessment/Plan:  1. Personal history of melanoma and nonmelanoma skin cancer. I discussed sun protection, sunscreen use, the warning signs of skin cancer, the need for self-skin examinations, and the need for regular practitioner exams. The patient should follow up sooner as needed if new, changing, or symptomatic skin lesions arise. 2. Seborrheic keratoses. The diagnosis was reviewed and the patient was reassured that no treatment is needed for these benign lesions. 3. Cherry angiomas. The diagnosis was reviewed and the patient was reassured that no treatment is needed for these benign lesions. 4. Normal nevi. The diagnosis of normal nevi was reviewed. I discussed sun protection, sunscreen use, the warning signs of skin cancer, mole monitoring, the need for self-skin examinations, and the need for regular practitioner exams. The patient should follow up sooner as needed if new, changing, or symptomatic skin lesions arise. 5. Solar lentigos. The diagnosis and relationship to sun exposure was reviewed. Sun protection advised. 6. Neoplasm of Uncertain Behavior, right forearm, R/O BCC. The differential diagnoses were discussed. A shave biopsy was advised to sample this lesion. The procedure was reviewed and verbal and written consent were obtained. The risks of pain, bleeding, infection, and scar were discussed. The patient is aware that this is a sample and is intended for diagnosis and not therapy of the skin lesion. I performed the procedure. The site was cleansed and anesthetized with 1% Lidocaine with Epinephrine 1:100,000.   A shave biopsy was performed to sample the lesion. Drysol was used for hemostasis. The wound was bandaged and care reviewed. The specimen was sent to pathology. I will contact the patient with the results and any further treatment that may be necessary. 7. Neoplasm of Uncertain Behavior, right upper arm, R/O BCC. The differential diagnoses were discussed. A shave biopsy was advised to sample this lesion. The procedure was reviewed and verbal and written consent were obtained. The risks of pain, bleeding, infection, and scar were discussed. The patient is aware that this is a sample and is intended for diagnosis and not therapy of the skin lesion. I performed the procedure. The site was cleansed and anesthetized with 1% Lidocaine with Epinephrine 1:100,000. A shave biopsy was performed to sample the lesion. Drysol was used for hemostasis. The wound was bandaged and care reviewed. The specimen was sent to pathology. I will contact the patient with the results and any further treatment that may be necessary. Next skin exam:  4 months      This plan was reviewed with the patient and patient agrees. All questions were answered. This scribe documentation was reviewed by me and accurately reflects the examination and decisions made by me. [facility]   OFFICE PROCEDURE PROGRESS NOTE   Chart reviewed for the following:   Orestes CLEMENTE, have reviewed the History, Physical and updated the Allergic reactions for [address] [place] [address] performed immediately prior to start of procedure:   Orestes CLEMENTE, have performed the following reviews on [place] [id number]   prior to the start of the procedure:     * Patient was identified by name and date of birth   * Agreement on procedure being performed was verified   * Risks and Benefits explained to the patient   * Procedure site verified and marked as necessary   * Patient was positioned for comfort   * Consent was signed and verified     Time: 11:05 AM  Date of procedure: 11/5/2019  Procedure performed by: Ori Jarvis.  Rasta Abreu  Provider assisted by: Ryan Lewis MA  Patient assisted by: self   How tolerated by patient: tolerated the procedure well with no complications   Comments: none

## 2019-11-05 NOTE — PROGRESS NOTES
Estrella Hood is a 81 y.o. male     Chief Complaint   Patient presents with    Skin Exam     4 month skin exam       Visit Vitals  /70 (BP 1 Location: Left arm, BP Patient Position: Sitting)   Pulse 61   Temp 98.1 °F (36.7 °C) (Oral)   Resp 16   Ht 6' 1\" (1.854 m)   Wt 300 lb (136.1 kg)   SpO2 96%   BMI 39.58 kg/m²       Health Maintenance Due   Topic Date Due    GLAUCOMA SCREENING Q2Y  10/23/2017       1. Have you been to the ER, urgent care clinic since your last visit? Hospitalized since your last visit? No    2. Have you seen or consulted any other health care providers outside of the [address] [place] [address] since your last visit? Include any pap smears or colon screening.  No

## 2019-11-07 NOTE — PROGRESS NOTES
I spoke w/ the patient and he is aware of the lesion on the upper arm is BCC and needs further tx. Discussed 5-Fu vs, curettage vs excision with risks/ benefits/ cure discussed. He elects curettage and will schedule in about 2 weeks. There was an inflamed SK on the forearm diagnosed which needs no further tx.

## 2019-11-23 DIAGNOSIS — M10.00 IDIOPATHIC GOUT, UNSPECIFIED CHRONICITY, UNSPECIFIED SITE: Chronic | ICD-10-CM

## 2019-11-25 ENCOUNTER — OFFICE VISIT (OUTPATIENT)
Dept: DERMATOLOGY | Facility: AMBULATORY SURGERY CENTER | Age: 69
End: 2019-11-25

## 2019-11-25 VITALS
RESPIRATION RATE: 16 BRPM | DIASTOLIC BLOOD PRESSURE: 62 MMHG | WEIGHT: 304 LBS | OXYGEN SATURATION: 97 % | SYSTOLIC BLOOD PRESSURE: 114 MMHG | TEMPERATURE: 98.1 F | HEART RATE: 70 BPM | BODY MASS INDEX: 40.29 KG/M2 | HEIGHT: 73 IN

## 2019-11-25 DIAGNOSIS — C44.612 BASAL CELL CARCINOMA OF RIGHT UPPER ARM: Primary | ICD-10-CM

## 2019-11-25 RX ORDER — MELOXICAM 15 MG/1
15 TABLET ORAL DAILY
COMMUNITY
Start: 2019-11-20 | End: 2021-06-22 | Stop reason: ALTCHOICE

## 2019-11-25 NOTE — PROGRESS NOTES
Written by Deena Alvarado, as dictated by Shanna Greene, [address]. Name: Alda Plaza       Age: 81 y.o. Date: 11/25/2019    Chief Complaint: Treatment of BCC    Subjective:    HPI:  Mr.. Alda Plaza is a 81 y.o. male who presents for treatment of biopsy proven basal cell carcinoma on the right upper arm. He reports that the area has healed well from the biopsy and without complication. He is feeling well and in his usual state of health today. He has no current illnesses, no other skin concerns. His allergies, medications, medical, and social history are reviewed by me today. ROS: Consitutional: Negative  Dermatological : negative    Social History     Socioeconomic History    Marital status:      Spouse name: Not on file    Number of children: Not on file    Years of education: Not on file    Highest education level: Not on file   Occupational History    Not on file   Social Needs    Financial resource strain: Not on file    Food insecurity:     Worry: Not on file     Inability: Not on file    Transportation needs:     Medical: Not on file     Non-medical: Not on file   Tobacco Use    Smoking status: Never Smoker    Smokeless tobacco: Never Used   Substance and Sexual Activity    Alcohol use:  Yes     Alcohol/week: 1.0 standard drinks     Types: 1 Cans of beer per week    Drug use: No    Sexual activity: Not Currently     Partners: Female   Lifestyle    Physical activity:     Days per week: Not on file     Minutes per session: Not on file    Stress: Not on file   Relationships    Social connections:     Talks on phone: Not on file     Gets together: Not on file     Attends Anabaptist service: Not on file     Active member of club or organization: Not on file     Attends meetings of clubs or organizations: Not on file     Relationship status: Not on file    Intimate partner violence:     Fear of current or ex partner: Not on file     Emotionally abused: Not on file Physically abused: Not on file     Forced sexual activity: Not on file   Other Topics Concern    Not on file   Social History Narrative    Not on file       Family History   Problem Relation Age of Onset    Diabetes Mother     Heart Disease Mother         heart valve replaced    Hypertension Father     Colon Polyps Father     Cancer Brother         Stomach        Past Medical History:   Diagnosis Date    Cancer ([address]     melanoma     Gout     Gout     Hypercholesterolemia     Hypertension     Skin cancer     melanoma (left post shoulder) and BCC x 2 (scalp, right popliteal)    Sun-damaged skin     Thyroid disease        Past Surgical History:   Procedure Laterality Date    COLONOSCOPY N/A 5/29/2018    COLONOSCOPY performed by Reshma Pacheco MD at [facility] END[facility]COPY    HX HEENT Left     partial thyroidectomy    HX ORTHOPAEDIC  1969    Left knee ACL     HX ORTHOPAEDIC  1979    dupytrens contractures    HX THYROIDECTOMY  05/2013    HX TONSILLECTOMY  1950    SKIN TISSUE PROCEDURE UNLISTED         Current Outpatient Medications   Medication Sig Dispense Refill    meloxicam (MOBIC) 15 mg tablet Take 15 mg by mouth daily.  ketoconazole (NIZORAL) 2 % topical cream Apply  to affected area two (2) times a day. (Patient taking differently: Apply  to affected area as needed.) 60 g 1    indomethacin SR (INDOCIN SR) 75 mg SR capsule TAKE 1 CAP BY MOUTH DAILY AS NEEDED (GOUT FLARE). 30 Cap 1    MYRBETRIQ 25 mg ER tablet       tamsulosin (FLOMAX) 0.4 mg capsule TAKE 1 CAPSULE BY MOUTH EVERY DAY  3    chlorhexidine (PERIDEX) 0.12 % solution as needed.  verapamil ER (CALAN-SR) 240 mg CR tablet TAKE 1 TABLET BY MOUTH TWICE A  Tab 1    valsartan (DIOVAN) 160 mg tablet Take 1 Tab by mouth daily. 90 Tab 1    allopurinol (ZYLOPRIM) 300 mg tablet Take 1 Tab by mouth daily.  (Patient taking differently: Take 300 mg by mouth daily as needed.) 90 Tab 1    atorvastatin (LIPITOR) 10 mg tablet Take 1 Tab by mouth daily. 90 Tab 1    cholecalciferol, vitamin D3, 2,000 unit tab Take  by mouth.  calcium 500 mg tab Take  by mouth. With potassium      magnesium 250 mg tab Take 400 mg by mouth daily.  Omega-3 Fatty Acids 300 mg cap Take  by mouth. No Known Allergies      Objective:    Visit Vitals  /62 (BP 1 Location: Left arm, BP Patient Position: Sitting)   Pulse 70   Temp 98.1 °F (36.7 °C) (Oral)   Resp 16   Ht 6' 1\" (1.854 m)   Wt 304 lb (137.9 kg)   SpO2 97%   BMI 40.11 kg/m²       Shraddha Hassan is a 81 y.o. male who appears well and in no distress. He is awake, alert, and oriented. A limited skin examination was completed including the right upper arm. There is a 8 x 5 mm well healed biopsy scar on the right upper arm. Assessment/Plan:  Basal cell carcinoma, right upper arm. I reviewed the diagnosis with him. Curettage was advised to address this lesion with malignant destruction. The procedure was reviewed and verbal and written consent were obtained. The risks of pain, bleeding, infection, scar, and recurrence of the lesion were discussed. I performed the procedure. The site was cleansed and anesthetized with 1% Lidocaine with Epinephrine 1:100,000. Curettage was performed by me to include a 2 mm margin, resulting in a post curettage defect size of 12 x 9 mm. Drysol was used for hemostasis. The wound was bandaged and care reviewed. This plan was reviewed with the patient and patient agrees. All questions were answered. This scribe documentation was reviewed by me and accurately reflects the examination and decisions made by me. [facility]   OFFICE PROCEDURE PROGRESS NOTE   Chart reviewed for the following:   Orestes CLEMENTE, have reviewed the History, Physical and updated the Allergic reactions for [address] [place] [address] performed immediately prior to start of procedure:   Orestes CLEMENTE DNP, have performed the following reviews on Ludwin Shaffer   prior to the start of the procedure:     * Patient was identified by name and date of birth   * Agreement on procedure being performed was verified   * Risks and Benefits explained to the patient   * Procedure site verified and marked as necessary   * Patient was positioned for comfort   * Consent was signed and verified     Time: 1:15 pm  Date of procedure: 11/25/2019  Procedure performed by: Mary Simmons.  David Gutiérrez  Provider assisted by: Ronny Romero LPN   Patient assisted by: self   How tolerated by patient: tolerated the procedure well with no complications   Comments: none

## 2019-11-26 RX ORDER — INDOMETHACIN 75 MG/1
75 CAPSULE, EXTENDED RELEASE ORAL
Qty: 30 CAP | Refills: 1 | Status: SHIPPED | OUTPATIENT
Start: 2019-11-26 | End: 2019-12-31

## 2019-12-10 DIAGNOSIS — M10.00 IDIOPATHIC GOUT, UNSPECIFIED CHRONICITY, UNSPECIFIED SITE: Chronic | ICD-10-CM

## 2019-12-11 DIAGNOSIS — I10 ESSENTIAL HYPERTENSION: Chronic | ICD-10-CM

## 2019-12-13 RX ORDER — VALSARTAN 160 MG/1
TABLET ORAL
Qty: 90 TAB | Refills: 1 | Status: SHIPPED | OUTPATIENT
Start: 2019-12-13 | End: 2020-05-01

## 2019-12-13 RX ORDER — ALLOPURINOL 300 MG/1
TABLET ORAL
Qty: 90 TAB | Refills: 1 | Status: SHIPPED | OUTPATIENT
Start: 2019-12-13 | End: 2020-08-28

## 2019-12-25 DIAGNOSIS — M10.00 IDIOPATHIC GOUT, UNSPECIFIED CHRONICITY, UNSPECIFIED SITE: Chronic | ICD-10-CM

## 2019-12-31 RX ORDER — INDOMETHACIN 75 MG/1
75 CAPSULE, EXTENDED RELEASE ORAL
Qty: 30 CAP | Refills: 1 | Status: SHIPPED | OUTPATIENT
Start: 2019-12-31 | End: 2021-10-27 | Stop reason: SDUPTHER

## 2020-01-13 DIAGNOSIS — I10 ESSENTIAL HYPERTENSION: Chronic | ICD-10-CM

## 2020-01-13 RX ORDER — VERAPAMIL HYDROCHLORIDE 240 MG/1
TABLET, FILM COATED, EXTENDED RELEASE ORAL
Qty: 180 TAB | Refills: 1 | Status: SHIPPED | OUTPATIENT
Start: 2020-01-13 | End: 2020-08-28

## 2020-01-15 ENCOUNTER — OFFICE VISIT (OUTPATIENT)
Dept: INTERNAL MEDICINE CLINIC | Age: 70
End: 2020-01-15

## 2020-01-15 VITALS
DIASTOLIC BLOOD PRESSURE: 70 MMHG | TEMPERATURE: 97.9 F | BODY MASS INDEX: 41.75 KG/M2 | HEIGHT: 73 IN | WEIGHT: 315 LBS | RESPIRATION RATE: 19 BRPM | OXYGEN SATURATION: 96 % | SYSTOLIC BLOOD PRESSURE: 120 MMHG | HEART RATE: 62 BPM

## 2020-01-15 DIAGNOSIS — E78.00 PURE HYPERCHOLESTEROLEMIA: Primary | ICD-10-CM

## 2020-01-15 DIAGNOSIS — R60.0 LOWER EXTREMITY EDEMA: ICD-10-CM

## 2020-01-15 RX ORDER — FUROSEMIDE 20 MG/1
10 TABLET ORAL
Qty: 30 TAB | Refills: 0 | Status: SHIPPED | OUTPATIENT
Start: 2020-01-15 | End: 2020-04-15

## 2020-01-15 NOTE — PROGRESS NOTES
Follow Up Visit    Richie Forman is a 81 y.o. male. he presents for Referral Follow Up and Medication Evaluation    He has ongoing left hand/thumb pain. Has seen ortho. Plan for follow up. Improved on NSAID (meloxicam). He is considering surgery but will talk further with the hand specialist.     Urine is \"under control\". He has followed up with urology. Doing well. No acute needs at present. He has some episodic lower extremity swelling. Asking if he can use prn lasix. He has this at times during the winter as he is more sedentary. Patient Active Problem List   Diagnosis Code    Hypertension I10    Hyperlipidemia E78.5    Gout M10.9    Multinodular goiter E04.2    Melanoma ([facility] [address] C43.9    Hx of adenomatous colonic polyps Z86.010    Obesity, morbid ([facility] [address] E66.01         Prior to Admission medications    Medication Sig Start Date End Date Taking? Authorizing Provider   verapamil ER (CALAN-SR) 240 mg CR tablet TAKE 1 TABLET BY MOUTH TWICE A DAY 1/13/20  Yes Raza Moscoso MD   allopurinol (ZYLOPRIM) 300 mg tablet TAKE 1 TABLET BY MOUTH EVERY DAY 12/13/19  Yes Raza Moscoso MD   valsartan (DIOVAN) 160 mg tablet TAKE 1 TABLET DAILY 12/13/19  Yes Raza Moscoso MD   meloxicam (MOBIC) 15 mg tablet Take 15 mg by mouth daily. 11/20/19  Yes Provider, Historical   ketoconazole (NIZORAL) 2 % topical cream Apply  to affected area two (2) times a day. Patient taking differently: Apply  to affected area as needed. 11/5/19  Yes Francia Ware NP   MYRBETRIQ 25 mg ER tablet  9/26/19  Yes Provider, Historical   tamsulosin (FLOMAX) 0.4 mg capsule TAKE 1 CAPSULE BY MOUTH EVERY DAY 9/5/19  Yes Provider, Historical   chlorhexidine (PERIDEX) 0.12 % solution as needed. 10/1/19  Yes Provider, Historical   atorvastatin (LIPITOR) 10 mg tablet Take 1 Tab by mouth daily. 5/20/19  Yes Raza Moscoso MD   cholecalciferol, vitamin D3, 2,000 unit tab Take  by mouth.    Yes Provider, Historical   calcium 500 mg tab Take  by mouth. With potassium   Yes Provider, Historical   magnesium 250 mg tab Take 400 mg by mouth daily. Yes Provider, Historical   indomethacin SR (INDOCIN SR) 75 mg SR capsule TAKE 1 CAP BY MOUTH DAILY AS NEEDED (GOUT FLARE). 12/31/19   Anais Higginbotham MD   Omega-3 Fatty Acids 300 mg cap Take  by mouth. Provider, Historical         Health Maintenance   Topic Date Due    GLAUCOMA SCREENING Q2Y  10/23/2017    MEDICARE YEARLY EXAM  10/07/2020    COLONOSCOPY  05/29/2023    DTaP/Tdap/Td series (2 - Td) 11/24/2024    Hepatitis C Screening  Completed    Shingrix Vaccine Age 50>  Completed    Influenza Age 5 to Adult  Completed    Pneumococcal 65+ years  Completed       Review of Systems   Constitutional: Negative. Respiratory: Negative. Cardiovascular: Negative. Neurological: Negative. Visit Vitals  /70 (BP 1 Location: Left arm, BP Patient Position: Sitting)   Pulse 62   Temp 97.9 °F (36.6 °C) (Oral)   Resp 19   Ht 6' 1\" (1.854 m)   Wt 330 lb 6.4 oz (149.9 kg)   SpO2 96%   BMI 43.59 kg/m²       Physical Exam  Constitutional:       Appearance: Normal appearance. Cardiovascular:      Rate and Rhythm: Normal rate and regular rhythm. Pulmonary:      Effort: Pulmonary effort is normal.      Breath sounds: Normal breath sounds. Neurological:      Mental Status: He is alert. ASSESSMENT/PLAN    Diagnoses and all orders for this visit:    1. Pure hypercholesterolemia  -     METABOLIC PANEL, COMPREHENSIVE  -     LIPID PANEL    2. Lower extremity edema  -     furosemide (LASIX) 20 mg tablet; Take 0.5 Tabs by mouth daily as needed (swelling). Follow-up and Dispositions    · Return in about 9 months (around 10/15/2020) for Medicare Wellness Visit- 30 minute appointment.

## 2020-03-08 DIAGNOSIS — E78.00 PURE HYPERCHOLESTEROLEMIA: Chronic | ICD-10-CM

## 2020-03-09 RX ORDER — ATORVASTATIN CALCIUM 10 MG/1
TABLET, FILM COATED ORAL
Qty: 90 TAB | Refills: 1 | Status: SHIPPED | OUTPATIENT
Start: 2020-03-09 | End: 2020-08-28

## 2020-03-17 ENCOUNTER — TELEPHONE (OUTPATIENT)
Dept: DERMATOLOGY | Facility: AMBULATORY SURGERY CENTER | Age: 70
End: 2020-03-17

## 2020-03-30 ENCOUNTER — OFFICE VISIT (OUTPATIENT)
Dept: DERMATOLOGY | Facility: AMBULATORY SURGERY CENTER | Age: 70
End: 2020-03-30

## 2020-03-30 VITALS
RESPIRATION RATE: 16 BRPM | HEIGHT: 73 IN | OXYGEN SATURATION: 96 % | BODY MASS INDEX: 41.75 KG/M2 | TEMPERATURE: 98.4 F | HEART RATE: 62 BPM | WEIGHT: 315 LBS

## 2020-03-30 DIAGNOSIS — L23.5 ALLERGIC DERMATITIS DUE TO OTHER CHEMICAL PRODUCT: Primary | ICD-10-CM

## 2020-03-30 DIAGNOSIS — D22.9 MULTIPLE BENIGN NEVI: ICD-10-CM

## 2020-03-30 DIAGNOSIS — R21 RASH AND OTHER NONSPECIFIC SKIN ERUPTION: ICD-10-CM

## 2020-03-30 DIAGNOSIS — Z85.828 HISTORY OF NONMELANOMA SKIN CANCER: ICD-10-CM

## 2020-03-30 DIAGNOSIS — Z85.820 PERSONAL HISTORY OF MALIGNANT MELANOMA OF SKIN: ICD-10-CM

## 2020-03-30 DIAGNOSIS — L08.9 SKIN PUSTULE: ICD-10-CM

## 2020-03-30 DIAGNOSIS — L91.8 CUTANEOUS SKIN TAGS: ICD-10-CM

## 2020-03-30 DIAGNOSIS — D18.01 CHERRY ANGIOMA: ICD-10-CM

## 2020-03-30 DIAGNOSIS — L82.1 SEBORRHEIC KERATOSES: ICD-10-CM

## 2020-03-30 DIAGNOSIS — L30.9 DERMATITIS: ICD-10-CM

## 2020-03-30 DIAGNOSIS — Z12.83 SCREENING FOR MALIGNANT NEOPLASM OF SKIN: ICD-10-CM

## 2020-03-30 RX ORDER — TRIAMCINOLONE ACETONIDE 1 MG/G
CREAM TOPICAL 2 TIMES DAILY
Qty: 60 G | Refills: 2 | Status: SHIPPED | OUTPATIENT
Start: 2020-03-30

## 2020-03-30 RX ORDER — DICLOFENAC SODIUM 10 MG/G
GEL TOPICAL
COMMUNITY
Start: 2020-03-21 | End: 2021-05-10 | Stop reason: ALTCHOICE

## 2020-03-30 RX ORDER — KETOCONAZOLE 20 MG/G
CREAM TOPICAL
Qty: 60 G | Refills: 2 | Status: SHIPPED | OUTPATIENT
Start: 2020-03-30 | End: 2021-05-10 | Stop reason: SDUPTHER

## 2020-03-30 NOTE — PROGRESS NOTES
Name: Susan Thibodeaux       Age: 81 y.o. Date: 3/30/2020    Chief Complaint: skin exam, spot on right upper arm and rash popliteal area and left wrist    Subjective:    HPI  Mr. Susan Thibodeaux is a 81 y.o. male who presents for a full skin exam.  The patient's last skin exam was on 11/15/19 and the patient does have current complaints related to his skin. He reports a rash that is under his wrist band on the left wrist recently. He has cleaned the watch without relief. 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 He also reports intermittent \"poison ivy like\" rash on the popliteal areas. He is unsure if this is related to his plastic tractor seat. Last, the lesion on the right upper arm that was treated at the last visit still seems pink. He reports his wife has mentioned this. He desires refills for his Triamcinolone and Ketoconazole creams. The patient's pertinent skin history includes : MM, NMSC  -BCC right upper arm, curetted, 11/25/19  -BCC, left upper back, cleared w/ shave bx, 7/16/19  -BCC, left cheek, excision, 07/24/18  -BCC, left posterior shoulder, 06/20/17  -MM, left posterior shoulder, stage 1a, depth 0.3 mm, wide excision, 2015  -BCC, scalp and right popliteal area per t prior to first visit    ROS: Constitutional: Negative.     Dermatological : positive for - rash and skin lesion changes      Social History     Socioeconomic History    Marital status:      Spouse name: Not on file    Number of children: Not on file    Years of education: Not on file    Highest education level: Not on file   Occupational History    Not on file   Social Needs    Financial resource strain: Not on file    Food insecurity     Worry: Not on file     Inability: Not on file    Transportation needs     Medical: Not on file     Non-medical: Not on file   Tobacco Use    Smoking status: Never Smoker    Smokeless tobacco: Never Used   Substance and Sexual Activity    Alcohol use: Yes     Alcohol/week: 1.0 standard drinks     Types: 1 Cans of beer per week    Drug use: No    Sexual activity: Not Currently     Partners: Female   Lifestyle    Physical activity     Days per week: Not on file     Minutes per session: Not on file    Stress: Not on file   Relationships    Social connections     Talks on phone: Not on file     Gets together: Not on file     Attends Shinto service: Not on file     Active member of club or organization: Not on file     Attends meetings of clubs or organizations: Not on file     Relationship status: Not on file    Intimate partner violence     Fear of current or ex partner: Not on file     Emotionally abused: Not on file     Physically abused: Not on file     Forced sexual activity: Not on file   Other Topics Concern    Not on file   Social History Narrative    Not on file       Family History   Problem Relation Age of Onset    Diabetes Mother     Heart Disease Mother         heart valve replaced    Hypertension Father     Colon Polyps Father     Cancer Brother         Stomach        Past Medical History:   Diagnosis Date    Cancer ([facility] [address]     melanoma     Gout     Gout     Hypercholesterolemia     Hypertension     Skin cancer     melanoma (left post shoulder) and BCC x 2 (scalp, right popliteal)    Sun-damaged skin     Thyroid disease        Past Surgical History:   Procedure Laterality Date    COLONOSCOPY N/A 5/29/2018    COLONOSCOPY performed by Sohail Hagen MD at [facility] ENDOSCOPY    HX HEENT Left     partial thyroidectomy    HX ORTHOPAEDIC  1969    Left knee ACL     HX ORTHOPAEDIC  1979    dupytrens contractures    HX THYROIDECTOMY  05/2013    HX TONSILLECTOMY  1950    SKIN TISSUE PROCEDURE UNLISTED         Current Outpatient Medications   Medication Sig Dispense Refill    diclofenac (VOLTAREN) 1 % gel APPLY 1 GRAM TO AFFECTED AREA 3 4 TIMES PER DAY AS NEEDED.       ketoconazole (NIZORAL) 2 % topical cream Apply to the area twice daily for rash 60 g 2    triamcinolone acetonide (KENALOG) 0.1 % topical cream Apply  to affected area two (2) times a day. use thin layer 60 g 2    atorvastatin (LIPITOR) 10 mg tablet TAKE 1 TABLET BY MOUTH EVERY DAY 90 Tab 1    furosemide (LASIX) 20 mg tablet Take 0.5 Tabs by mouth daily as needed (swelling). 30 Tab 0    verapamil ER (CALAN-SR) 240 mg CR tablet TAKE 1 TABLET BY MOUTH TWICE A  Tab 1    indomethacin SR (INDOCIN SR) 75 mg SR capsule TAKE 1 CAP BY MOUTH DAILY AS NEEDED (GOUT FLARE). 30 Cap 1    allopurinol (ZYLOPRIM) 300 mg tablet TAKE 1 TABLET BY MOUTH EVERY DAY 90 Tab 1    valsartan (DIOVAN) 160 mg tablet TAKE 1 TABLET DAILY 90 Tab 1    meloxicam (MOBIC) 15 mg tablet Take 15 mg by mouth daily.  MYRBETRIQ 25 mg ER tablet Take 25 mg by mouth daily.  tamsulosin (FLOMAX) 0.4 mg capsule Take 0.4 mg by mouth daily. 3    chlorhexidine (PERIDEX) 0.12 % solution as needed.  cholecalciferol, vitamin D3, 2,000 unit tab Take  by mouth two (2) times a day.  magnesium 250 mg tab Take 400 mg by mouth daily.  Omega-3 Fatty Acids 300 mg cap Take  by mouth.  calcium 500 mg tab Take  by mouth. With potassium/ Currently not taking         No Known Allergies      Objective:    Visit Vitals  Pulse 62   Temp 98.4 °F (36.9 °C) (Oral)   Resp 16   Ht 6' 1\" (1.854 m)   Wt 149.2 kg (329 lb)   SpO2 96%   BMI 43.41 kg/m²       Sarbjit Carbajal is a 81 y.o. male who appears well and in no distress. He is awake, alert, and oriented. There is no preauricular, submandibular, or cervical lymphadenopathy. A skin examination was performed including his scalp, face (including eyelids), ears, neck, chest, back, abdomen, upper extremities (including digits/nails), lower extremities, breasts, buttocks; genital skin was not examined. He has a linear macular pink confluent rash on the left wrist and confluent patches of pink, non vesicular rash on the popliteal areas.  Wrist is likely contact dermatitis while popliteal areas favor dermatitis. He has a pink macule on the right upper arm, consistent with scar post curettage. No recurrence is identified. He has multiple well healed scars without evidence of lesion recurrence. He has a well healed melanoma scar on the left posterior shoulder without pigment, nodularity or other evidence of lesion recurrence. He has scattered waxy macules and keratotic papules consistent with seborrheic keratoses. He  has scattered red papules consistent with cherry angiomas. He has pink intradermal nevi and brown junctional nevi, no concerning features for severe atypia. There are lentigines on sun exposed areas. H There is a 9 x 4 mm pink papule with a dot of brown pigment on the left lower back most consistent with a nevus - appearing unchanged from prior visit. Numerous skin tags in each axilla. a few pustules are noted on the skin. Axillary rash and seb dermatitis on the face is much improved today. There is a firm pink papule on the left popliteal area. Assessment/Plan: 1. Normal nevi. The diagnosis of normal nevi was reviewed. I discussed sun protection, sunscreen use, the warning signs of skin cancer, mole monitoring, the need for self-skin examinations, and the need for regular practitioner exams. The patient should follow up sooner as needed if new, changing, or symptomatic skin lesions arise. 2.Seborrheic keratoses. The diagnosis was reviewed and the patient was reassured that no treatment is needed for these benign lesions. 3.Cherry angiomas. The diagnosis was reviewed and the patient was reassured that no treatment is needed for these benign lesions. 4.Personal history of skin cancer- NMSC and MM. I discussed sun protection, sunscreen use, the warning signs of skin cancer, the need for self-skin examinations, and the need for regular practitioner exams.   The patient should follow up sooner as needed if new, changing, or symptomatic skin lesions arise. 5. Cutaneous skin tags. 6. Rash. Given refills on Triamcinolone and Ketoconazole. Try watch on the right wrist. If reaction occurs there - consider cloth or leather band instead of plastic. 7.Dermatofibroma. The diagnosis was reviewed and the patient was reassured that no treatment is needed for these benign conditions at this time. The patient should follow up should the lesion change or become symptomatic.       Next skin exam:  6 months

## 2020-05-01 DIAGNOSIS — I10 ESSENTIAL HYPERTENSION: Chronic | ICD-10-CM

## 2020-05-01 RX ORDER — VALSARTAN 160 MG/1
TABLET ORAL
Qty: 90 TAB | Refills: 1 | Status: SHIPPED | OUTPATIENT
Start: 2020-05-01 | End: 2020-11-16

## 2020-08-28 DIAGNOSIS — I10 ESSENTIAL HYPERTENSION: Chronic | ICD-10-CM

## 2020-08-28 DIAGNOSIS — E78.00 PURE HYPERCHOLESTEROLEMIA: Chronic | ICD-10-CM

## 2020-08-28 DIAGNOSIS — M10.00 IDIOPATHIC GOUT, UNSPECIFIED CHRONICITY, UNSPECIFIED SITE: Chronic | ICD-10-CM

## 2020-08-28 RX ORDER — ATORVASTATIN CALCIUM 10 MG/1
TABLET, FILM COATED ORAL
Qty: 90 TAB | Refills: 1 | Status: SHIPPED | OUTPATIENT
Start: 2020-08-28 | End: 2021-02-11 | Stop reason: SDUPTHER

## 2020-08-28 RX ORDER — ALLOPURINOL 300 MG/1
TABLET ORAL
Qty: 90 TAB | Refills: 1 | Status: SHIPPED | OUTPATIENT
Start: 2020-08-28 | End: 2021-02-11 | Stop reason: SDUPTHER

## 2020-08-28 RX ORDER — VERAPAMIL HYDROCHLORIDE 240 MG/1
TABLET, FILM COATED, EXTENDED RELEASE ORAL
Qty: 180 TAB | Refills: 1 | Status: SHIPPED | OUTPATIENT
Start: 2020-08-28 | End: 2021-02-11 | Stop reason: SDUPTHER

## 2020-10-07 ENCOUNTER — HOSPITAL ENCOUNTER (OUTPATIENT)
Dept: LAB | Age: 70
Discharge: HOME | End: 2020-10-07
Payer: MEDICARE

## 2020-10-07 PROCEDURE — 80061 LIPID PANEL: CPT

## 2020-10-07 PROCEDURE — 36415 COLL VENOUS BLD VENIPUNCTURE: CPT

## 2020-10-07 PROCEDURE — 80053 COMPREHEN METABOLIC PANEL: CPT

## 2020-10-08 LAB
ALBUMIN SERPL-MCNC: 4.6 G/DL (ref 3.8–4.8)
ALBUMIN/GLOB SERPL: 2 {RATIO} (ref 1.2–2.2)
ALP SERPL-CCNC: 96 IU/L (ref 39–117)
ALT SERPL-CCNC: 18 IU/L (ref 0–44)
AST SERPL-CCNC: 22 IU/L (ref 0–40)
BILIRUB SERPL-MCNC: 0.9 MG/DL (ref 0–1.2)
BUN SERPL-MCNC: 19 MG/DL (ref 8–27)
BUN/CREAT SERPL: 16 (ref 10–24)
CALCIUM SERPL-MCNC: 9.1 MG/DL (ref 8.6–10.2)
CHLORIDE SERPL-SCNC: 103 MMOL/L (ref 96–106)
CHOLEST SERPL-MCNC: 125 MG/DL (ref 100–199)
CO2 SERPL-SCNC: 23 MMOL/L (ref 20–29)
CREAT SERPL-MCNC: 1.16 MG/DL (ref 0.76–1.27)
GLOBULIN SER CALC-MCNC: 2.3 G/DL (ref 1.5–4.5)
GLUCOSE SERPL-MCNC: 98 MG/DL (ref 65–99)
HDLC SERPL-MCNC: 51 MG/DL
LDLC SERPL CALC-MCNC: 61 MG/DL (ref 0–99)
POTASSIUM SERPL-SCNC: 4.4 MMOL/L (ref 3.5–5.2)
PROT SERPL-MCNC: 6.9 G/DL (ref 6–8.5)
SODIUM SERPL-SCNC: 139 MMOL/L (ref 134–144)
TRIGL SERPL-MCNC: 57 MG/DL (ref 0–149)
VLDLC SERPL CALC-MCNC: 13 MG/DL (ref 5–40)

## 2020-10-16 ENCOUNTER — OFFICE VISIT (OUTPATIENT)
Dept: INTERNAL MEDICINE CLINIC | Age: 70
End: 2020-10-16
Payer: MEDICARE

## 2020-10-16 VITALS
BODY MASS INDEX: 41.75 KG/M2 | TEMPERATURE: 96.9 F | SYSTOLIC BLOOD PRESSURE: 110 MMHG | HEART RATE: 52 BPM | OXYGEN SATURATION: 96 % | WEIGHT: 315 LBS | HEIGHT: 73 IN | DIASTOLIC BLOOD PRESSURE: 70 MMHG | RESPIRATION RATE: 16 BRPM

## 2020-10-16 DIAGNOSIS — R82.81 PYURIA: ICD-10-CM

## 2020-10-16 DIAGNOSIS — I10 ESSENTIAL HYPERTENSION: Primary | ICD-10-CM

## 2020-10-16 DIAGNOSIS — Z23 NEEDS FLU SHOT: ICD-10-CM

## 2020-10-16 DIAGNOSIS — R35.0 URINARY FREQUENCY: ICD-10-CM

## 2020-10-16 DIAGNOSIS — E78.00 PURE HYPERCHOLESTEROLEMIA: ICD-10-CM

## 2020-10-16 PROCEDURE — G8427 DOCREV CUR MEDS BY ELIG CLIN: HCPCS | Performed by: INTERNAL MEDICINE

## 2020-10-16 PROCEDURE — 90694 VACC AIIV4 NO PRSRV 0.5ML IM: CPT

## 2020-10-16 PROCEDURE — G8536 NO DOC ELDER MAL SCRN: HCPCS | Performed by: INTERNAL MEDICINE

## 2020-10-16 PROCEDURE — 1101F PT FALLS ASSESS-DOCD LE1/YR: CPT | Performed by: INTERNAL MEDICINE

## 2020-10-16 PROCEDURE — G8754 DIAS BP LESS 90: HCPCS | Performed by: INTERNAL MEDICINE

## 2020-10-16 PROCEDURE — 3017F COLORECTAL CA SCREEN DOC REV: CPT | Performed by: INTERNAL MEDICINE

## 2020-10-16 PROCEDURE — G8417 CALC BMI ABV UP PARAM F/U: HCPCS | Performed by: INTERNAL MEDICINE

## 2020-10-16 PROCEDURE — G8752 SYS BP LESS 140: HCPCS | Performed by: INTERNAL MEDICINE

## 2020-10-16 PROCEDURE — G8432 DEP SCR NOT DOC, RNG: HCPCS | Performed by: INTERNAL MEDICINE

## 2020-10-16 PROCEDURE — 99214 OFFICE O/P EST MOD 30 MIN: CPT | Performed by: INTERNAL MEDICINE

## 2020-10-16 PROCEDURE — G0463 HOSPITAL OUTPT CLINIC VISIT: HCPCS | Performed by: INTERNAL MEDICINE

## 2020-10-16 NOTE — PATIENT INSTRUCTIONS
Vaccine Information Statement Influenza (Flu) Vaccine (Inactivated or Recombinant): What You Need to Know Many Vaccine Information Statements are available in English and other languages. See www.immunize.org/vis Hojas de información sobre vacunas están disponibles en español y en muchos otros idiomas. Visite www.immunize.org/vis 1. Why get vaccinated? Influenza vaccine can prevent influenza (flu). Flu is a contagious disease that spreads around the [facility] every year, usually between October and May. Anyone can get the flu, but it is more dangerous for some people. Infants and young children, people 72years of age and older, pregnant women, and people with certain health conditions or a weakened immune system are at greatest risk of flu complications. Pneumonia, bronchitis, sinus infections and ear infections are examples of flu-related complications. If you have a medical condition, such as heart disease, cancer or diabetes, flu can make it worse. Flu can cause fever and chills, sore throat, muscle aches, fatigue, cough, headache, and runny or stuffy nose. Some people may have vomiting and diarrhea, though this is more common in children than adults. Each year thousands of people in the [facility] die from flu, and many more are hospitalized. Flu vaccine prevents millions of illnesses and flu-related visits to the doctor each year. 2. Influenza vaccines CDC recommends everyone 10months of age and older get vaccinated every flu season. Children 6 months through 6years of age may need 2 doses during a single flu season. Everyone else needs only 1 dose each flu season. It takes about 2 weeks for protection to develop after vaccination. There are many flu viruses, and they are always changing. Each year a new flu vaccine is made to protect against three or four viruses that are likely to cause disease in the upcoming flu season.  Even when the vaccine doesnt exactly match these viruses, it may still provide some protection. Influenza vaccine does not cause flu. Influenza vaccine may be given at the same time as other vaccines. 3. Talk with your health care provider Tell your vaccine provider if the person getting the vaccine: 
 Has had an allergic reaction after a previous dose of influenza vaccine, or has any severe, life-threatening allergies.  Has ever had Guillain-Barré Syndrome (also called GBS). In some cases, your health care provider may decide to postpone influenza vaccination to a future visit. People with minor illnesses, such as a cold, may be vaccinated. People who are moderately or severely ill should usually wait until they recover before getting influenza vaccine. Your health care provider can give you more information. 4. Risks of a reaction  Soreness, redness, and swelling where shot is given, fever, muscle aches, and headache can happen after influenza vaccine.  There may be a very small increased risk of Guillain-Barré Syndrome (GBS) after inactivated influenza vaccine (the flu shot). [facility] children who get the flu shot along with pneumococcal vaccine (PCV13), and/or DTaP vaccine at the same time might be slightly more likely to have a seizure caused by fever. Tell your health care provider if a child who is getting flu vaccine has ever had a seizure. People sometimes faint after medical procedures, including vaccination. Tell your provider if you feel dizzy or have vision changes or ringing in the ears. As with any medicine, there is a very remote chance of a vaccine causing a severe allergic reaction, other serious injury, or death. 5. What if there is a serious problem? An allergic reaction could occur after the vaccinated person leaves the clinic.  If you see signs of a severe allergic reaction (hives, swelling of the face and throat, difficulty breathing, a fast heartbeat, dizziness, or weakness), call 9-1-1 and get the person to the nearest hospital. 
 
For other signs that concern you, call your health care provider. Adverse reactions should be reported to the Vaccine Adverse Event Reporting System (VAERS). Your health care provider will usually file this report, or you can do it yourself. Visit the VAERS website at www.vaers. hhs.gov or call 1-489.128.8143. VAERS is only for reporting reactions, and VAERS staff do not give medical advice. 6. The National Vaccine Injury Compensation Program 
 
The [facility] Vaccine Injury Compensation Program (VICP) is a federal program that was created to compensate people who may have been injured by certain vaccines. Visit the VICP website at www.hrsa.gov/vaccinecompensation or call 8-628.986.3019 to learn about the program and about filing a claim. There is a time limit to file a claim for compensation. 7. How can I learn more?  Ask your health care provider.  Call your local or state health department.  Contact the Centers for Disease Control and Prevention (CDC): 
- Call 1-253.969.6832 (1-800-CDC-INFO) or 
- Visit CDCs influenza website at www.cdc.gov/flu Vaccine Information Statement (Interim) Inactivated Influenza Vaccine 8/15/2019 
42 CELIA Vasquez 111QE-34 Department of Health and [employer] Centers for Disease Control and Prevention Office Use Only

## 2020-10-16 NOTE — PROGRESS NOTES
Chief Complaint   Patient presents with    Medication Evaluation     ff/u     Reviewed record in preparation for visit and have obtained necessary documentation. Identified pt with two pt identifiers(name and ). Health Maintenance Due   Topic    GLAUCOMA SCREENING Q2Y     Flu Vaccine (1)    Medicare Yearly Exam          Chief Complaint   Patient presents with    Medication Evaluation     ff/u        Wt Readings from Last 3 Encounters:   10/16/20 325 lb 6.4 oz (147.6 kg)   20 329 lb (149.2 kg)   01/15/20 330 lb 6.4 oz (149.9 kg)     Temp Readings from Last 3 Encounters:   10/16/20 96.9 °F (36.1 °C) (Temporal)   20 98.4 °F (36.9 °C) (Oral)   01/15/20 97.9 °F (36.6 °C) (Oral)     BP Readings from Last 3 Encounters:   10/16/20 110/70   01/15/20 120/70   19 114/62     Pulse Readings from Last 3 Encounters:   10/16/20 (!) 52   20 62   01/15/20 62           Learning Assessment:  :     Learning Assessment 2019 2018 2016 2016 3/14/2016 2016   PRIMARY LEARNER Patient Patient Patient Patient Patient Patient   HIGHEST LEVEL OF EDUCATION - PRIMARY LEARNER  > 4 YEARS OF COLLEGE - - - - -   BARRIERS PRIMARY LEARNER NONE - - NONE - -   CO-LEARNER CAREGIVER - No - No - -   PRIMARY LANGUAGE ENGLISH ENGLISH ENGLISH ENGLISH ENGLISH ENGLISH   LEARNER PREFERENCE PRIMARY LISTENING LISTENING LISTENING DEMONSTRATION LISTENING -   LEARNING SPECIAL TOPICS - - no no no -   ANSWERED BY pt Patient patient patient patient -   RELATIONSHIP SELF SELF SELF SELF SELF -   ASSESSMENT COMMENT - - none none none -       Depression Screening:  :     3 most recent PHQ Screens 1/15/2020   Little interest or pleasure in doing things Not at all   Feeling down, depressed, irritable, or hopeless Not at all   Total Score PHQ 2 0       Fall Risk Assessment:  :     Fall Risk Assessment, last 12 mths 1/15/2020   Able to walk? Yes   Fall in past 12 months?  No       Abuse Screening:  :     Abuse Screening Questionnaire 7/19/2018 9/5/2017   Do you ever feel afraid of your partner? N N   Are you in a relationship with someone who physically or mentally threatens you? N N   Is it safe for you to go home? Y Y       Coordination of Care Questionnaire:  :     1) Have you been to an emergency room, urgent care clinic since your last visit? no   Hospitalized since your last visit? no             2) Have you seen or consulted any other health care providers outside of [address] [place] [address] since your last visit? no  (Include any pap smears or colon screenings in this section.)    3) Do you have an Advance Directive on file? no    4) Are you interested in receiving information on Advance Directives? NO      Patient is accompanied by self I have received verbal consent from Fred Burger to discuss any/all medical information while they are present in the room. Reviewed record  In preparation for visit and have obtained necessary documentation.

## 2020-10-16 NOTE — PROGRESS NOTES
Follow Up Visit    Barney Tubbs is a 81 y.o. male. he presents for Medication Evaluation (ff/u)    Cardiovascular Review  The patient has hypertension and hyperlipidemia. He reports taking medications as instructed, no medication side effects noted. Diet and Lifestyle: generally follows a low fat low cholesterol diet, generally follows a low sodium diet. Lab review: labs reviewed and discussed with patient. Ordered new labs today. He has some ongoing urinary frequency. Will check PSA at this time    Discussed weight loss and the need to increase efforts in this regard. Patient Active Problem List   Diagnosis Code    Hypertension I10    Hyperlipidemia E78.5    Gout M10.9    Multinodular goiter E04.2    Melanoma ([facility] [address] C43.9    Hx of adenomatous colonic polyps Z86.010    Obesity, morbid ([facility] [address] E66.01         Prior to Admission medications    Medication Sig Start Date End Date Taking? Authorizing Provider   atorvastatin (LIPITOR) 10 mg tablet TAKE 1 TABLET BY MOUTH EVERY DAY 8/28/20  Yes Ghassan Sipmson MD   allopurinoL (ZYLOPRIM) 300 mg tablet TAKE 1 TABLET BY MOUTH EVERY DAY 8/28/20  Yes Ghassan Simpson MD   verapamil ER (CALAN-SR) 240 mg CR tablet TAKE 1 TABLET BY MOUTH TWICE A DAY 8/28/20  Yes Ghassan Simpson MD   valsartan (DIOVAN) 160 mg tablet TAKE 1 TABLET DAILY 5/1/20  Yes Ghassan Simpson MD   diclofenac (VOLTAREN) 1 % gel APPLY 1 GRAM TO AFFECTED AREA 3 4 TIMES PER DAY AS NEEDED. 3/21/20  Yes Provider, Historical   ketoconazole (NIZORAL) 2 % topical cream Apply to the area twice daily for rash 3/30/20  Yes Casey GRECO NP   triamcinolone acetonide (KENALOG) 0.1 % topical cream Apply  to affected area two (2) times a day. use thin layer 3/30/20  Yes Casey GRECO NP   meloxicam (MOBIC) 15 mg tablet Take 15 mg by mouth daily. 11/20/19  Yes Provider, Historical   MYRBETRIQ 25 mg ER tablet Take 25 mg by mouth daily.  9/26/19  Yes Provider, Historical   tamsulosin (FLOMAX) 0.4 mg capsule Take 0.4 mg by mouth daily. 9/5/19  Yes Provider, Historical   chlorhexidine (PERIDEX) 0.12 % solution as needed. 10/1/19  Yes Provider, Historical   cholecalciferol, vitamin D3, 2,000 unit tab Take  by mouth two (2) times a day. Yes Provider, Historical   Omega-3 Fatty Acids 300 mg cap Take  by mouth. Yes Provider, Historical   calcium 500 mg tab Take  by mouth. With potassium/ Currently not taking   Yes Provider, Historical   magnesium 250 mg tab Take 400 mg by mouth daily. Yes Provider, Historical   furosemide (LASIX) 20 mg tablet TAKE 1/2 TAB BY MOUTH DAILY AS NEEDED (SWELLING). 4/15/20   Richard Michaud MD   indomethacin SR (INDOCIN SR) 75 mg SR capsule TAKE 1 CAP BY MOUTH DAILY AS NEEDED (GOUT FLARE). 12/31/19   Richard Michaud MD         Health Maintenance   Topic Date Due    GLAUCOMA SCREENING Q2Y  10/23/2017    Flu Vaccine (1) 09/01/2020    Medicare Yearly Exam  10/07/2020    Lipid Screen  10/07/2021    Colonoscopy  05/29/2023    DTaP/Tdap/Td series (2 - Td) 11/24/2024    Hepatitis C Screening  Completed    Shingrix Vaccine Age 50>  Completed    Pneumococcal 65+ years  Completed       Review of Systems   Constitutional: Negative. Respiratory: Negative. Cardiovascular: Negative. Genitourinary: Positive for frequency. Visit Vitals  /70 (BP 1 Location: Left arm, BP Patient Position: Sitting)   Pulse (!) 52   Temp 96.9 °F (36.1 °C) (Temporal)   Resp 16   Ht 6' 1\" (1.854 m)   Wt 325 lb 6.4 oz (147.6 kg)   SpO2 96%   BMI 42.93 kg/m²       Physical Exam  Constitutional:       Appearance: He is well-developed. Cardiovascular:      Rate and Rhythm: Normal rate and regular rhythm. Pulmonary:      Effort: Pulmonary effort is normal.      Breath sounds: Normal breath sounds. ASSESSMENT/PLAN    Diagnoses and all orders for this visit:    1.  Essential hypertension  -     METABOLIC PANEL, COMPREHENSIVE  -     HEMOGLOBIN A1C WITH EAG  -     CBC WITH AUTOMATED DIFF    2. Pure hypercholesterolemia  -     LIPID PANEL    3. Pyuria  -     PSA, DIAGNOSTIC (PROSTATE SPECIFIC AG)    4. Needs flu shot  -     FLU (FLUAD QUAD INFLUENZA VACCINE,QUAD,ADJUVANTED)    5. Urinary frequency  -     HEMOGLOBIN A1C WITH EAG  -     PSA, DIAGNOSTIC (PROSTATE SPECIFIC AG)      Follow-up and Dispositions    · Return in about 6 months (around 4/16/2021) for Medicare Wellness Visit- 30 minute appointment.

## 2020-11-15 DIAGNOSIS — I10 ESSENTIAL HYPERTENSION: Chronic | ICD-10-CM

## 2020-11-16 RX ORDER — VALSARTAN 160 MG/1
TABLET ORAL
Qty: 90 TAB | Refills: 1 | Status: SHIPPED | OUTPATIENT
Start: 2020-11-16 | End: 2021-02-11 | Stop reason: SDUPTHER

## 2021-02-18 DIAGNOSIS — M10.00 IDIOPATHIC GOUT, UNSPECIFIED CHRONICITY, UNSPECIFIED SITE: Chronic | ICD-10-CM

## 2021-02-18 DIAGNOSIS — I10 ESSENTIAL HYPERTENSION: Chronic | ICD-10-CM

## 2021-02-18 RX ORDER — ALLOPURINOL 300 MG/1
TABLET ORAL
Qty: 90 TAB | Refills: 1 | Status: SHIPPED | OUTPATIENT
Start: 2021-02-18 | End: 2021-10-27

## 2021-02-18 RX ORDER — VERAPAMIL HYDROCHLORIDE 240 MG/1
TABLET, FILM COATED, EXTENDED RELEASE ORAL
Qty: 180 TAB | Refills: 1 | Status: SHIPPED | OUTPATIENT
Start: 2021-02-18 | End: 2021-10-27 | Stop reason: SDUPTHER

## 2021-02-22 DIAGNOSIS — E78.00 PURE HYPERCHOLESTEROLEMIA: Chronic | ICD-10-CM

## 2021-02-24 RX ORDER — ATORVASTATIN CALCIUM 10 MG/1
TABLET, FILM COATED ORAL
Qty: 90 TAB | Refills: 1 | Status: SHIPPED | OUTPATIENT
Start: 2021-02-24 | End: 2021-10-27 | Stop reason: SDUPTHER

## 2021-05-06 ENCOUNTER — HOSPITAL ENCOUNTER (OUTPATIENT)
Dept: LAB | Age: 71
Discharge: HOME | End: 2021-05-06
Payer: MEDICARE

## 2021-05-06 PROCEDURE — 84153 ASSAY OF PSA TOTAL: CPT

## 2021-05-06 PROCEDURE — 85025 COMPLETE CBC W/AUTO DIFF WBC: CPT

## 2021-05-06 PROCEDURE — 80053 COMPREHEN METABOLIC PANEL: CPT

## 2021-05-06 PROCEDURE — 83036 HEMOGLOBIN GLYCOSYLATED A1C: CPT

## 2021-05-06 PROCEDURE — 80061 LIPID PANEL: CPT

## 2021-05-07 LAB
ALBUMIN SERPL-MCNC: 4.5 G/DL (ref 3.8–4.8)
ALBUMIN/GLOB SERPL: 2.1 {RATIO} (ref 1.2–2.2)
ALP SERPL-CCNC: 93 IU/L (ref 39–117)
ALT SERPL-CCNC: 14 IU/L (ref 0–44)
AST SERPL-CCNC: 19 IU/L (ref 0–40)
BASOPHILS # BLD AUTO: 0.1 X10E3/UL (ref 0–0.2)
BASOPHILS NFR BLD AUTO: 1 %
BILIRUB SERPL-MCNC: 0.8 MG/DL (ref 0–1.2)
BUN SERPL-MCNC: 18 MG/DL (ref 8–27)
BUN/CREAT SERPL: 16 (ref 10–24)
CALCIUM SERPL-MCNC: 9.2 MG/DL (ref 8.6–10.2)
CHLORIDE SERPL-SCNC: 102 MMOL/L (ref 96–106)
CHOLEST SERPL-MCNC: 115 MG/DL (ref 100–199)
CO2 SERPL-SCNC: 25 MMOL/L (ref 20–29)
CREAT SERPL-MCNC: 1.1 MG/DL (ref 0.76–1.27)
EOSINOPHIL # BLD AUTO: 0.2 X10E3/UL (ref 0–0.4)
EOSINOPHIL NFR BLD AUTO: 3 %
ERYTHROCYTE [DISTWIDTH] IN BLOOD BY AUTOMATED COUNT: 13 % (ref 11.6–15.4)
EST. AVERAGE GLUCOSE BLD GHB EST-MCNC: 117 MG/DL
GLOBULIN SER CALC-MCNC: 2.1 G/DL (ref 1.5–4.5)
GLUCOSE SERPL-MCNC: 94 MG/DL (ref 65–99)
HBA1C MFR BLD: 5.7 % (ref 4.8–5.6)
HCT VFR BLD AUTO: 41.2 % (ref 37.5–51)
HDLC SERPL-MCNC: 41 MG/DL
HGB BLD-MCNC: 14.3 G/DL (ref 13–17.7)
IMM GRANULOCYTES # BLD AUTO: 0 X10E3/UL (ref 0–0.1)
IMM GRANULOCYTES NFR BLD AUTO: 0 %
LDLC SERPL CALC-MCNC: 58 MG/DL (ref 0–99)
LYMPHOCYTES # BLD AUTO: 1.8 X10E3/UL (ref 0.7–3.1)
LYMPHOCYTES NFR BLD AUTO: 30 %
MCH RBC QN AUTO: 33.2 PG (ref 26.6–33)
MCHC RBC AUTO-ENTMCNC: 34.7 G/DL (ref 31.5–35.7)
MCV RBC AUTO: 96 FL (ref 79–97)
MONOCYTES # BLD AUTO: 0.5 X10E3/UL (ref 0.1–0.9)
MONOCYTES NFR BLD AUTO: 8 %
NEUTROPHILS # BLD AUTO: 3.4 X10E3/UL (ref 1.4–7)
NEUTROPHILS NFR BLD AUTO: 58 %
PLATELET # BLD AUTO: 197 X10E3/UL (ref 150–450)
POTASSIUM SERPL-SCNC: 4.8 MMOL/L (ref 3.5–5.2)
PROT SERPL-MCNC: 6.6 G/DL (ref 6–8.5)
PSA SERPL-MCNC: 0.4 NG/ML (ref 0–4)
RBC # BLD AUTO: 4.31 X10E6/UL (ref 4.14–5.8)
SODIUM SERPL-SCNC: 140 MMOL/L (ref 134–144)
SPECIMEN STATUS REPORT, ROLRST: NORMAL
TRIGL SERPL-MCNC: 81 MG/DL (ref 0–149)
VLDLC SERPL CALC-MCNC: 16 MG/DL (ref 5–40)
WBC # BLD AUTO: 5.9 X10E3/UL (ref 3.4–10.8)

## 2021-05-10 ENCOUNTER — OFFICE VISIT (OUTPATIENT)
Dept: INTERNAL MEDICINE CLINIC | Age: 71
End: 2021-05-10
Payer: MEDICARE

## 2021-05-10 VITALS
BODY MASS INDEX: 42.66 KG/M2 | OXYGEN SATURATION: 97 % | HEART RATE: 63 BPM | TEMPERATURE: 97.5 F | SYSTOLIC BLOOD PRESSURE: 113 MMHG | RESPIRATION RATE: 16 BRPM | DIASTOLIC BLOOD PRESSURE: 66 MMHG | HEIGHT: 72 IN | WEIGHT: 315 LBS

## 2021-05-10 DIAGNOSIS — Z13.39 SCREENING FOR ALCOHOLISM: ICD-10-CM

## 2021-05-10 DIAGNOSIS — Z00.00 MEDICARE ANNUAL WELLNESS VISIT, SUBSEQUENT: Primary | ICD-10-CM

## 2021-05-10 DIAGNOSIS — L30.9 DERMATITIS: ICD-10-CM

## 2021-05-10 PROCEDURE — G8754 DIAS BP LESS 90: HCPCS | Performed by: INTERNAL MEDICINE

## 2021-05-10 PROCEDURE — G8427 DOCREV CUR MEDS BY ELIG CLIN: HCPCS | Performed by: INTERNAL MEDICINE

## 2021-05-10 PROCEDURE — G8510 SCR DEP NEG, NO PLAN REQD: HCPCS | Performed by: INTERNAL MEDICINE

## 2021-05-10 PROCEDURE — G8536 NO DOC ELDER MAL SCRN: HCPCS | Performed by: INTERNAL MEDICINE

## 2021-05-10 PROCEDURE — 1101F PT FALLS ASSESS-DOCD LE1/YR: CPT | Performed by: INTERNAL MEDICINE

## 2021-05-10 PROCEDURE — G0439 PPPS, SUBSEQ VISIT: HCPCS | Performed by: INTERNAL MEDICINE

## 2021-05-10 PROCEDURE — G0442 ANNUAL ALCOHOL SCREEN 15 MIN: HCPCS | Performed by: INTERNAL MEDICINE

## 2021-05-10 PROCEDURE — G8752 SYS BP LESS 140: HCPCS | Performed by: INTERNAL MEDICINE

## 2021-05-10 PROCEDURE — G8417 CALC BMI ABV UP PARAM F/U: HCPCS | Performed by: INTERNAL MEDICINE

## 2021-05-10 PROCEDURE — 3017F COLORECTAL CA SCREEN DOC REV: CPT | Performed by: INTERNAL MEDICINE

## 2021-05-10 RX ORDER — KETOCONAZOLE 20 MG/G
CREAM TOPICAL
Qty: 60 G | Refills: 0 | Status: SHIPPED | OUTPATIENT
Start: 2021-05-10 | End: 2021-06-22 | Stop reason: ALTCHOICE

## 2021-05-10 NOTE — PROGRESS NOTES
Verified name and birth date for privacy precautions. Chart reviewed in preparation for today's visit. Chief Complaint   Patient presents with   [facility] Maintenance Due   Topic    Medicare Yearly Exam          Wt Readings from Last 3 Encounters:   05/10/21 331 lb (150.1 kg)   10/16/20 325 lb 6.4 oz (147.6 kg)   03/30/20 329 lb (149.2 kg)     Temp Readings from Last 3 Encounters:   05/10/21 97.5 °F (36.4 °C) (Temporal)   10/16/20 96.9 °F (36.1 °C) (Temporal)   03/30/20 98.4 °F (36.9 °C) (Oral)     BP Readings from Last 3 Encounters:   05/10/21 113/66   10/16/20 110/70   01/15/20 120/70     Pulse Readings from Last 3 Encounters:   05/10/21 63   10/16/20 (!) 52   03/30/20 62         Learning Assessment:  :     Learning Assessment 5/20/2019 1/9/2018 12/21/2016 6/14/2016 3/14/2016 2/24/2016   PRIMARY LEARNER Patient Patient Patient Patient Patient Patient   HIGHEST LEVEL OF EDUCATION - PRIMARY LEARNER  > 4 YEARS OF COLLEGE - - - - -   BARRIERS PRIMARY LEARNER NONE - - NONE - -   CO-LEARNER CAREGIVER - No - No - -   PRIMARY LANGUAGE ENGLISH ENGLISH ENGLISH ENGLISH ENGLISH ENGLISH   LEARNER PREFERENCE PRIMARY LISTENING LISTENING LISTENING DEMONSTRATION LISTENING -   LEARNING SPECIAL TOPICS - - no no no -   ANSWERED BY pt Patient patient patient patient -   RELATIONSHIP SELF SELF SELF SELF SELF -   ASSESSMENT COMMENT - - none none none -       Depression Screening:  :     3 most recent PHQ Screens 5/10/2021   Little interest or pleasure in doing things Not at all   Feeling down, depressed, irritable, or hopeless Not at all   Total Score PHQ 2 0       Fall Risk Assessment:  :     Fall Risk Assessment, last 12 mths 5/10/2021   Able to walk? Yes   Fall in past 12 months? 0   Do you feel unsteady? 0   Are you worried about falling 0       Abuse Screening:  :     Abuse Screening Questionnaire 5/10/2021 7/19/2018 9/5/2017   Do you ever feel afraid of your partner?  N N N   Are you in a relationship with someone who physically or mentally threatens you? N N N   Is it safe for you to go home?  Delmi Garcia

## 2021-05-10 NOTE — PROGRESS NOTES
This is the Subsequent Medicare Annual Wellness Exam, performed 12 months or more after the Initial AWV or the last Subsequent AWV    I have reviewed the patient's medical history in detail and updated the computerized patient record. He is working on exercising    Eyes feeling \"gritty\" after cutting grass--Discussed this is likely due to pollen. Right axilla irritation. Treated with ketoconazole in the past.  He also sees dermatology. He will follow up with them this week regarding previous treatment of skin cancer. He will bring up the irritation at his axilla as well. Assessment/Plan   Education and counseling provided:  Are appropriate based on today's review and evaluation    1. Medicare annual wellness visit, subsequent  2. Screening for alcoholism  -     [place] ANNUAL ALCOHOL SCREEN 15 MIN  3. Dermatitis  -     ketoconazole (NIZORAL) 2 % topical cream; Apply to the area twice daily for rash, Normal, Disp-60 g, R-0  4. Body mass index (BMI)40.0-44.9, adult ([facility])       Depression Risk Factor Screening     3 most recent PHQ Screens 5/10/2021   Little interest or pleasure in doing things Not at all   Feeling down, depressed, irritable, or hopeless Not at all   Total Score PHQ 2 0       Alcohol Risk Screen    Do you average more than 1 drink per night or more than 7 drinks a week: No    In the past three months have you have had more than 4 drinks containing alcohol on one occasion: No        Functional Ability and Level of Safety    Hearing: Hearing is good. Activities of Daily Living: The home contains: no safety equipment. Patient does total self care      Ambulation: with no difficulty     Fall Risk:  Fall Risk Assessment, last 12 mths 5/10/2021   Able to walk? Yes   Fall in past 12 months? 0   Do you feel unsteady?  0   Are you worried about falling 0      Abuse Screen:  Patient is not abused       Cognitive Screening    Has your family/caregiver stated any concerns about your memory: yes - he has some issues with short term memory         Health Maintenance Due   There are no preventive care reminders to display for this patient. Patient Care Team   Patient Care Team:  Dinora Valderrama MD as PCP - General (Internal Medicine)  Dinora Valderrama MD as PCP - [facility]  Renetta Cyr MD (Internal Medicine)    History     Patient Active Problem List   Diagnosis Code    Hypertension I10    Hyperlipidemia E78.5    Gout M10.9    Multinodular goiter E04.2    Melanoma ([address] C43.9    Hx of adenomatous colonic polyps Z86.010    Obesity, morbid ([address] E66.01     Past Medical History:   Diagnosis Date    Cancer ([address]     melanoma     Gout     Gout     Hypercholesterolemia     Hypertension     Skin cancer     melanoma (left post shoulder) and BCC x 2 (scalp, right popliteal)    Sun-damaged skin     Thyroid disease       Past Surgical History:   Procedure Laterality Date    COLONOSCOPY N/A 5/29/2018    COLONOSCOPY [facility] Yasir Hurtado [facility] at [facility] ENDOSCOPY    HX HEENT Left     partial thyroidectomy    HX ORTHOPAEDIC  1969    Left knee ACL     HX ORTHOPAEDIC  1979    dupytrens contractures    HX THYROIDECTOMY  05/2013    HX TONSILLECTOMY  1950    SKIN TISSUE PROCEDURE UNLISTED       Current Outpatient Medications   Medication Sig Dispense Refill    atorvastatin (LIPITOR) 10 mg tablet TAKE 1 TABLET BY MOUTH EVERY DAY 90 Tab 1    allopurinoL (ZYLOPRIM) 300 mg tablet TAKE 1 TABLET BY MOUTH EVERY DAY 90 Tab 1    verapamil ER (CALAN-SR) 240 mg CR tablet TAKE 1 TABLET BY MOUTH TWICE A  Tab 1    valsartan (DIOVAN) 160 mg tablet Take 1 Tab by mouth daily. 90 Tab 1    furosemide (LASIX) 20 mg tablet TAKE 1/2 TAB BY MOUTH DAILY AS NEEDED (SWELLING). 30 Tab 1    ketoconazole (NIZORAL) 2 % topical cream Apply to the area twice daily for rash 60 g 2    indomethacin SR (INDOCIN SR) 75 mg SR capsule TAKE 1 CAP BY MOUTH DAILY AS NEEDED (GOUT FLARE).  30 Cap 1    meloxicam (MOBIC) 15 mg tablet Take 15 mg by mouth daily.  MYRBETRIQ 25 mg ER tablet Take 25 mg by mouth daily.  tamsulosin (FLOMAX) 0.4 mg capsule Take 0.4 mg by mouth daily. 3    magnesium 250 mg tab Take 400 mg by mouth daily.  triamcinolone acetonide (KENALOG) 0.1 % topical cream Apply  to affected area two (2) times a day. use thin layer 60 g 2     No Known Allergies    Family History   Problem Relation Age of Onset    Diabetes Mother     Heart Disease Mother         heart valve replaced    Hypertension Father     Colon Polyps Father     Cancer Brother         Stomach      Social History     Tobacco Use    Smoking status: Never Smoker    Smokeless tobacco: Never Used   Substance Use Topics    Alcohol use:  Yes     Alcohol/week: 1.0 standard drinks     Types: 1 Cans of beer per week         Malia Taylor MD

## 2021-05-10 NOTE — PATIENT INSTRUCTIONS
Medicare Wellness Visit, Male The best way to live healthy is to have a lifestyle where you eat a well-balanced diet, exercise regularly, limit alcohol use, and quit all forms of tobacco/nicotine, if applicable. Regular preventive services are another way to keep healthy. Preventive services (vaccines, screening tests, monitoring & exams) can help personalize your care plan, which helps you manage your own care. Screening tests can find health problems at the earliest stages, when they are easiest to treat. [facility] follows the current, evidence-based guidelines published by the [facility] Grzegorz ([facility]) when recommending preventive services for our patients. Because we follow these guidelines, sometimes recommendations change over time as research supports it. (For example, a prostate screening blood test is no longer routinely recommended for men with no symptoms). Of course, you and your doctor may decide to screen more often for some diseases, based on your risk and co-morbidities (chronic disease you are already diagnosed with). Preventive services for you include: - Medicare offers their members a free annual wellness visit, which is time for you and your primary care provider to discuss and plan for your preventive service needs. Take advantage of this benefit every year! 
-All adults over age 72 should receive the recommended pneumonia vaccines. Current USPSTF guidelines recommend a series of two vaccines for the best pneumonia protection.  
-All adults should have a flu vaccine yearly and tetanus vaccine every 10 years. 
-All adults age 48 and older should receive the shingles vaccines (series of two vaccines).       
-All adults age 38-68 who are overweight should have a diabetes screening test once every three years.  
-Other screening tests & preventive services for persons with diabetes include: an eye exam to screen for diabetic retinopathy, a kidney function test, a foot exam, and stricter control over your cholesterol.  
-Cardiovascular screening for adults with routine risk involves an electrocardiogram (ECG) at intervals determined by the provider.  
-Colorectal cancer screening should be done for adults age 54-65 with no increased risk factors for colorectal cancer. There are a number of acceptable methods of screening for this type of cancer. Each test has its own benefits and drawbacks. Discuss with your provider what is most appropriate for you during your annual wellness visit. The different tests include: colonoscopy (considered the best screening method), a fecal occult blood test, a fecal DNA test, and sigmoidoscopy. 
-All adults born between [facility] should be screened once for Hepatitis C. 
-An Abdominal Aortic Aneurysm (AAA) Screening is recommended for men age 73-68 who has ever smoked in their lifetime. Here is a list of your current Health Maintenance items (your personalized list of preventive services) with a due date: There are no preventive care reminders to display for this patient.

## 2021-06-22 ENCOUNTER — OFFICE VISIT (OUTPATIENT)
Dept: INTERNAL MEDICINE CLINIC | Age: 71
End: 2021-06-22
Payer: MEDICARE

## 2021-06-22 VITALS
WEIGHT: 315 LBS | HEIGHT: 72 IN | SYSTOLIC BLOOD PRESSURE: 102 MMHG | TEMPERATURE: 98.6 F | DIASTOLIC BLOOD PRESSURE: 66 MMHG | BODY MASS INDEX: 42.66 KG/M2 | RESPIRATION RATE: 18 BRPM | HEART RATE: 60 BPM | OXYGEN SATURATION: 96 %

## 2021-06-22 DIAGNOSIS — R73.09 ELEVATED HEMOGLOBIN A1C: ICD-10-CM

## 2021-06-22 DIAGNOSIS — D17.1 LIPOMA OF ABDOMINAL WALL: ICD-10-CM

## 2021-06-22 DIAGNOSIS — I10 ESSENTIAL HYPERTENSION: Primary | ICD-10-CM

## 2021-06-22 DIAGNOSIS — E78.00 PURE HYPERCHOLESTEROLEMIA: ICD-10-CM

## 2021-06-22 PROCEDURE — G0463 HOSPITAL OUTPT CLINIC VISIT: HCPCS | Performed by: INTERNAL MEDICINE

## 2021-06-22 PROCEDURE — G8754 DIAS BP LESS 90: HCPCS | Performed by: INTERNAL MEDICINE

## 2021-06-22 PROCEDURE — G8417 CALC BMI ABV UP PARAM F/U: HCPCS | Performed by: INTERNAL MEDICINE

## 2021-06-22 PROCEDURE — 99213 OFFICE O/P EST LOW 20 MIN: CPT | Performed by: INTERNAL MEDICINE

## 2021-06-22 PROCEDURE — G8427 DOCREV CUR MEDS BY ELIG CLIN: HCPCS | Performed by: INTERNAL MEDICINE

## 2021-06-22 PROCEDURE — G8510 SCR DEP NEG, NO PLAN REQD: HCPCS | Performed by: INTERNAL MEDICINE

## 2021-06-22 PROCEDURE — 3017F COLORECTAL CA SCREEN DOC REV: CPT | Performed by: INTERNAL MEDICINE

## 2021-06-22 PROCEDURE — 1101F PT FALLS ASSESS-DOCD LE1/YR: CPT | Performed by: INTERNAL MEDICINE

## 2021-06-22 PROCEDURE — G8752 SYS BP LESS 140: HCPCS | Performed by: INTERNAL MEDICINE

## 2021-06-22 PROCEDURE — G8536 NO DOC ELDER MAL SCRN: HCPCS | Performed by: INTERNAL MEDICINE

## 2021-06-22 RX ORDER — CLOTRIMAZOLE AND BETAMETHASONE DIPROPIONATE 10; .64 MG/G; MG/G
CREAM TOPICAL
COMMUNITY
Start: 2021-06-09 | End: 2022-09-19

## 2021-06-22 NOTE — PROGRESS NOTES
Chief Complaint   Patient presents with    Blood Pressure Check     one mo followup          1. Have you been to the ER, urgent care clinic since your last visit? Hospitalized since your last visit? No    2. Have you seen or consulted any other health care providers outside of the [address] [place] [address] since your last visit? Include any pap smears or colon screening.  No

## 2021-06-22 NOTE — PROGRESS NOTES
Follow Up Visit    Raad Rhodes is a 78 y.o. male. he presents for Blood Pressure Check (one mo followup )    Cardiovascular Review  The patient has hypertension. He reports taking medications as instructed, no medication side effects noted. Diet and Lifestyle: generally follows a low fat low cholesterol diet, generally follows a low sodium diet, exercises regularly. Lab review: orders written for new lab studies as appropriate; see orders. He also notes an area of swelling at his lateral abdomen. He has lost some weight recently. It is not tender however he has recently become aware of it. Patient Active Problem List   Diagnosis Code    Hypertension I10    Hyperlipidemia E78.5    Gout M10.9    Multinodular goiter E04.2    Melanoma ([facility] C43.9    Hx of adenomatous colonic polyps Z86.010    Obesity, morbid ([facility] E66.01         Prior to Admission medications    Medication Sig Start Date End Date Taking? Authorizing Provider   clotrimazole-betamethasone (LOTRISONE) topical cream  6/9/21  Yes Provider, Historical   atorvastatin (LIPITOR) 10 mg tablet TAKE 1 TABLET BY MOUTH EVERY DAY 2/24/21  Yes Grey Hobson MD   allopurinoL (ZYLOPRIM) 300 mg tablet TAKE 1 TABLET BY MOUTH EVERY DAY 2/18/21  Yes Grey Hobson MD   verapamil ER (CALAN-SR) 240 mg CR tablet TAKE 1 TABLET BY MOUTH TWICE A DAY 2/18/21  Yes Grey Hobson MD   valsartan (DIOVAN) 160 mg tablet Take 1 Tab by mouth daily. 2/12/21  Yes Grey Hobson MD   furosemide (LASIX) 20 mg tablet TAKE 1/2 TAB BY MOUTH DAILY AS NEEDED (SWELLING). 4/15/20  Yes Grey Hobson MD   triamcinolone acetonide (KENALOG) 0.1 % topical cream Apply  to affected area two (2) times a day. use thin layer 3/30/20  Yes Myra GRECO NP   indomethacin SR (INDOCIN SR) 75 mg SR capsule TAKE 1 CAP BY MOUTH DAILY AS NEEDED (GOUT FLARE). 12/31/19  Yes Grey Hobson MD   MYRBETRIQ 25 mg ER tablet Take 25 mg by mouth daily.  9/26/19  Yes Provider, Historical   tamsulosin (FLOMAX) 0.4 mg capsule Take 0.4 mg by mouth daily. 9/5/19  Yes Provider, Historical   ketoconazole (NIZORAL) 2 % topical cream Apply to the area twice daily for rash 5/10/21 6/22/21  Naman Izaguirre MD   meloxicam [facility]) 15 mg tablet Take 15 mg by mouth daily. 11/20/19 6/22/21  Provider, Historical   magnesium 250 mg tab Take 400 mg by mouth daily. 6/22/21  Provider, Historical         Health Maintenance   Topic Date Due    A1C test (Diabetic or Prediabetic)  05/06/2022    Lipid Screen  05/06/2022    Medicare Yearly Exam  05/11/2022    Colorectal Cancer Screening Combo  05/29/2023    DTaP/Tdap/Td series (2 - Td or Tdap) 11/24/2024    Hepatitis C Screening  Completed    Shingrix Vaccine Age 50>  Completed    Flu Vaccine  Completed    COVID-19 Vaccine  Completed    Pneumococcal 65+ years  Completed       Review of Systems   Constitutional: Negative. Respiratory: Negative. Cardiovascular: Negative. Gastrointestinal: Negative. Visit Vitals  /66 (BP 1 Location: Left upper arm, BP Patient Position: Sitting, BP Cuff Size: Large adult)   Pulse 60   Temp 98.6 °F (37 °C) (Temporal)   Resp 18   Ht 6' (1.829 m)   Wt 330 lb (149.7 kg)   SpO2 96%   BMI 44.76 kg/m²       Physical Exam  Constitutional:       Appearance: Normal appearance. Cardiovascular:      Rate and Rhythm: Normal rate and regular rhythm. Pulses: Normal pulses. Heart sounds: Normal heart sounds. Abdominal:      General: Bowel sounds are normal.      Palpations: Abdomen is soft. Comments: Rubbery palpable abnormality along the left lateral abdomen--appears consistent with a lipoma   Neurological:      Mental Status: He is alert. ASSESSMENT/PLAN    Diagnoses and all orders for this visit:    1. Essential hypertension  -     LIPID PANEL; Future    2. Pure hypercholesterolemia  -     CBC WITH AUTOMATED DIFF; Future  -     METABOLIC PANEL, COMPREHENSIVE; Future    3.  Elevated hemoglobin A1c  -     HEMOGLOBIN A1C WITH EAG; Future    4. Lipoma of abdominal wall      Follow-up and Dispositions    · Return in about 5 months (around 11/22/2021) for Follow up.

## 2021-10-13 LAB
ALBUMIN SERPL-MCNC: 4.2 G/DL (ref 3.5–5)
ALBUMIN/GLOB SERPL: 1.4 {RATIO} (ref 1.1–2.2)
ALP SERPL-CCNC: 89 U/L (ref 45–117)
ALT SERPL-CCNC: 38 U/L (ref 12–78)
ANION GAP SERPL CALC-SCNC: 4 MMOL/L (ref 5–15)
AST SERPL-CCNC: 27 U/L (ref 15–37)
BASOPHILS # BLD: 0.1 K/UL (ref 0–0.1)
BASOPHILS NFR BLD: 1 % (ref 0–1)
BILIRUB SERPL-MCNC: 0.8 MG/DL (ref 0.2–1)
BUN SERPL-MCNC: 16 MG/DL (ref 6–20)
BUN/CREAT SERPL: 11 (ref 12–20)
CALCIUM SERPL-MCNC: 9.1 MG/DL (ref 8.5–10.1)
CHLORIDE SERPL-SCNC: 106 MMOL/L (ref 97–108)
CHOLEST SERPL-MCNC: 122 MG/DL
CO2 SERPL-SCNC: 28 MMOL/L (ref 21–32)
CREAT SERPL-MCNC: 1.5 MG/DL (ref 0.7–1.3)
DIFFERENTIAL METHOD BLD: NORMAL
EOSINOPHIL # BLD: 0.1 K/UL (ref 0–0.4)
EOSINOPHIL NFR BLD: 2 % (ref 0–7)
ERYTHROCYTE [DISTWIDTH] IN BLOOD BY AUTOMATED COUNT: 13.3 % (ref 11.5–14.5)
EST. AVERAGE GLUCOSE BLD GHB EST-MCNC: 114 MG/DL
GLOBULIN SER CALC-MCNC: 3 G/DL (ref 2–4)
GLUCOSE SERPL-MCNC: 92 MG/DL (ref 65–100)
HBA1C MFR BLD: 5.6 % (ref 4–5.6)
HCT VFR BLD AUTO: 41.1 % (ref 36.6–50.3)
HDLC SERPL-MCNC: 53 MG/DL
HDLC SERPL: 2.3 {RATIO} (ref 0–5)
HGB BLD-MCNC: 13.6 G/DL (ref 12.1–17)
IMM GRANULOCYTES # BLD AUTO: 0 K/UL (ref 0–0.04)
IMM GRANULOCYTES NFR BLD AUTO: 0 % (ref 0–0.5)
LDLC SERPL CALC-MCNC: 53.6 MG/DL (ref 0–100)
LYMPHOCYTES # BLD: 2.1 K/UL (ref 0.8–3.5)
LYMPHOCYTES NFR BLD: 26 % (ref 12–49)
MCH RBC QN AUTO: 32.8 PG (ref 26–34)
MCHC RBC AUTO-ENTMCNC: 33.1 G/DL (ref 30–36.5)
MCV RBC AUTO: 99 FL (ref 80–99)
MONOCYTES # BLD: 0.6 K/UL (ref 0–1)
MONOCYTES NFR BLD: 8 % (ref 5–13)
NEUTS SEG # BLD: 5.2 K/UL (ref 1.8–8)
NEUTS SEG NFR BLD: 63 % (ref 32–75)
NRBC # BLD: 0 K/UL (ref 0–0.01)
NRBC BLD-RTO: 0 PER 100 WBC
PLATELET # BLD AUTO: 218 K/UL (ref 150–400)
PMV BLD AUTO: 10.1 FL (ref 8.9–12.9)
POTASSIUM SERPL-SCNC: 4.5 MMOL/L (ref 3.5–5.1)
PROT SERPL-MCNC: 7.2 G/DL (ref 6.4–8.2)
RBC # BLD AUTO: 4.15 M/UL (ref 4.1–5.7)
SODIUM SERPL-SCNC: 138 MMOL/L (ref 136–145)
TRIGL SERPL-MCNC: 77 MG/DL (ref ?–150)
VLDLC SERPL CALC-MCNC: 15.4 MG/DL
WBC # BLD AUTO: 8.1 K/UL (ref 4.1–11.1)

## 2021-10-27 ENCOUNTER — OFFICE VISIT (OUTPATIENT)
Dept: INTERNAL MEDICINE CLINIC | Age: 71
End: 2021-10-27
Payer: MEDICARE

## 2021-10-27 VITALS
BODY MASS INDEX: 42.66 KG/M2 | SYSTOLIC BLOOD PRESSURE: 130 MMHG | WEIGHT: 315 LBS | DIASTOLIC BLOOD PRESSURE: 80 MMHG | HEIGHT: 72 IN | HEART RATE: 63 BPM | RESPIRATION RATE: 16 BRPM | TEMPERATURE: 97.5 F | OXYGEN SATURATION: 96 %

## 2021-10-27 DIAGNOSIS — R79.89 ELEVATED SERUM CREATININE: Primary | ICD-10-CM

## 2021-10-27 DIAGNOSIS — D69.2 PIGMENTED PURPURA (HCC): ICD-10-CM

## 2021-10-27 DIAGNOSIS — I10 ESSENTIAL HYPERTENSION: Chronic | ICD-10-CM

## 2021-10-27 DIAGNOSIS — M10.00 IDIOPATHIC GOUT, UNSPECIFIED CHRONICITY, UNSPECIFIED SITE: Chronic | ICD-10-CM

## 2021-10-27 DIAGNOSIS — C43.9 MALIGNANT MELANOMA, UNSPECIFIED SITE (HCC): ICD-10-CM

## 2021-10-27 DIAGNOSIS — E78.00 PURE HYPERCHOLESTEROLEMIA: Chronic | ICD-10-CM

## 2021-10-27 DIAGNOSIS — R60.0 LOWER EXTREMITY EDEMA: ICD-10-CM

## 2021-10-27 PROCEDURE — 99214 OFFICE O/P EST MOD 30 MIN: CPT | Performed by: INTERNAL MEDICINE

## 2021-10-27 PROCEDURE — G8427 DOCREV CUR MEDS BY ELIG CLIN: HCPCS | Performed by: INTERNAL MEDICINE

## 2021-10-27 PROCEDURE — G8536 NO DOC ELDER MAL SCRN: HCPCS | Performed by: INTERNAL MEDICINE

## 2021-10-27 PROCEDURE — 1101F PT FALLS ASSESS-DOCD LE1/YR: CPT | Performed by: INTERNAL MEDICINE

## 2021-10-27 PROCEDURE — G8417 CALC BMI ABV UP PARAM F/U: HCPCS | Performed by: INTERNAL MEDICINE

## 2021-10-27 PROCEDURE — G8510 SCR DEP NEG, NO PLAN REQD: HCPCS | Performed by: INTERNAL MEDICINE

## 2021-10-27 PROCEDURE — 3017F COLORECTAL CA SCREEN DOC REV: CPT | Performed by: INTERNAL MEDICINE

## 2021-10-27 PROCEDURE — G8752 SYS BP LESS 140: HCPCS | Performed by: INTERNAL MEDICINE

## 2021-10-27 PROCEDURE — G8754 DIAS BP LESS 90: HCPCS | Performed by: INTERNAL MEDICINE

## 2021-10-27 PROCEDURE — G0463 HOSPITAL OUTPT CLINIC VISIT: HCPCS | Performed by: INTERNAL MEDICINE

## 2021-10-27 RX ORDER — ALLOPURINOL 300 MG/1
300 TABLET ORAL DAILY
Qty: 90 TABLET | Refills: 3 | Status: SHIPPED | COMMUNITY
Start: 2021-10-27 | End: 2022-09-09 | Stop reason: SDUPTHER

## 2021-10-27 RX ORDER — FUROSEMIDE 20 MG/1
TABLET ORAL
Qty: 45 TABLET | Refills: 3 | Status: SHIPPED | COMMUNITY
Start: 2021-10-27

## 2021-10-27 RX ORDER — ATORVASTATIN CALCIUM 10 MG/1
10 TABLET, FILM COATED ORAL DAILY
Qty: 90 TABLET | Refills: 3 | Status: SHIPPED | COMMUNITY
Start: 2021-10-27 | End: 2022-09-09 | Stop reason: SDUPTHER

## 2021-10-27 RX ORDER — VALSARTAN 160 MG/1
160 TABLET ORAL DAILY
Qty: 90 TABLET | Refills: 3 | Status: SHIPPED | COMMUNITY
Start: 2021-10-27 | End: 2022-09-09 | Stop reason: SDUPTHER

## 2021-10-27 RX ORDER — INDOMETHACIN 75 MG/1
75 CAPSULE, EXTENDED RELEASE ORAL
Qty: 30 CAPSULE | Refills: 3 | Status: SHIPPED | COMMUNITY
Start: 2021-10-27

## 2021-10-27 RX ORDER — VERAPAMIL HYDROCHLORIDE 240 MG/1
240 TABLET, FILM COATED, EXTENDED RELEASE ORAL 2 TIMES DAILY
Qty: 180 TABLET | Refills: 3 | Status: SHIPPED | COMMUNITY
Start: 2021-10-27 | End: 2022-09-09 | Stop reason: SDUPTHER

## 2021-10-27 NOTE — PROGRESS NOTES
Follow Up Visit    Arcadio Granado is a 73 y.o. male. he presents for Medication Refill      Cardiovascular Review  The patient has hypertension and hyperlipidemia. He reports taking medications as instructed, no medication side effects noted. Diet and Lifestyle: generally follows a low fat low cholesterol diet, generally follows a low sodium diet. Lab review: orders written for new lab studies as appropriate; see orders. Endocrine Review  He is seen for gout. Since last visit: no recent flares. He reports medication compliance: compliant all of the time. Medication side effects: none. Diet and Lifestyle: not attempting to follow a low fat, low cholesterol diet, exercises sporadically, generally follows a good diet, not following a good diet. orders written for new lab studies as appropriate; see orders. He continues dermatology follow up. He has regular visits with his dermatologist.     Patient Active Problem List   Diagnosis Code    Hypertension I10    Hyperlipidemia E78.5    Gout M10.9    Multinodular goiter E04.2    Melanoma ([facility] [address] C43.9    Hx of adenomatous colonic polyps Z86.010    Obesity, morbid ([facility] E66.01         Prior to Admission medications    Medication Sig Start Date End Date Taking? Authorizing Provider   atorvastatin (LIPITOR) 10 mg tablet Take 1 Tablet by mouth daily. 10/27/21  Yes Damian Mccain MD   allopurinoL (ZYLOPRIM) 300 mg tablet Take 1 Tablet by mouth daily. 10/27/21  Yes Damian Mccain MD   verapamil ER (CALAN-SR) 240 mg CR tablet Take 1 Tablet by mouth two (2) times a day. 10/27/21  Yes Damian Mccain MD   valsartan (DIOVAN) 160 mg tablet Take 1 Tablet by mouth daily. 10/27/21  Yes Damian Mccain MD   furosemide (LASIX) 20 mg tablet TAKE 1/2 TAB BY MOUTH DAILY AS NEEDED (SWELLING). 10/27/21  Yes Damian Mccain MD   indomethacin SR (INDOCIN SR) 75 mg SR capsule Take 1 Capsule by mouth daily as needed (gout flare).  10/27/21  Yes Damian Mccain MD   triamcinolone acetonide (KENALOG) 0.1 % topical cream Apply  to affected area two (2) times a day. use thin layer 3/30/20  Yes Carolina Ervin NP   MYRBETRIQ 25 mg ER tablet Take 25 mg by mouth daily. 9/26/19  Yes Provider, Historical   tamsulosin (FLOMAX) 0.4 mg capsule Take 0.4 mg by mouth daily. 9/5/19  Yes Provider, Historical   clotrimazole-betamethasone (LOTRISONE) topical cream  6/9/21   Provider, Historical   atorvastatin (LIPITOR) 10 mg tablet TAKE 1 TABLET BY MOUTH EVERY DAY 2/24/21 10/27/21  January Oliver MD   allopurinoL (ZYLOPRIM) 300 mg tablet TAKE 1 TABLET BY MOUTH EVERY DAY 2/18/21 10/27/21  January Oliver MD   verapamil ER (CALAN-SR) 240 mg CR tablet TAKE 1 TABLET BY MOUTH TWICE A DAY 2/18/21 10/27/21  January Oliver MD   valsartan (DIOVAN) 160 mg tablet Take 1 Tab by mouth daily. 2/12/21 10/27/21  January Oliver MD   furosemide (LASIX) 20 mg tablet TAKE 1/2 TAB BY MOUTH DAILY AS NEEDED (SWELLING). 4/15/20 10/27/21  January Oliver MD   indomethacin SR (INDOCIN SR) 75 mg SR capsule TAKE 1 CAP BY MOUTH DAILY AS NEEDED (GOUT FLARE). 12/31/19 10/27/21  January Oliver MD         Health Maintenance   Topic Date Due    Flu Vaccine (1) 09/01/2021    COVID-19 Vaccine (3 - Pfizer booster) 09/01/2021    Medicare Yearly Exam  05/11/2022    Lipid Screen  10/12/2022    Colorectal Cancer Screening Combo  05/29/2023    DTaP/Tdap/Td series (2 - Td or Tdap) 11/24/2024    Hepatitis C Screening  Completed    Shingrix Vaccine Age 50>  Completed    Pneumococcal 65+ years  Completed       Review of Systems   Constitutional: Negative. Respiratory: Negative. Cardiovascular: Negative. Gastrointestinal: Negative.             Visit Vitals  /80 (BP 1 Location: Left arm, BP Patient Position: Sitting, BP Cuff Size: Adult)   Pulse 63   Temp 97.5 °F (36.4 °C) (Temporal)   Resp 16   Ht 6' (1.829 m)   Wt 331 lb 12.8 oz (150.5 kg)   SpO2 96%   BMI 45.00 kg/m²       Physical Exam  Constitutional:       Appearance: Normal appearance. Cardiovascular:      Rate and Rhythm: Normal rate and regular rhythm. Pulses: Normal pulses. Heart sounds: Normal heart sounds. Neurological:      Mental Status: He is alert. ASSESSMENT/PLAN    Diagnoses and all orders for this visit:    1. Elevated serum creatinine  -     METABOLIC PANEL, COMPREHENSIVE; Future    2. Pure hypercholesterolemia  -     atorvastatin (LIPITOR) 10 mg tablet; Take 1 Tablet by mouth daily. 3. Idiopathic gout, unspecified chronicity, unspecified site  -     allopurinoL (ZYLOPRIM) 300 mg tablet; Take 1 Tablet by mouth daily. -     indomethacin SR (INDOCIN SR) 75 mg SR capsule; Take 1 Capsule by mouth daily as needed (gout flare). 4. Essential hypertension  -     verapamil ER (CALAN-SR) 240 mg CR tablet; Take 1 Tablet by mouth two (2) times a day. -     valsartan (DIOVAN) 160 mg tablet; Take 1 Tablet by mouth daily. 5. Lower extremity edema  -     furosemide (LASIX) 20 mg tablet; TAKE 1/2 TAB BY MOUTH DAILY AS NEEDED (SWELLING). 6. Pigmented purpura ([facility]    7. Malignant melanoma, unspecified site [facility])      Follow-up and Dispositions    · Return in about 6 months (around 4/27/2022) for Medicare Wellness Visit- 30 minute appointment.

## 2021-11-12 LAB
ALBUMIN SERPL-MCNC: 4.5 G/DL (ref 3.8–4.8)
ALBUMIN/GLOB SERPL: 2.3 {RATIO} (ref 1.2–2.2)
ALP SERPL-CCNC: 95 IU/L (ref 44–121)
ALT SERPL-CCNC: 20 IU/L (ref 0–44)
AST SERPL-CCNC: 22 IU/L (ref 0–40)
BILIRUB SERPL-MCNC: 0.8 MG/DL (ref 0–1.2)
BUN SERPL-MCNC: 14 MG/DL (ref 8–27)
BUN/CREAT SERPL: 14 (ref 10–24)
CALCIUM SERPL-MCNC: 9.1 MG/DL (ref 8.6–10.2)
CHLORIDE SERPL-SCNC: 102 MMOL/L (ref 96–106)
CO2 SERPL-SCNC: 25 MMOL/L (ref 20–29)
CREAT SERPL-MCNC: 1.03 MG/DL (ref 0.76–1.27)
GLOBULIN SER CALC-MCNC: 2 G/DL (ref 1.5–4.5)
GLUCOSE SERPL-MCNC: 98 MG/DL (ref 65–99)
POTASSIUM SERPL-SCNC: 4.5 MMOL/L (ref 3.5–5.2)
PROT SERPL-MCNC: 6.5 G/DL (ref 6–8.5)
SODIUM SERPL-SCNC: 140 MMOL/L (ref 134–144)

## 2021-11-24 ENCOUNTER — OFFICE VISIT (OUTPATIENT)
Dept: INTERNAL MEDICINE CLINIC | Age: 71
End: 2021-11-24
Payer: MEDICARE

## 2021-11-24 VITALS
RESPIRATION RATE: 16 BRPM | TEMPERATURE: 97.5 F | HEIGHT: 72 IN | HEART RATE: 57 BPM | DIASTOLIC BLOOD PRESSURE: 7 MMHG | BODY MASS INDEX: 42.66 KG/M2 | SYSTOLIC BLOOD PRESSURE: 130 MMHG | WEIGHT: 315 LBS | OXYGEN SATURATION: 96 %

## 2021-11-24 DIAGNOSIS — I10 ESSENTIAL HYPERTENSION: ICD-10-CM

## 2021-11-24 DIAGNOSIS — R79.89 ELEVATED SERUM CREATININE: Primary | ICD-10-CM

## 2021-11-24 PROCEDURE — G8427 DOCREV CUR MEDS BY ELIG CLIN: HCPCS | Performed by: INTERNAL MEDICINE

## 2021-11-24 PROCEDURE — 99214 OFFICE O/P EST MOD 30 MIN: CPT | Performed by: INTERNAL MEDICINE

## 2021-11-24 PROCEDURE — G8752 SYS BP LESS 140: HCPCS | Performed by: INTERNAL MEDICINE

## 2021-11-24 PROCEDURE — G0463 HOSPITAL OUTPT CLINIC VISIT: HCPCS | Performed by: INTERNAL MEDICINE

## 2021-11-24 PROCEDURE — 3017F COLORECTAL CA SCREEN DOC REV: CPT | Performed by: INTERNAL MEDICINE

## 2021-11-24 PROCEDURE — G8754 DIAS BP LESS 90: HCPCS | Performed by: INTERNAL MEDICINE

## 2021-11-24 PROCEDURE — G8510 SCR DEP NEG, NO PLAN REQD: HCPCS | Performed by: INTERNAL MEDICINE

## 2021-11-24 PROCEDURE — G8536 NO DOC ELDER MAL SCRN: HCPCS | Performed by: INTERNAL MEDICINE

## 2021-11-24 PROCEDURE — 1101F PT FALLS ASSESS-DOCD LE1/YR: CPT | Performed by: INTERNAL MEDICINE

## 2021-11-24 PROCEDURE — G8417 CALC BMI ABV UP PARAM F/U: HCPCS | Performed by: INTERNAL MEDICINE

## 2021-11-24 NOTE — PROGRESS NOTES
Chief Complaint   Patient presents with    Abnormal Lab Results     discuss     Reviewed record in preparation for visit and have obtained necessary documentation. Identified pt with two pt identifiers(name and ). Health Maintenance Due   Topic    Flu Vaccine (1)         Chief Complaint   Patient presents with    Abnormal Lab Results     discuss        Wt Readings from Last 3 Encounters:   21 335 lb (152 kg)   10/27/21 331 lb 12.8 oz (150.5 kg)   21 330 lb (149.7 kg)     Temp Readings from Last 3 Encounters:   21 97.5 °F (36.4 °C) (Temporal)   10/27/21 97.5 °F (36.4 °C) (Temporal)   21 98.6 °F (37 °C) (Temporal)     BP Readings from Last 3 Encounters:   21 (!) 140/60   10/27/21 130/80   21 102/66     Pulse Readings from Last 3 Encounters:   21 (!) 57   10/27/21 63   21 60           Learning Assessment:  :     Learning Assessment 2019 2018 2016 2016 3/14/2016 2016   PRIMARY LEARNER Patient Patient Patient Patient Patient Patient   HIGHEST LEVEL OF EDUCATION - PRIMARY LEARNER  > 4 YEARS OF COLLEGE - - - - -   BARRIERS PRIMARY LEARNER NONE - - NONE - -   CO-LEARNER CAREGIVER - No - No - -   PRIMARY LANGUAGE ENGLISH ENGLISH ENGLISH ENGLISH ENGLISH ENGLISH   LEARNER PREFERENCE PRIMARY LISTENING LISTENING LISTENING DEMONSTRATION LISTENING -   LEARNING SPECIAL TOPICS - - no no no -   ANSWERED BY pt Patient patient patient patient -   RELATIONSHIP SELF SELF SELF SELF SELF -   ASSESSMENT COMMENT - - none none none -       Depression Screening:  :     3 most recent PHQ Screens 2021   Little interest or pleasure in doing things Not at all   Feeling down, depressed, irritable, or hopeless Not at all   Total Score PHQ 2 0       Fall Risk Assessment:  :     Fall Risk Assessment, last 12 mths 2021   Able to walk? Yes   Fall in past 12 months? 0   Do you feel unsteady?  0   Are you worried about falling 0       Abuse Screening:  : Abuse Screening Questionnaire 5/10/2021 7/19/2018 9/5/2017   Do you ever feel afraid of your partner? N N N   Are you in a relationship with someone who physically or mentally threatens you? N N N   Is it safe for you to go home? Y Y Y       Coordination of Care Questionnaire:  :     1) Have you been to an emergency room, urgent care clinic since your last visit? no   Hospitalized since your last visit? no             2) Have you seen or consulted any other health care providers outside of [address] [place] [address] since your last visit? no  (Include any pap smears or colon screenings in this section.)    3) Do you have an Advance Directive on file? yes    4) Are you interested in receiving information on Advance Directives? NO      Patient is accompanied by self I have received verbal consent from Dangelo Cuello to discuss any/all medical information while they are present in the room. Reviewed record  In preparation for visit and have obtained necessary documentation.

## 2021-11-24 NOTE — PROGRESS NOTES
Follow Up Visit    Nestor Moreno is a 73 y.o. male. he presents for Abnormal Lab Results (discuss)    The patient reports feeling well today. He is following up a single abnormal Creatinine that was noted on his 10/12/21 labs. At that time, this was measured at 1.5. He has since repeated that lab. The repeat test showed a normal Creatinine of 1.03. He has been eating preservative free food and monitoring his diet closely. We discussed the importance of continuing to do so and exercising regularly. Patient Active Problem List   Diagnosis Code    Hypertension I10    Hyperlipidemia E78.5    Gout M10.9    Multinodular goiter E04.2    Melanoma ([facility] [address] C43.9    Hx of adenomatous colonic polyps Z86.010    Obesity, morbid ([facility] [address] E66.01         Prior to Admission medications    Medication Sig Start Date End Date Taking? Authorizing Provider   atorvastatin (LIPITOR) 10 mg tablet Take 1 Tablet by mouth daily. 10/27/21  Yes Erick Jean MD   allopurinoL (ZYLOPRIM) 300 mg tablet Take 1 Tablet by mouth daily. 10/27/21  Yes Erick Jean MD   verapamil ER (CALAN-SR) 240 mg CR tablet Take 1 Tablet by mouth two (2) times a day. 10/27/21  Yes Erick Jean MD   valsartan (DIOVAN) 160 mg tablet Take 1 Tablet by mouth daily. 10/27/21  Yes Erick Jean MD   furosemide (LASIX) 20 mg tablet TAKE 1/2 TAB BY MOUTH DAILY AS NEEDED (SWELLING). 10/27/21  Yes Erick Jean MD   triamcinolone acetonide (KENALOG) 0.1 % topical cream Apply  to affected area two (2) times a day. use thin layer 3/30/20  Yes Antonette Magana NP   MYRBETRIQ 25 mg ER tablet Take 25 mg by mouth daily. 9/26/19  Yes Provider, Historical   tamsulosin (FLOMAX) 0.4 mg capsule Take 0.4 mg by mouth daily. 9/5/19  Yes Provider, Historical   indomethacin SR (INDOCIN SR) 75 mg SR capsule Take 1 Capsule by mouth daily as needed (gout flare).  10/27/21   Erick Jean MD   clotrimazole-betamethasone (LOTRISONE) topical cream  6/9/21 Provider, Historical         Health Maintenance   Topic Date Due    Flu Vaccine (1) 09/01/2021    Medicare Yearly Exam  05/11/2022    Lipid Screen  10/12/2022    Colorectal Cancer Screening Combo  05/29/2023    DTaP/Tdap/Td series (2 - Td or Tdap) 11/24/2024    Hepatitis C Screening  Completed    Shingrix Vaccine Age 50>  Completed    COVID-19 Vaccine  Completed    Pneumococcal 65+ years  Completed       Review of Systems   Constitutional: Negative. Respiratory: Negative. Cardiovascular: Negative. Gastrointestinal: Negative. Visit Vitals  BP (!) 130/7 (BP 1 Location: Left arm, BP Patient Position: Sitting, BP Cuff Size: Adult)   Pulse (!) 57   Temp 97.5 °F (36.4 °C) (Temporal)   Resp 16   Ht 6' (1.829 m)   Wt 335 lb (152 kg)   SpO2 96%   BMI 45.43 kg/m²       Physical Exam  Constitutional:       Appearance: He is obese. Cardiovascular:      Rate and Rhythm: Normal rate and regular rhythm. Pulses: Normal pulses. Heart sounds: Normal heart sounds. Pulmonary:      Effort: Pulmonary effort is normal.      Breath sounds: Normal breath sounds. Neurological:      Mental Status: He is alert. ASSESSMENT/PLAN    Diagnoses and all orders for this visit:    1. Elevated serum creatinine - Appears to have resolved at this time. No issues. 2. Essential hypertension - Pressures are stable. Continue present management. Follow-up and Dispositions    · Return in about 3 months (around 2/24/2022).

## 2022-03-19 PROBLEM — E66.01 OBESITY, MORBID (HCC): Status: ACTIVE | Noted: 2018-03-13

## 2022-06-01 ENCOUNTER — OFFICE VISIT (OUTPATIENT)
Dept: INTERNAL MEDICINE CLINIC | Age: 72
End: 2022-06-01
Payer: MEDICARE

## 2022-06-01 VITALS
OXYGEN SATURATION: 96 % | RESPIRATION RATE: 16 BRPM | DIASTOLIC BLOOD PRESSURE: 60 MMHG | TEMPERATURE: 97.8 F | HEART RATE: 69 BPM | BODY MASS INDEX: 42.66 KG/M2 | HEIGHT: 72 IN | WEIGHT: 315 LBS | SYSTOLIC BLOOD PRESSURE: 98 MMHG

## 2022-06-01 DIAGNOSIS — C43.9 MALIGNANT MELANOMA, UNSPECIFIED SITE (HCC): ICD-10-CM

## 2022-06-01 DIAGNOSIS — E66.01 OBESITY, MORBID (HCC): ICD-10-CM

## 2022-06-01 DIAGNOSIS — E78.00 PURE HYPERCHOLESTEROLEMIA: ICD-10-CM

## 2022-06-01 DIAGNOSIS — R73.09 ELEVATED HEMOGLOBIN A1C: ICD-10-CM

## 2022-06-01 DIAGNOSIS — I10 PRIMARY HYPERTENSION: ICD-10-CM

## 2022-06-01 DIAGNOSIS — R82.998 DARK URINE: ICD-10-CM

## 2022-06-01 DIAGNOSIS — Z00.00 MEDICARE ANNUAL WELLNESS VISIT, SUBSEQUENT: Primary | ICD-10-CM

## 2022-06-01 LAB
ALBUMIN SERPL-MCNC: 4.2 G/DL (ref 3.5–5)
ALBUMIN/GLOB SERPL: 1.5 {RATIO} (ref 1.1–2.2)
ALP SERPL-CCNC: 87 U/L (ref 45–117)
ALT SERPL-CCNC: 26 U/L (ref 12–78)
ANION GAP SERPL CALC-SCNC: 8 MMOL/L (ref 5–15)
APPEARANCE UR: ABNORMAL
AST SERPL-CCNC: 26 U/L (ref 15–37)
BACTERIA URNS QL MICRO: NEGATIVE /HPF
BASOPHILS # BLD: 0.1 K/UL (ref 0–0.1)
BASOPHILS NFR BLD: 1 % (ref 0–1)
BILIRUB SERPL-MCNC: 0.9 MG/DL (ref 0.2–1)
BILIRUB UR QL: NEGATIVE
BUN SERPL-MCNC: 24 MG/DL (ref 6–20)
BUN/CREAT SERPL: 21 (ref 12–20)
CALCIUM SERPL-MCNC: 9.4 MG/DL (ref 8.5–10.1)
CHLORIDE SERPL-SCNC: 104 MMOL/L (ref 97–108)
CHOLEST SERPL-MCNC: 125 MG/DL
CO2 SERPL-SCNC: 26 MMOL/L (ref 21–32)
COLOR UR: ABNORMAL
CREAT SERPL-MCNC: 1.15 MG/DL (ref 0.7–1.3)
DIFFERENTIAL METHOD BLD: NORMAL
EOSINOPHIL # BLD: 0.3 K/UL (ref 0–0.4)
EOSINOPHIL NFR BLD: 4 % (ref 0–7)
EPITH CASTS URNS QL MICRO: ABNORMAL /LPF
ERYTHROCYTE [DISTWIDTH] IN BLOOD BY AUTOMATED COUNT: 12.8 % (ref 11.5–14.5)
EST. AVERAGE GLUCOSE BLD GHB EST-MCNC: 114 MG/DL
GLOBULIN SER CALC-MCNC: 2.8 G/DL (ref 2–4)
GLUCOSE SERPL-MCNC: 92 MG/DL (ref 65–100)
GLUCOSE UR STRIP.AUTO-MCNC: NEGATIVE MG/DL
HBA1C MFR BLD: 5.6 % (ref 4–5.6)
HCT VFR BLD AUTO: 42 % (ref 36.6–50.3)
HDLC SERPL-MCNC: 50 MG/DL
HDLC SERPL: 2.5 {RATIO} (ref 0–5)
HGB BLD-MCNC: 13.8 G/DL (ref 12.1–17)
HGB UR QL STRIP: NEGATIVE
HYALINE CASTS URNS QL MICRO: ABNORMAL /LPF (ref 0–5)
IMM GRANULOCYTES # BLD AUTO: 0 K/UL (ref 0–0.04)
IMM GRANULOCYTES NFR BLD AUTO: 0 % (ref 0–0.5)
KETONES UR QL STRIP.AUTO: 15 MG/DL
LDLC SERPL CALC-MCNC: 61.4 MG/DL (ref 0–100)
LEUKOCYTE ESTERASE UR QL STRIP.AUTO: ABNORMAL
LYMPHOCYTES # BLD: 1.7 K/UL (ref 0.8–3.5)
LYMPHOCYTES NFR BLD: 22 % (ref 12–49)
MCH RBC QN AUTO: 32.4 PG (ref 26–34)
MCHC RBC AUTO-ENTMCNC: 32.9 G/DL (ref 30–36.5)
MCV RBC AUTO: 98.6 FL (ref 80–99)
MONOCYTES # BLD: 0.6 K/UL (ref 0–1)
MONOCYTES NFR BLD: 7 % (ref 5–13)
NEUTS SEG # BLD: 5.1 K/UL (ref 1.8–8)
NEUTS SEG NFR BLD: 66 % (ref 32–75)
NITRITE UR QL STRIP.AUTO: NEGATIVE
NRBC # BLD: 0 K/UL (ref 0–0.01)
NRBC BLD-RTO: 0 PER 100 WBC
PH UR STRIP: 5.5 [PH] (ref 5–8)
PLATELET # BLD AUTO: 220 K/UL (ref 150–400)
PMV BLD AUTO: 10.7 FL (ref 8.9–12.9)
POTASSIUM SERPL-SCNC: 4.2 MMOL/L (ref 3.5–5.1)
PROT SERPL-MCNC: 7 G/DL (ref 6.4–8.2)
PROT UR STRIP-MCNC: ABNORMAL MG/DL
RBC # BLD AUTO: 4.26 M/UL (ref 4.1–5.7)
RBC #/AREA URNS HPF: ABNORMAL /HPF (ref 0–5)
SODIUM SERPL-SCNC: 138 MMOL/L (ref 136–145)
SP GR UR REFRACTOMETRY: 1.03 (ref 1–1.03)
TRIGL SERPL-MCNC: 68 MG/DL (ref ?–150)
UR CULT HOLD, URHOLD: NORMAL
UROBILINOGEN UR QL STRIP.AUTO: 0.2 EU/DL (ref 0.2–1)
VLDLC SERPL CALC-MCNC: 13.6 MG/DL
WBC # BLD AUTO: 7.7 K/UL (ref 4.1–11.1)
WBC URNS QL MICRO: ABNORMAL /HPF (ref 0–4)

## 2022-06-01 PROCEDURE — 3017F COLORECTAL CA SCREEN DOC REV: CPT | Performed by: INTERNAL MEDICINE

## 2022-06-01 PROCEDURE — G8536 NO DOC ELDER MAL SCRN: HCPCS | Performed by: INTERNAL MEDICINE

## 2022-06-01 PROCEDURE — G8752 SYS BP LESS 140: HCPCS | Performed by: INTERNAL MEDICINE

## 2022-06-01 PROCEDURE — G8754 DIAS BP LESS 90: HCPCS | Performed by: INTERNAL MEDICINE

## 2022-06-01 PROCEDURE — G0439 PPPS, SUBSEQ VISIT: HCPCS | Performed by: INTERNAL MEDICINE

## 2022-06-01 PROCEDURE — G8427 DOCREV CUR MEDS BY ELIG CLIN: HCPCS | Performed by: INTERNAL MEDICINE

## 2022-06-01 PROCEDURE — G8417 CALC BMI ABV UP PARAM F/U: HCPCS | Performed by: INTERNAL MEDICINE

## 2022-06-01 PROCEDURE — G8510 SCR DEP NEG, NO PLAN REQD: HCPCS | Performed by: INTERNAL MEDICINE

## 2022-06-01 PROCEDURE — 1101F PT FALLS ASSESS-DOCD LE1/YR: CPT | Performed by: INTERNAL MEDICINE

## 2022-06-01 NOTE — PROGRESS NOTES
This is the Subsequent Medicare Annual Wellness Exam, performed 12 months or more after the Initial AWV or the last Subsequent AWV    I have reviewed the patient's medical history in detail and updated the computerized patient record. The patient has noted that his urine has become somewhat dark recently. He has been working outside in his yard quite a bit as the spring he has progressed. He denies pain in urination. He has no other complaint of dysuria. He has had recent follow-up with his dermatologist.  He has a history of melanoma and they continue to monitor this. Assessment/Plan   Education and counseling provided:  Are appropriate based on today's review and evaluation    1. Medicare annual wellness visit, subsequent  2. Malignant melanoma, unspecified site ([facility]  3. Primary hypertension  -     METABOLIC PANEL, COMPREHENSIVE; Future  -     CBC WITH AUTOMATED DIFF; Future  4. Pure hypercholesterolemia  -     LIPID PANEL; Future  5. Obesity, morbid ([facility]  6. Dark urine  -     URINALYSIS W/ RFLX MICROSCOPIC; Future  7. Elevated hemoglobin A1c  -     HEMOGLOBIN A1C WITH EAG; Future       Depression Risk Factor Screening     3 most recent PHQ Screens 6/1/2022   Little interest or pleasure in doing things Not at all   Feeling down, depressed, irritable, or hopeless Not at all   Total Score PHQ 2 0       Alcohol & Drug Abuse Risk Screen    Do you average more than 1 drink per night or more than 7 drinks a week: No    In the past three months have you have had more than 4 drinks containing alcohol on one occasion: No          Functional Ability and Level of Safety    Hearing: The patient wears hearing aids. Activities of Daily Living: The home contains: no safety equipment. Patient does total self care      Ambulation: with no difficulty     Fall Risk:  Fall Risk Assessment, last 12 mths 6/1/2022   Able to walk? Yes   Fall in past 12 months? 0   Do you feel unsteady?  0   Are you worried about falling 0      Abuse Screen:  Patient is not abused       Cognitive Screening    Has your family/caregiver stated any concerns about your memory: no         Health Maintenance Due   There are no preventive care reminders to display for this patient. Patient Care Team   Patient Care Team:  Brandan Villa MD as PCP - General (Internal Medicine Physician)  Brandan Villa MD as PCP - [facility]  Joao De Dios MD (Internal Medicine Physician)    History     Patient Active Problem List   Diagnosis Code    Hypertension I10    Hyperlipidemia E78.5    Gout M10.9    Multinodular goiter E04.2    Melanoma ([address] C43.9    Hx of adenomatous colonic polyps Z86.010    Obesity, morbid ([address] E66.01     Past Medical History:   Diagnosis Date    Cancer ([address]     melanoma     Gout     Gout     Hypercholesterolemia     Hypertension     Skin cancer     melanoma (left post shoulder) and BCC x 2 (scalp, right popliteal)    Sun-damaged skin     Thyroid disease       Past Surgical History:   Procedure Laterality Date    COLONOSCOPY N/A 5/29/2018    COLONOSCOPY performed by Rick Garcia MD at [facility] ENDOSCOPY    HX HEENT Left     partial thyroidectomy    HX ORTHOPAEDIC  1969    Left knee ACL     HX ORTHOPAEDIC  1979    dupytrens contractures    HX THYROIDECTOMY  05/2013    HX TONSILLECTOMY  1950    SKIN TISSUE PROCEDURE UNLISTED       Current Outpatient Medications   Medication Sig Dispense Refill    atorvastatin (LIPITOR) 10 mg tablet Take 1 Tablet by mouth daily. 90 Tablet 3    allopurinoL (ZYLOPRIM) 300 mg tablet Take 1 Tablet by mouth daily. 90 Tablet 3    verapamil ER (CALAN-SR) 240 mg CR tablet Take 1 Tablet by mouth two (2) times a day. 180 Tablet 3    valsartan (DIOVAN) 160 mg tablet Take 1 Tablet by mouth daily. 90 Tablet 3    MYRBETRIQ 25 mg ER tablet Take 25 mg by mouth daily.  tamsulosin (FLOMAX) 0.4 mg capsule Take 0.4 mg by mouth daily.   3    furosemide (LASIX) 20 mg tablet TAKE 1/2 TAB BY MOUTH DAILY AS NEEDED (SWELLING). 45 Tablet 3    indomethacin SR (INDOCIN SR) 75 mg SR capsule Take 1 Capsule by mouth daily as needed (gout flare). 30 Capsule 3    clotrimazole-betamethasone (LOTRISONE) topical cream  (Patient not taking: Reported on 10/27/2021)      triamcinolone acetonide (KENALOG) 0.1 % topical cream Apply  to affected area two (2) times a day. use thin layer 60 g 2     No Known Allergies    Family History   Problem Relation Age of Onset    Diabetes Mother     Heart Disease Mother         heart valve replaced    Hypertension Father     Colon Polyps Father     Cancer Brother         Stomach      Social History     Tobacco Use    Smoking status: Never Smoker    Smokeless tobacco: Never Used   Substance Use Topics    Alcohol use:  Yes     Alcohol/week: 1.0 standard drink     Types: 1 Cans of beer per week         Gulshan Giron MD

## 2022-06-01 NOTE — PROGRESS NOTES
Chief Complaint   Patient presents with   [facility] Wellness Visit     Reviewed record in preparation for visit and have obtained necessary documentation. Identified pt with two pt identifiers(name and ). Health Maintenance Due   Topic    Medicare Yearly Exam          Chief Complaint   Patient presents with   Carmela Lloyd Annual Wellness Visit        Wt Readings from Last 3 Encounters:   22 328 lb (148.8 kg)   21 335 lb (152 kg)   10/27/21 331 lb 12.8 oz (150.5 kg)     Temp Readings from Last 3 Encounters:   22 97.8 °F (36.6 °C) (Temporal)   21 97.5 °F (36.4 °C) (Temporal)   10/27/21 97.5 °F (36.4 °C) (Temporal)     BP Readings from Last 3 Encounters:   22 98/60   21 (!) 130/7   10/27/21 130/80     Pulse Readings from Last 3 Encounters:   22 69   21 (!) 57   10/27/21 63           Learning Assessment:  :     Learning Assessment 2019 2018 2016 2016 3/14/2016 2016   PRIMARY LEARNER Patient Patient Patient Patient Patient Patient   HIGHEST LEVEL OF EDUCATION - PRIMARY LEARNER  > 4 YEARS OF COLLEGE - - - - -   BARRIERS PRIMARY LEARNER NONE - - NONE - -   CO-LEARNER CAREGIVER - No - No - -   PRIMARY LANGUAGE ENGLISH ENGLISH ENGLISH ENGLISH ENGLISH ENGLISH   LEARNER PREFERENCE PRIMARY LISTENING LISTENING LISTENING DEMONSTRATION LISTENING -   LEARNING SPECIAL TOPICS - - no no no -   ANSWERED BY pt Patient patient patient patient -   RELATIONSHIP SELF SELF SELF SELF SELF -   ASSESSMENT COMMENT - - none none none -       Depression Screening:  :     3 most recent PHQ Screens 2022   Little interest or pleasure in doing things Not at all   Feeling down, depressed, irritable, or hopeless Not at all   Total Score PHQ 2 0       Fall Risk Assessment:  :     Fall Risk Assessment, last 12 mths 2022   Able to walk? Yes   Fall in past 12 months? 0   Do you feel unsteady?  0   Are you worried about falling 0       Abuse Screening:  :     Abuse Screening Questionnaire 6/1/2022 5/10/2021 7/19/2018 9/5/2017   Do you ever feel afraid of your partner? N N N N   Are you in a relationship with someone who physically or mentally threatens you? N N N N   Is it safe for you to go home? Y Y Y Y       Coordination of Care Questionnaire:  :     1) Have you been to an emergency room, urgent care clinic since your last visit? no   Hospitalized since your last visit? no             2) Have you seen or consulted any other health care providers outside of [address] [place] [address] since your last visit? no  (Include any pap smears or colon screenings in this section.)    3) Do you have an Advance Directive on file? no    4) Are you interested in receiving information on Advance Directives? NO      Patient is accompanied by self I have received verbal consent from Kathaleen Boas to discuss any/all medical information while they are present in the room. Reviewed record  In preparation for visit and have obtained necessary documentation.

## 2022-06-01 NOTE — PATIENT INSTRUCTIONS
Medicare Wellness Visit, Male    The best way to live healthy is to have a lifestyle where you eat a well-balanced diet, exercise regularly, limit alcohol use, and quit all forms of tobacco/nicotine, if applicable. Regular preventive services are another way to keep healthy. Preventive services (vaccines, screening tests, monitoring & exams) can help personalize your care plan, which helps you manage your own care. Screening tests can find health problems at the earliest stages, when they are easiest to treat. [facility] follows the current, evidence-based guidelines published by the [facility] Grzegor[facility] ([facility]) when recommending preventive services for our patients. Because we follow these guidelines, sometimes recommendations change over time as research supports it. (For example, a prostate screening blood test is no longer routinely recommended for men with no symptoms). Of course, you and your doctor may decide to screen more often for some diseases, based on your risk and co-morbidities (chronic disease you are already diagnosed with). Preventive services for you include:  - Medicare offers their members a free annual wellness visit, which is time for you and your primary care provider to discuss and plan for your preventive service needs. Take advantage of this benefit every year!  -All adults over age 72 should receive the recommended pneumonia vaccines. Current USPSTF guidelines recommend a series of two vaccines for the best pneumonia protection.   -All adults should have a flu vaccine yearly and tetanus vaccine every 10 years.  -All adults age 48 and older should receive the shingles vaccines (series of two vaccines).        -All adults age 38-68 who are overweight should have a diabetes screening test once every three years.   -Other screening tests & preventive services for persons with diabetes include: an eye exam to screen for diabetic retinopathy, a kidney function test, a foot exam, and stricter control over your cholesterol.   -Cardiovascular screening for adults with routine risk involves an electrocardiogram (ECG) at intervals determined by the provider.   -Colorectal cancer screening should be done for adults age 54-65 with no increased risk factors for colorectal cancer. There are a number of acceptable methods of screening for this type of cancer. Each test has its own benefits and drawbacks. Discuss with your provider what is most appropriate for you during your annual wellness visit. The different tests include: colonoscopy (considered the best screening method), a fecal occult blood test, a fecal DNA test, and sigmoidoscopy.  -All adults born between [facility] should be screened once for Hepatitis C.  -An Abdominal Aortic Aneurysm (AAA) Screening is recommended for men age 73-68 who has ever smoked in their lifetime. Here is a list of your current Health Maintenance items (your personalized list of preventive services) with a due date: There are no preventive care reminders to display for this patient.

## 2022-09-09 DIAGNOSIS — M10.00 IDIOPATHIC GOUT, UNSPECIFIED CHRONICITY, UNSPECIFIED SITE: Chronic | ICD-10-CM

## 2022-09-09 DIAGNOSIS — E78.00 PURE HYPERCHOLESTEROLEMIA: Chronic | ICD-10-CM

## 2022-09-09 DIAGNOSIS — I10 ESSENTIAL HYPERTENSION: Chronic | ICD-10-CM

## 2022-09-09 RX ORDER — ATORVASTATIN CALCIUM 10 MG/1
10 TABLET, FILM COATED ORAL DAILY
Qty: 90 TABLET | Refills: 1 | Status: SHIPPED | COMMUNITY
Start: 2022-09-09

## 2022-09-09 RX ORDER — VALSARTAN 160 MG/1
160 TABLET ORAL DAILY
Qty: 90 TABLET | Refills: 1 | Status: SHIPPED | COMMUNITY
Start: 2022-09-09

## 2022-09-09 RX ORDER — ALLOPURINOL 300 MG/1
300 TABLET ORAL DAILY
Qty: 90 TABLET | Refills: 1 | Status: SHIPPED | COMMUNITY
Start: 2022-09-09

## 2022-09-09 RX ORDER — VERAPAMIL HYDROCHLORIDE 240 MG/1
240 TABLET, FILM COATED, EXTENDED RELEASE ORAL 2 TIMES DAILY
Qty: 180 TABLET | Refills: 1 | Status: SHIPPED | COMMUNITY
Start: 2022-09-09

## 2022-09-19 ENCOUNTER — OFFICE VISIT (OUTPATIENT)
Dept: INTERNAL MEDICINE CLINIC | Age: 72
End: 2022-09-19
Payer: MEDICARE

## 2022-09-19 VITALS
BODY MASS INDEX: 42.66 KG/M2 | HEART RATE: 55 BPM | OXYGEN SATURATION: 95 % | SYSTOLIC BLOOD PRESSURE: 110 MMHG | RESPIRATION RATE: 16 BRPM | HEIGHT: 72 IN | WEIGHT: 315 LBS | DIASTOLIC BLOOD PRESSURE: 64 MMHG | TEMPERATURE: 97.6 F

## 2022-09-19 DIAGNOSIS — I95.2 HYPOTENSION DUE TO DRUGS: ICD-10-CM

## 2022-09-19 DIAGNOSIS — R42 DIZZY: Primary | ICD-10-CM

## 2022-09-19 PROCEDURE — G8427 DOCREV CUR MEDS BY ELIG CLIN: HCPCS | Performed by: INTERNAL MEDICINE

## 2022-09-19 PROCEDURE — G8536 NO DOC ELDER MAL SCRN: HCPCS | Performed by: INTERNAL MEDICINE

## 2022-09-19 PROCEDURE — 3017F COLORECTAL CA SCREEN DOC REV: CPT | Performed by: INTERNAL MEDICINE

## 2022-09-19 PROCEDURE — G8510 SCR DEP NEG, NO PLAN REQD: HCPCS | Performed by: INTERNAL MEDICINE

## 2022-09-19 PROCEDURE — G8417 CALC BMI ABV UP PARAM F/U: HCPCS | Performed by: INTERNAL MEDICINE

## 2022-09-19 PROCEDURE — G8754 DIAS BP LESS 90: HCPCS | Performed by: INTERNAL MEDICINE

## 2022-09-19 PROCEDURE — G0463 HOSPITAL OUTPT CLINIC VISIT: HCPCS | Performed by: INTERNAL MEDICINE

## 2022-09-19 PROCEDURE — 1101F PT FALLS ASSESS-DOCD LE1/YR: CPT | Performed by: INTERNAL MEDICINE

## 2022-09-19 PROCEDURE — G8752 SYS BP LESS 140: HCPCS | Performed by: INTERNAL MEDICINE

## 2022-09-19 PROCEDURE — 99214 OFFICE O/P EST MOD 30 MIN: CPT | Performed by: INTERNAL MEDICINE

## 2022-09-19 NOTE — PROGRESS NOTES
Chief Complaint   Patient presents with    Hypotension    Dizziness     Reviewed record in preparation for visit and have obtained necessary documentation. Identified pt with two pt identifiers(name and ). Health Maintenance Due   Topic    COVID-19 Vaccine (4 - Booster for Pfizer series)    Flu Vaccine (1)         Chief Complaint   Patient presents with    Hypotension    Dizziness        Wt Readings from Last 3 Encounters:   22 330 lb (149.7 kg)   22 328 lb (148.8 kg)   21 335 lb (152 kg)     Temp Readings from Last 3 Encounters:   22 97.6 °F (36.4 °C) (Temporal)   22 97.8 °F (36.6 °C) (Temporal)   21 97.5 °F (36.4 °C) (Temporal)     BP Readings from Last 3 Encounters:   22 110/64   22 98/60   21 (!) 130/7     Pulse Readings from Last 3 Encounters:   22 (!) 55   22 69   21 (!) 57           Learning Assessment:  :     Learning Assessment 2019 2018 2016 2016 3/14/2016 2016   PRIMARY LEARNER Patient Patient Patient Patient Patient Patient   HIGHEST LEVEL OF EDUCATION - PRIMARY LEARNER  > 4 YEARS OF COLLEGE - - - - -   BARRIERS PRIMARY LEARNER NONE - - NONE - -   CO-LEARNER CAREGIVER - No - No - -   PRIMARY LANGUAGE ENGLISH ENGLISH ENGLISH ENGLISH ENGLISH ENGLISH   LEARNER PREFERENCE PRIMARY LISTENING LISTENING LISTENING DEMONSTRATION LISTENING -   LEARNING SPECIAL TOPICS - - no no no -   ANSWERED BY pt Patient patient patient patient -   RELATIONSHIP SELF SELF SELF SELF SELF -   ASSESSMENT COMMENT - - none none none -       Depression Screening:  :     3 most recent PHQ Screens 2022   Little interest or pleasure in doing things Not at all   Feeling down, depressed, irritable, or hopeless Not at all   Total Score PHQ 2 0       Fall Risk Assessment:  :     Fall Risk Assessment, last 12 mths 2022   Able to walk? Yes   Fall in past 12 months? 0   Do you feel unsteady?  0   Are you worried about falling 0 Abuse Screening:  :     Abuse Screening Questionnaire 9/19/2022 6/1/2022 5/10/2021 7/19/2018 9/5/2017   Do you ever feel afraid of your partner? N N N N N   Are you in a relationship with someone who physically or mentally threatens you? N N N N N   Is it safe for you to go home? Y Y Y Y Y       Coordination of Care Questionnaire:  :     1) Have you been to an emergency room, urgent care clinic since your last visit? no   Hospitalized since your last visit? no             2) Have you seen or consulted any other health care providers outside of [address] [place] [address] since your last visit? no  (Include any pap smears or colon screenings in this section.)    3) Do you have an Advance Directive on file? no    4) Are you interested in receiving information on Advance Directives? NO      Patient is accompanied by self I have received verbal consent from Juan Lynn to discuss any/all medical information while they are present in the room. Reviewed record  In preparation for visit and have obtained necessary documentation.

## 2022-09-19 NOTE — PROGRESS NOTES
Acute Care Note    Delon Felipe is 73 y.o. male. he presents for evaluation of Hypotension and Dizziness      Last week he was very dizzy while working outside. It was very warm at the time and he notes that once he came inside, he was able to cool down and his dizziness abated. He had no further issues after this. He has been working on healthier living. He does not eat salty foods. He has noted that his pressures have been lower recently. We discussed the possibility of adjusting his blood pressure medications. Prior to Admission medications    Medication Sig Start Date End Date Taking? Authorizing Provider   atorvastatin (LIPITOR) 10 mg tablet Take 1 Tablet by mouth daily. 9/9/22  Yes Maryann Macdonald MD   allopurinoL (ZYLOPRIM) 300 mg tablet Take 1 Tablet by mouth daily. 9/9/22  Yes Maryann Macdonald MD   valsartan (DIOVAN) 160 mg tablet Take 1 Tablet by mouth daily. 9/9/22  Yes Maryann Macdonald MD   verapamil ER (CALAN-SR) 240 mg CR tablet Take 1 Tablet by mouth two (2) times a day. 9/9/22  Yes Maryann Macdonald MD   indomethacin SR (INDOCIN SR) 75 mg SR capsule Take 1 Capsule by mouth daily as needed (gout flare). 10/27/21  Yes Maryann Macdonald MD   MYRBETRIQ 25 mg ER tablet Take 25 mg by mouth daily. 9/26/19  Yes Provider, Historical   tamsulosin (FLOMAX) 0.4 mg capsule Take 0.4 mg by mouth daily. 9/5/19  Yes Provider, Historical   furosemide (LASIX) 20 mg tablet TAKE 1/2 TAB BY MOUTH DAILY AS NEEDED (SWELLING). 10/27/21   Maryann Macdonald MD   clotrimazole-betamethasone Wilhelmena [place]) topical cream  6/9/21 9/19/22  Provider, Historical   triamcinolone acetonide (KENALOG) 0.1 % topical cream Apply  to affected area two (2) times a day.  use thin layer 3/30/20   Kemal Motta NP         Patient Active Problem List   Diagnosis Code    Hypertension I10    Hyperlipidemia E78.5    Gout M10.9    Multinodular goiter E04.2    Melanoma ([address] C43.9    Hx of adenomatous colonic polyps Z86.010 Obesity, morbid ([facility] E66.01         Review of Systems   Constitutional: Negative. Respiratory: Negative. Cardiovascular: Negative. Gastrointestinal: Negative. Visit Vitals  /64 (BP 1 Location: Left arm, BP Patient Position: Sitting, BP Cuff Size: Adult)   Pulse (!) 55   Temp 97.6 °F (36.4 °C) (Temporal)   Resp 16   Ht 6' (1.829 m)   Wt 330 lb (149.7 kg)   SpO2 95%   BMI 44.76 kg/m²       Physical Exam  Constitutional:       Appearance: Normal appearance. Cardiovascular:      Rate and Rhythm: Normal rate and regular rhythm. Pulses: Normal pulses. Heart sounds: Normal heart sounds. Neurological:      Mental Status: He is alert. ASSESSMENT/PLAN  Diagnoses and all orders for this visit:    1. Dizzy - I suspect this is somewhat related to hypovolemia. He will increase fluid intake    2. Hypotension due to drugs- We will decrease his Calan to one tablet daily. Follow up if ongoing symptoms. Advised the patient to call back or return to office if symptoms worsen/change/persist.   Discussed expected course/resolution/complications of diagnosis in detail with patient. Medication risks/benefits/costs/interactions/alternatives discussed with patient. The patient was given an after visit summary which includes diagnoses, current medications, & vitals. They expressed understanding with the diagnosis and plan.

## 2022-09-22 PROBLEM — D69.2 PIGMENTED PURPURA (HCC): Status: ACTIVE | Noted: 2022-09-22

## 2022-09-22 PROBLEM — R42 DIZZY: Status: ACTIVE | Noted: 2022-09-22

## 2022-11-19 ENCOUNTER — TELEPHONE (OUTPATIENT)
Dept: INTERNAL MEDICINE CLINIC | Age: 72
End: 2022-11-19

## 2022-11-20 NOTE — TELEPHONE ENCOUNTER
ON CALL NOTE:   Pt reports fever of 102 today, urinary urgency and burning. He had a sonogram and injection in his arm by his urologist 2 days ago but reports this was his routine visit. Has hx of prostatitis in the past. No other symptoms. Medications & allergies reviewed. Recommend he contact urology on call to discuss symptoms and if not able to reach his urologist he is referred to urgent care for evaluation.

## 2022-12-20 ENCOUNTER — OFFICE VISIT (OUTPATIENT)
Dept: INTERNAL MEDICINE CLINIC | Age: 72
End: 2022-12-20
Payer: MEDICARE

## 2022-12-20 VITALS
OXYGEN SATURATION: 95 % | HEIGHT: 72 IN | BODY MASS INDEX: 42.66 KG/M2 | HEART RATE: 70 BPM | DIASTOLIC BLOOD PRESSURE: 80 MMHG | WEIGHT: 315 LBS | SYSTOLIC BLOOD PRESSURE: 130 MMHG | TEMPERATURE: 97.5 F | RESPIRATION RATE: 16 BRPM

## 2022-12-20 DIAGNOSIS — E78.00 PURE HYPERCHOLESTEROLEMIA: Chronic | ICD-10-CM

## 2022-12-20 DIAGNOSIS — M54.50 CHRONIC MIDLINE LOW BACK PAIN WITHOUT SCIATICA: ICD-10-CM

## 2022-12-20 DIAGNOSIS — M54.50 ACUTE LEFT-SIDED LOW BACK PAIN WITHOUT SCIATICA: Primary | ICD-10-CM

## 2022-12-20 DIAGNOSIS — G89.29 CHRONIC MIDLINE LOW BACK PAIN WITHOUT SCIATICA: ICD-10-CM

## 2022-12-20 DIAGNOSIS — I10 ESSENTIAL HYPERTENSION: Chronic | ICD-10-CM

## 2022-12-20 DIAGNOSIS — M10.00 IDIOPATHIC GOUT, UNSPECIFIED CHRONICITY, UNSPECIFIED SITE: Chronic | ICD-10-CM

## 2022-12-20 PROCEDURE — G8510 SCR DEP NEG, NO PLAN REQD: HCPCS | Performed by: INTERNAL MEDICINE

## 2022-12-20 PROCEDURE — 1101F PT FALLS ASSESS-DOCD LE1/YR: CPT | Performed by: INTERNAL MEDICINE

## 2022-12-20 PROCEDURE — 3017F COLORECTAL CA SCREEN DOC REV: CPT | Performed by: INTERNAL MEDICINE

## 2022-12-20 PROCEDURE — G8417 CALC BMI ABV UP PARAM F/U: HCPCS | Performed by: INTERNAL MEDICINE

## 2022-12-20 PROCEDURE — 99214 OFFICE O/P EST MOD 30 MIN: CPT | Performed by: INTERNAL MEDICINE

## 2022-12-20 PROCEDURE — G8427 DOCREV CUR MEDS BY ELIG CLIN: HCPCS | Performed by: INTERNAL MEDICINE

## 2022-12-20 PROCEDURE — G0463 HOSPITAL OUTPT CLINIC VISIT: HCPCS | Performed by: INTERNAL MEDICINE

## 2022-12-20 PROCEDURE — G8536 NO DOC ELDER MAL SCRN: HCPCS | Performed by: INTERNAL MEDICINE

## 2022-12-20 RX ORDER — ALLOPURINOL 300 MG/1
300 TABLET ORAL DAILY
Qty: 90 TABLET | Refills: 1 | Status: SHIPPED | OUTPATIENT
Start: 2022-12-20

## 2022-12-20 RX ORDER — CYCLOBENZAPRINE HCL 5 MG
5 TABLET ORAL
Qty: 30 TABLET | Refills: 0 | Status: SHIPPED | OUTPATIENT
Start: 2022-12-20

## 2022-12-20 RX ORDER — ATORVASTATIN CALCIUM 10 MG/1
10 TABLET, FILM COATED ORAL DAILY
Qty: 90 TABLET | Refills: 1 | Status: SHIPPED | OUTPATIENT
Start: 2022-12-20

## 2022-12-20 RX ORDER — VERAPAMIL HYDROCHLORIDE 240 MG/1
240 TABLET, FILM COATED, EXTENDED RELEASE ORAL 2 TIMES DAILY
Qty: 180 TABLET | Refills: 1 | Status: SHIPPED | OUTPATIENT
Start: 2022-12-20

## 2022-12-20 RX ORDER — VALSARTAN 160 MG/1
160 TABLET ORAL DAILY
Qty: 90 TABLET | Refills: 1 | Status: SHIPPED | OUTPATIENT
Start: 2022-12-20

## 2022-12-20 NOTE — PROGRESS NOTES
Follow Up Visit    Mao Huber is a 81 y.o. male. he presents for follow up. Cardiovascular Review  The patient has hypertension. He reports taking medications as instructed, no medication side effects noted. Diet and Lifestyle: generally follows a low fat low cholesterol diet, generally follows a low sodium diet. Lab review: labs reviewed and discussed with patient. He has had some ongoing lower back pain. This has been present for the past 3 months. It is mild. No numbness or tingling in the legs. Patient Active Problem List   Diagnosis Code    Hypertension I10    Hyperlipidemia E78.5    Gout M10.9    Multinodular goiter E04.2    Melanoma ([facility] C43.9    Hx of adenomatous colonic polyps Z86.010    Obesity, morbid ([facility] E66.01    Dizzy R42    Pigmented purpura ([facility] D69.2         Prior to Admission medications    Medication Sig Start Date End Date Taking? Authorizing Provider   atorvastatin (LIPITOR) 10 mg tablet Take 1 Tablet by mouth daily. 12/20/22  Yes Armaan Fernandes MD   allopurinoL (ZYLOPRIM) 300 mg tablet Take 1 Tablet by mouth daily. 12/20/22  Yes Armaan Fernandes MD   valsartan (DIOVAN) 160 mg tablet Take 1 Tablet by mouth daily. 12/20/22  Yes Armaan Fernandes MD   verapamil ER (CALAN-SR) 240 mg CR tablet Take 1 Tablet by mouth two (2) times a day. 12/20/22  Yes Armaan Fernandes MD   MYRBETRIQ 25 mg ER tablet Take 25 mg by mouth daily. 9/26/19  Yes Provider, Historical   tamsulosin (FLOMAX) 0.4 mg capsule Take 0.4 mg by mouth daily. 9/5/19  Yes Provider, Historical   atorvastatin (LIPITOR) 10 mg tablet Take 1 Tablet by mouth daily. 9/9/22 12/20/22  Armaan Fernandes MD   allopurinoL (ZYLOPRIM) 300 mg tablet Take 1 Tablet by mouth daily. 9/9/22 12/20/22  Armaan Fernandes MD   valsartan (DIOVAN) 160 mg tablet Take 1 Tablet by mouth daily. 9/9/22 12/20/22  Armaan Fernandes MD   verapamil ER (CALAN-SR) 240 mg CR tablet Take 1 Tablet by mouth two (2) times a day.  9/9/22 12/20/22 Shayan Edmonds MD   furosemide (LASIX) 20 mg tablet TAKE 1/2 TAB BY MOUTH DAILY AS NEEDED (SWELLING). 10/27/21   Shayan Edmonds MD   indomethacin SR (INDOCIN SR) 75 mg SR capsule Take 1 Capsule by mouth daily as needed (gout flare). 10/27/21   Shayan Edmonds MD   triamcinolone acetonide (KENALOG) 0.1 % topical cream Apply  to affected area two (2) times a day. use thin layer 3/30/20   Slim Gambino NP         Health Maintenance   Topic Date Due    COVID-19 Vaccine (4 - Booster for Pfizer series) 10/26/2021    Flu Vaccine (1) 08/01/2022    Colorectal Cancer Screening Combo  05/29/2023    Lipid Screen  06/01/2023    Medicare Yearly Exam  06/02/2023    Depression Screen  09/19/2023    DTaP/Tdap/Td series (2 - Td or Tdap) 11/24/2024    Hepatitis C Screening  Completed    Shingrix Vaccine Age 50>  Completed    Pneumococcal 65+ years  Completed       Review of Systems   Musculoskeletal:  Positive for back pain. Visit Vitals  /80   Pulse 70   Temp 97.5 °F (36.4 °C) (Temporal)   Resp 16   Ht 6' (1.829 m)   Wt 326 lb 12.8 oz (148.2 kg)   SpO2 95%   BMI 44.32 kg/m²       Physical Exam  Constitutional:       Appearance: He is well-developed. He is obese. Cardiovascular:      Rate and Rhythm: Normal rate and regular rhythm. Pulmonary:      Effort: Pulmonary effort is normal.      Breath sounds: Normal breath sounds. ASSESSMENT/PLAN    Diagnoses and all orders for this visit:    1. Acute left-sided low back pain without sciatica  -     cyclobenzaprine (FLEXERIL) 5 mg tablet; Take 1 Tablet by mouth three (3) times daily as needed for Muscle Spasm(s). 2. Pure hypercholesterolemia  -     atorvastatin (LIPITOR) 10 mg tablet; Take 1 Tablet by mouth daily.  -     METABOLIC PANEL, COMPREHENSIVE; Future  -     LIPID PANEL; Future    3. Idiopathic gout, unspecified chronicity, unspecified site  -     allopurinoL (ZYLOPRIM) 300 mg tablet; Take 1 Tablet by mouth daily.     4. Essential hypertension  -     valsartan (DIOVAN) 160 mg tablet; Take 1 Tablet by mouth daily. -     verapamil ER (CALAN-SR) 240 mg CR tablet; Take 1 Tablet by mouth two (2) times a day. 6. Chronic midline low back pain without sciatica - Likely due to lumbar strain as a result of obesity.   Encouraged weight loss

## 2023-05-03 ENCOUNTER — OFFICE VISIT (OUTPATIENT)
Dept: INTERNAL MEDICINE CLINIC | Age: 73
End: 2023-05-03
Payer: MEDICARE

## 2023-05-03 VITALS
TEMPERATURE: 97.5 F | SYSTOLIC BLOOD PRESSURE: 115 MMHG | BODY MASS INDEX: 42.66 KG/M2 | HEIGHT: 72 IN | OXYGEN SATURATION: 96 % | RESPIRATION RATE: 16 BRPM | WEIGHT: 315 LBS | HEART RATE: 58 BPM | DIASTOLIC BLOOD PRESSURE: 64 MMHG

## 2023-05-03 DIAGNOSIS — C43.9 MALIGNANT MELANOMA, UNSPECIFIED SITE (HCC): ICD-10-CM

## 2023-05-03 DIAGNOSIS — E66.01 MORBID OBESITY WITH BMI OF 45.0-49.9, ADULT (HCC): ICD-10-CM

## 2023-05-03 DIAGNOSIS — D69.2 PIGMENTED PURPURA (HCC): ICD-10-CM

## 2023-05-03 DIAGNOSIS — R59.9 REACTIVE LYMPHADENOPATHY: Primary | ICD-10-CM

## 2023-05-03 PROCEDURE — G0463 HOSPITAL OUTPT CLINIC VISIT: HCPCS | Performed by: INTERNAL MEDICINE

## 2023-06-20 ENCOUNTER — OFFICE VISIT (OUTPATIENT)
Age: 73
End: 2023-06-20
Payer: MEDICARE

## 2023-06-20 VITALS
BODY MASS INDEX: 42.66 KG/M2 | TEMPERATURE: 98.3 F | HEIGHT: 72 IN | DIASTOLIC BLOOD PRESSURE: 63 MMHG | WEIGHT: 315 LBS | SYSTOLIC BLOOD PRESSURE: 112 MMHG | HEART RATE: 58 BPM | OXYGEN SATURATION: 96 % | RESPIRATION RATE: 16 BRPM

## 2023-06-20 DIAGNOSIS — Z87.898 HISTORY OF PREDIABETES: ICD-10-CM

## 2023-06-20 DIAGNOSIS — D69.2 OTHER NONTHROMBOCYTOPENIC PURPURA (HCC): ICD-10-CM

## 2023-06-20 DIAGNOSIS — E66.01 OBESITY, MORBID (HCC): ICD-10-CM

## 2023-06-20 DIAGNOSIS — E78.2 MIXED HYPERLIPIDEMIA: Primary | ICD-10-CM

## 2023-06-20 DIAGNOSIS — I10 PRIMARY HYPERTENSION: ICD-10-CM

## 2023-06-20 DIAGNOSIS — Z13.1 DIABETES MELLITUS SCREENING: ICD-10-CM

## 2023-06-20 DIAGNOSIS — R73.09 ELEVATED GLUCOSE: ICD-10-CM

## 2023-06-20 DIAGNOSIS — C43.9 MALIGNANT MELANOMA OF SKIN, UNSPECIFIED (HCC): ICD-10-CM

## 2023-06-20 PROCEDURE — G8427 DOCREV CUR MEDS BY ELIG CLIN: HCPCS | Performed by: INTERNAL MEDICINE

## 2023-06-20 PROCEDURE — 99214 OFFICE O/P EST MOD 30 MIN: CPT | Performed by: INTERNAL MEDICINE

## 2023-06-20 PROCEDURE — 1123F ACP DISCUSS/DSCN MKR DOCD: CPT | Performed by: INTERNAL MEDICINE

## 2023-06-20 PROCEDURE — 3078F DIAST BP <80 MM HG: CPT | Performed by: INTERNAL MEDICINE

## 2023-06-20 PROCEDURE — 1036F TOBACCO NON-USER: CPT | Performed by: INTERNAL MEDICINE

## 2023-06-20 PROCEDURE — 3017F COLORECTAL CA SCREEN DOC REV: CPT | Performed by: INTERNAL MEDICINE

## 2023-06-20 PROCEDURE — G8417 CALC BMI ABV UP PARAM F/U: HCPCS | Performed by: INTERNAL MEDICINE

## 2023-06-20 PROCEDURE — 3074F SYST BP LT 130 MM HG: CPT | Performed by: INTERNAL MEDICINE

## 2023-06-20 RX ORDER — VALSARTAN 160 MG/1
160 TABLET ORAL DAILY
Qty: 90 TABLET | Refills: 1 | Status: SHIPPED | OUTPATIENT
Start: 2023-06-20

## 2023-06-20 SDOH — ECONOMIC STABILITY: FOOD INSECURITY: WITHIN THE PAST 12 MONTHS, YOU WORRIED THAT YOUR FOOD WOULD RUN OUT BEFORE YOU GOT MONEY TO BUY MORE.: NEVER TRUE

## 2023-06-20 SDOH — ECONOMIC STABILITY: FOOD INSECURITY: WITHIN THE PAST 12 MONTHS, THE FOOD YOU BOUGHT JUST DIDN'T LAST AND YOU DIDN'T HAVE MONEY TO GET MORE.: NEVER TRUE

## 2023-06-20 SDOH — ECONOMIC STABILITY: HOUSING INSECURITY
IN THE LAST 12 MONTHS, WAS THERE A TIME WHEN YOU DID NOT HAVE A STEADY PLACE TO SLEEP OR SLEPT IN A SHELTER (INCLUDING NOW)?: NO

## 2023-06-20 SDOH — ECONOMIC STABILITY: INCOME INSECURITY: HOW HARD IS IT FOR YOU TO PAY FOR THE VERY BASICS LIKE FOOD, HOUSING, MEDICAL CARE, AND HEATING?: NOT HARD AT ALL

## 2023-06-20 ASSESSMENT — ENCOUNTER SYMPTOMS
RESPIRATORY NEGATIVE: 1
GASTROINTESTINAL NEGATIVE: 1

## 2023-06-20 NOTE — PROGRESS NOTES
Verified name and birth date for privacy precautions. Chart reviewed in preparation for today's visit. Chief Complaint   Patient presents with    Cholesterol Problem          Health Maintenance Due   Topic    Colorectal Cancer Screen     Annual Wellness Visit (AWV)          Wt Readings from Last 3 Encounters:   06/20/23 (!) 328 lb (148.8 kg)   05/03/23 (!) 336 lb (152.4 kg)   12/20/22 (!) 326 lb 12.8 oz (148.2 kg)     Temp Readings from Last 3 Encounters:   06/20/23 98.3 °F (36.8 °C) (Temporal)     BP Readings from Last 3 Encounters:   06/20/23 112/63   05/03/23 115/64   12/20/22 130/80     Pulse Readings from Last 3 Encounters:   06/20/23 58   05/03/23 58   12/20/22 70       Social Determinants of Health     Tobacco Use: Low Risk     Smoking Tobacco Use: Never    Smokeless Tobacco Use: Never    Passive Exposure: Not on file   Alcohol Use: Not on file   Financial Resource Strain: Low Risk     Difficulty of Paying Living Expenses: Not hard at all   Food Insecurity: No Food Insecurity    Worried About [address] [employer] in the Last Year: Never true    Ran Out of Food in the Last Year: Never true   Transportation Needs: Unknown    Lack of Transportation (Medical): Not on file    Lack of Transportation (Non-Medical):  No   Physical Activity: Not on file   Stress: Not on file   Social Connections: Not on file   Intimate Partner Violence: Not on file   Depression: Not at risk    PHQ-2 Score: 0   Housing Stability: Unknown    Unable to Pay for Housing in the Last Year: Not on file    Number of Places Lived in the Last Year: Not on file    Unstable Housing in the Last Year: No

## 2023-06-20 NOTE — PROGRESS NOTES
Follow Up Visit    Lisa Baez is a 81 y.o. male. he presents for Cholesterol Problem    He was bitten by a tick earlier last week. Taken off by his wife. The area is healing. He was wanting the lesion to be looked at. Cardiovascular Review  The patient has hypertension. He reports taking medications as instructed, no medication side effects noted, no chest pain on exertion. Diet and Lifestyle: generally follows a low fat low cholesterol diet, generally follows a low sodium diet. Lab review: orders written for new lab studies as appropriate; see orders. We discussed that his blood pressure has been stable. He is moving to a [employer] community this summer. No other complaints. Patient Active Problem List   Diagnosis    Obesity, morbid ([address]    Melanoma ([facility]    Hx of adenomatous colonic polyps    Multinodular goiter    Hypertension    Hyperlipidemia    Gout    Dizzy    Pigmented purpura ([facility]         Prior to Admission medications    Medication Sig Start Date End Date Taking? Authorizing Provider   allopurinol (ZYLOPRIM) 300 MG tablet Take 300 mg by mouth daily 12/20/22   Ar Automatic Reconciliation   atorvastatin (LIPITOR) 10 MG tablet Take 10 mg by mouth daily 12/20/22   Ar Automatic Reconciliation   cyclobenzaprine (FLEXERIL) 5 MG tablet Take 5 mg by mouth 3 times daily as needed 12/20/22   Ar Automatic Reconciliation   furosemide (LASIX) 20 MG tablet TAKE 1/2 TAB BY MOUTH DAILY AS NEEDED (SWELLING).  10/27/21   Ar Automatic Reconciliation   indomethacin (INDOCIN SR) 75 MG extended release capsule Take 75 mg by mouth daily as needed 10/27/21   Ar Automatic Reconciliation   mirabegron (MYRBETRIQ) 25 MG TB24 Take 25 mg by mouth daily 9/26/19   Ar Automatic Reconciliation   tamsulosin (FLOMAX) 0.4 MG capsule Take 0.4 mg by mouth daily 9/5/19   Ar Automatic Reconciliation   triamcinolone (KENALOG) 0.1 % cream Apply topically 2 times daily 3/30/20   Ar Automatic Reconciliation   valsartan

## 2023-08-10 DIAGNOSIS — R73.09 ELEVATED GLUCOSE: ICD-10-CM

## 2023-08-10 DIAGNOSIS — I10 PRIMARY HYPERTENSION: ICD-10-CM

## 2023-08-10 DIAGNOSIS — E78.2 MIXED HYPERLIPIDEMIA: ICD-10-CM

## 2023-08-11 LAB
ALBUMIN SERPL-MCNC: 4.2 G/DL (ref 3.5–5)
ALBUMIN/GLOB SERPL: 1.6 (ref 1.1–2.2)
ALP SERPL-CCNC: 84 U/L (ref 45–117)
ALT SERPL-CCNC: 28 U/L (ref 12–78)
ANION GAP SERPL CALC-SCNC: 7 MMOL/L (ref 5–15)
AST SERPL-CCNC: 24 U/L (ref 15–37)
BASOPHILS # BLD: 0.1 K/UL (ref 0–0.1)
BASOPHILS NFR BLD: 1 % (ref 0–1)
BILIRUB SERPL-MCNC: 1 MG/DL (ref 0.2–1)
BUN SERPL-MCNC: 20 MG/DL (ref 6–20)
BUN/CREAT SERPL: 14 (ref 12–20)
CALCIUM SERPL-MCNC: 9 MG/DL (ref 8.5–10.1)
CHLORIDE SERPL-SCNC: 106 MMOL/L (ref 97–108)
CHOLEST SERPL-MCNC: 107 MG/DL
CO2 SERPL-SCNC: 26 MMOL/L (ref 21–32)
CREAT SERPL-MCNC: 1.41 MG/DL (ref 0.7–1.3)
DIFFERENTIAL METHOD BLD: ABNORMAL
EOSINOPHIL # BLD: 0.2 K/UL (ref 0–0.4)
EOSINOPHIL NFR BLD: 2 % (ref 0–7)
ERYTHROCYTE [DISTWIDTH] IN BLOOD BY AUTOMATED COUNT: 12.9 % (ref 11.5–14.5)
EST. AVERAGE GLUCOSE BLD GHB EST-MCNC: 108 MG/DL
GLOBULIN SER CALC-MCNC: 2.7 G/DL (ref 2–4)
GLUCOSE SERPL-MCNC: 82 MG/DL (ref 65–100)
HBA1C MFR BLD: 5.4 % (ref 4–5.6)
HCT VFR BLD AUTO: 36.8 % (ref 36.6–50.3)
HDLC SERPL-MCNC: 55 MG/DL
HDLC SERPL: 1.9 (ref 0–5)
HGB BLD-MCNC: 12.2 G/DL (ref 12.1–17)
IMM GRANULOCYTES # BLD AUTO: 0 K/UL (ref 0–0.04)
IMM GRANULOCYTES NFR BLD AUTO: 0 % (ref 0–0.5)
LDLC SERPL CALC-MCNC: 41 MG/DL (ref 0–100)
LYMPHOCYTES # BLD: 2.3 K/UL (ref 0.8–3.5)
LYMPHOCYTES NFR BLD: 27 % (ref 12–49)
MCH RBC QN AUTO: 32.3 PG (ref 26–34)
MCHC RBC AUTO-ENTMCNC: 33.2 G/DL (ref 30–36.5)
MCV RBC AUTO: 97.4 FL (ref 80–99)
MONOCYTES # BLD: 0.6 K/UL (ref 0–1)
MONOCYTES NFR BLD: 7 % (ref 5–13)
NEUTS SEG # BLD: 5.6 K/UL (ref 1.8–8)
NEUTS SEG NFR BLD: 63 % (ref 32–75)
NRBC # BLD: 0 K/UL (ref 0–0.01)
NRBC BLD-RTO: 0 PER 100 WBC
PLATELET # BLD AUTO: 197 K/UL (ref 150–400)
PMV BLD AUTO: 10.5 FL (ref 8.9–12.9)
POTASSIUM SERPL-SCNC: 4 MMOL/L (ref 3.5–5.1)
PROT SERPL-MCNC: 6.9 G/DL (ref 6.4–8.2)
RBC # BLD AUTO: 3.78 M/UL (ref 4.1–5.7)
SODIUM SERPL-SCNC: 139 MMOL/L (ref 136–145)
TRIGL SERPL-MCNC: 55 MG/DL
VLDLC SERPL CALC-MCNC: 11 MG/DL
WBC # BLD AUTO: 8.8 K/UL (ref 4.1–11.1)

## 2023-08-15 ENCOUNTER — OFFICE VISIT (OUTPATIENT)
Age: 73
End: 2023-08-15
Payer: MEDICARE

## 2023-08-15 VITALS
DIASTOLIC BLOOD PRESSURE: 60 MMHG | SYSTOLIC BLOOD PRESSURE: 130 MMHG | BODY MASS INDEX: 42.38 KG/M2 | HEART RATE: 69 BPM | HEIGHT: 72 IN | RESPIRATION RATE: 16 BRPM | OXYGEN SATURATION: 95 % | TEMPERATURE: 97.1 F | WEIGHT: 312.9 LBS

## 2023-08-15 DIAGNOSIS — I10 PRIMARY HYPERTENSION: ICD-10-CM

## 2023-08-15 DIAGNOSIS — Z00.00 MEDICARE ANNUAL WELLNESS VISIT, SUBSEQUENT: Primary | ICD-10-CM

## 2023-08-15 DIAGNOSIS — R79.89 ELEVATED SERUM CREATININE: ICD-10-CM

## 2023-08-15 DIAGNOSIS — E78.2 MIXED HYPERLIPIDEMIA: ICD-10-CM

## 2023-08-15 PROCEDURE — 1123F ACP DISCUSS/DSCN MKR DOCD: CPT | Performed by: INTERNAL MEDICINE

## 2023-08-15 PROCEDURE — 3074F SYST BP LT 130 MM HG: CPT | Performed by: INTERNAL MEDICINE

## 2023-08-15 PROCEDURE — G0439 PPPS, SUBSEQ VISIT: HCPCS | Performed by: INTERNAL MEDICINE

## 2023-08-15 PROCEDURE — 3017F COLORECTAL CA SCREEN DOC REV: CPT | Performed by: INTERNAL MEDICINE

## 2023-08-15 PROCEDURE — 3078F DIAST BP <80 MM HG: CPT | Performed by: INTERNAL MEDICINE

## 2023-08-15 SDOH — ECONOMIC STABILITY: FOOD INSECURITY: WITHIN THE PAST 12 MONTHS, THE FOOD YOU BOUGHT JUST DIDN'T LAST AND YOU DIDN'T HAVE MONEY TO GET MORE.: NEVER TRUE

## 2023-08-15 SDOH — ECONOMIC STABILITY: FOOD INSECURITY: WITHIN THE PAST 12 MONTHS, YOU WORRIED THAT YOUR FOOD WOULD RUN OUT BEFORE YOU GOT MONEY TO BUY MORE.: NEVER TRUE

## 2023-08-15 SDOH — ECONOMIC STABILITY: INCOME INSECURITY: HOW HARD IS IT FOR YOU TO PAY FOR THE VERY BASICS LIKE FOOD, HOUSING, MEDICAL CARE, AND HEATING?: NOT HARD AT ALL

## 2023-08-15 ASSESSMENT — PATIENT HEALTH QUESTIONNAIRE - PHQ9
SUM OF ALL RESPONSES TO PHQ QUESTIONS 1-9: 0
1. LITTLE INTEREST OR PLEASURE IN DOING THINGS: 0
SUM OF ALL RESPONSES TO PHQ QUESTIONS 1-9: 0
SUM OF ALL RESPONSES TO PHQ9 QUESTIONS 1 & 2: 0
SUM OF ALL RESPONSES TO PHQ QUESTIONS 1-9: 0
SUM OF ALL RESPONSES TO PHQ QUESTIONS 1-9: 0
2. FEELING DOWN, DEPRESSED OR HOPELESS: 0

## 2023-08-15 ASSESSMENT — LIFESTYLE VARIABLES
HOW OFTEN DO YOU HAVE A DRINK CONTAINING ALCOHOL: 2-4 TIMES A MONTH
HOW MANY STANDARD DRINKS CONTAINING ALCOHOL DO YOU HAVE ON A TYPICAL DAY: 1 OR 2

## 2023-08-15 NOTE — PATIENT INSTRUCTIONS
lifestyle, illnesses that may run in your family, and various assessments and screenings as appropriate. After reviewing your medical record and screening and assessments performed today your provider may have ordered immunizations, labs, imaging, and/or referrals for you. A list of these orders (if applicable) as well as your Preventive Care list are included within your After Visit Summary for your review. Other Preventive Recommendations:    A preventive eye exam performed by an eye specialist is recommended every 1-2 years to screen for glaucoma; cataracts, macular degeneration, and other eye disorders. A preventive dental visit is recommended every 6 months. Try to get at least 150 minutes of exercise per week or 10,000 steps per day on a pedometer . Order or download the FREE \"Exercise & Physical Activity: Your Everyday Guide\" from [employer]. Call 7-367.957.5968 or search [employer] online. You need 4522-6650 mg of calcium and 8880-5791 IU of vitamin D per day. It is possible to meet your calcium requirement with diet alone, but a vitamin D supplement is usually necessary to meet this goal.  When exposed to the sun, use a sunscreen that protects against both UVA and UVB radiation with an SPF of 30 or greater. Reapply every 2 to 3 hours or after sweating, drying off with a towel, or swimming. Always wear a seat belt when traveling in a car. Always wear a helmet when riding a bicycle or motorcycle.

## 2023-10-20 ENCOUNTER — OFFICE VISIT (OUTPATIENT)
Age: 73
End: 2023-10-20
Payer: MEDICARE

## 2023-10-20 VITALS
BODY MASS INDEX: 42.53 KG/M2 | DIASTOLIC BLOOD PRESSURE: 61 MMHG | HEART RATE: 49 BPM | OXYGEN SATURATION: 96 % | TEMPERATURE: 97.2 F | SYSTOLIC BLOOD PRESSURE: 121 MMHG | RESPIRATION RATE: 16 BRPM | WEIGHT: 314 LBS | HEIGHT: 72 IN

## 2023-10-20 DIAGNOSIS — J02.0 STREP PHARYNGITIS: Primary | ICD-10-CM

## 2023-10-20 LAB
GROUP A STREP ANTIGEN, POC: POSITIVE
VALID INTERNAL CONTROL, POC: YES

## 2023-10-20 PROCEDURE — 87880 STREP A ASSAY W/OPTIC: CPT | Performed by: NURSE PRACTITIONER

## 2023-10-20 PROCEDURE — 99213 OFFICE O/P EST LOW 20 MIN: CPT | Performed by: NURSE PRACTITIONER

## 2023-10-20 RX ORDER — AMOXICILLIN 500 MG/1
500 CAPSULE ORAL 2 TIMES DAILY
Qty: 20 CAPSULE | Refills: 0 | Status: SHIPPED | OUTPATIENT
Start: 2023-10-20 | End: 2023-10-30

## 2023-10-20 ASSESSMENT — ENCOUNTER SYMPTOMS: SORE THROAT: 1

## 2023-11-01 ENCOUNTER — OFFICE VISIT (OUTPATIENT)
Age: 73
End: 2023-11-01
Payer: MEDICARE

## 2023-11-01 VITALS
RESPIRATION RATE: 16 BRPM | HEIGHT: 72 IN | BODY MASS INDEX: 42.66 KG/M2 | TEMPERATURE: 97 F | SYSTOLIC BLOOD PRESSURE: 116 MMHG | DIASTOLIC BLOOD PRESSURE: 68 MMHG | HEART RATE: 56 BPM | WEIGHT: 315 LBS | OXYGEN SATURATION: 98 %

## 2023-11-01 DIAGNOSIS — M10.061 IDIOPATHIC GOUT OF RIGHT KNEE, UNSPECIFIED CHRONICITY: ICD-10-CM

## 2023-11-01 DIAGNOSIS — R79.89 ELEVATED SERUM CREATININE: Primary | ICD-10-CM

## 2023-11-01 DIAGNOSIS — R79.89 ELEVATED SERUM CREATININE: ICD-10-CM

## 2023-11-01 DIAGNOSIS — R09.82 POSTNASAL DRIP: ICD-10-CM

## 2023-11-01 PROCEDURE — 99214 OFFICE O/P EST MOD 30 MIN: CPT | Performed by: STUDENT IN AN ORGANIZED HEALTH CARE EDUCATION/TRAINING PROGRAM

## 2023-11-01 RX ORDER — INDOMETHACIN 25 MG/1
25 CAPSULE ORAL 3 TIMES DAILY
Qty: 60 CAPSULE | Refills: 3 | Status: SHIPPED | OUTPATIENT
Start: 2023-11-01

## 2023-11-01 ASSESSMENT — ENCOUNTER SYMPTOMS
CONSTIPATION: 0
VOMITING: 0
NAUSEA: 0
SORE THROAT: 1
SHORTNESS OF BREATH: 0
BLOOD IN STOOL: 0
COUGH: 0
ABDOMINAL PAIN: 0
DIARRHEA: 0

## 2023-11-02 LAB
ALBUMIN SERPL-MCNC: 3.8 G/DL (ref 3.5–5)
ALBUMIN/GLOB SERPL: 1.2 (ref 1.1–2.2)
ALP SERPL-CCNC: 79 U/L (ref 45–117)
ALT SERPL-CCNC: 22 U/L (ref 12–78)
ANION GAP SERPL CALC-SCNC: 4 MMOL/L (ref 5–15)
AST SERPL-CCNC: 21 U/L (ref 15–37)
BILIRUB SERPL-MCNC: 0.4 MG/DL (ref 0.2–1)
BUN SERPL-MCNC: 22 MG/DL (ref 6–20)
BUN/CREAT SERPL: 23 (ref 12–20)
CALCIUM SERPL-MCNC: 9 MG/DL (ref 8.5–10.1)
CHLORIDE SERPL-SCNC: 108 MMOL/L (ref 97–108)
CO2 SERPL-SCNC: 27 MMOL/L (ref 21–32)
CREAT SERPL-MCNC: 0.97 MG/DL (ref 0.7–1.3)
GLOBULIN SER CALC-MCNC: 3.3 G/DL (ref 2–4)
GLUCOSE SERPL-MCNC: 106 MG/DL (ref 65–100)
POTASSIUM SERPL-SCNC: 4.7 MMOL/L (ref 3.5–5.1)
PROT SERPL-MCNC: 7.1 G/DL (ref 6.4–8.2)
SODIUM SERPL-SCNC: 139 MMOL/L (ref 136–145)

## 2023-12-27 RX ORDER — ATORVASTATIN CALCIUM 10 MG/1
10 TABLET, FILM COATED ORAL DAILY
Qty: 90 TABLET | Refills: 0 | Status: SHIPPED | OUTPATIENT
Start: 2023-12-27

## 2023-12-27 RX ORDER — VERAPAMIL HYDROCHLORIDE 240 MG/1
240 TABLET, FILM COATED, EXTENDED RELEASE ORAL 2 TIMES DAILY
Qty: 180 TABLET | Refills: 0 | Status: SHIPPED | OUTPATIENT
Start: 2023-12-27 | End: 2024-03-26

## 2023-12-27 RX ORDER — VALSARTAN 160 MG/1
160 TABLET ORAL DAILY
Qty: 90 TABLET | Refills: 1 | Status: SHIPPED | OUTPATIENT
Start: 2023-12-27

## 2023-12-27 RX ORDER — ALLOPURINOL 300 MG/1
300 TABLET ORAL DAILY
Qty: 90 TABLET | Refills: 0 | Status: SHIPPED | OUTPATIENT
Start: 2023-12-27

## 2023-12-27 NOTE — TELEPHONE ENCOUNTER
----- Message from [facility] sent at 12/27/2023  2:58 PM EST -----  Regarding: Refill request on the NEW My Chart   Contact: [phone]  Sorry to bother you but the transition to the new system has left me unable to refill scrips or save my med records going away 31 Dec.      The scrips I'm requesting are:   atorvastatin (LIPITOR) 10 mg tablet Take 1 Tablet by mouth daily. by Monica Washington MD ;  allopurinoL (ZYLOPRIM) 300 mg tablet  Take 1 Tablet by mouth daily. by  Monica Washington MD ;   valsartan (DIOVAN) 160 mg tablet  Take 1 Tablet by mouth daily. by Monica Washington MD and verapamil ER (CALAN-SR) 240 mg CR tablet Take 1 Tablet by mouth two (2) times a day   by Monica Washington MD --  All from [facility]. The data I'm trying to save are the labs, x rays and other pictures submitted and saved under Dr Constantino Chappell and Dr Pablo Montoya. I'm trying to preserve your access as opposed to just trying to print all for now. If there is anything I can do pls email back and I'll check back 3-4 times a day. I also have a request into MyChart Admin to save the info as well. System has not understood my transition to your care vs Dr Constantino Chappell.     Thanks in Advance,  Gurjit

## 2024-02-20 ENCOUNTER — OFFICE VISIT (OUTPATIENT)
Age: 74
End: 2024-02-20
Payer: MEDICARE

## 2024-02-20 VITALS
WEIGHT: 315 LBS | DIASTOLIC BLOOD PRESSURE: 65 MMHG | RESPIRATION RATE: 16 BRPM | OXYGEN SATURATION: 95 % | BODY MASS INDEX: 42.66 KG/M2 | HEART RATE: 66 BPM | TEMPERATURE: 98.4 F | HEIGHT: 72 IN | SYSTOLIC BLOOD PRESSURE: 107 MMHG

## 2024-02-20 DIAGNOSIS — E66.01 CLASS 3 SEVERE OBESITY DUE TO EXCESS CALORIES WITH SERIOUS COMORBIDITY AND BODY MASS INDEX (BMI) OF 45.0 TO 49.9 IN ADULT (HCC): Primary | ICD-10-CM

## 2024-02-20 DIAGNOSIS — I10 PRIMARY HYPERTENSION: ICD-10-CM

## 2024-02-20 DIAGNOSIS — M1A.00X0 IDIOPATHIC CHRONIC GOUT WITHOUT TOPHUS, UNSPECIFIED SITE: ICD-10-CM

## 2024-02-20 DIAGNOSIS — Z12.11 COLON CANCER SCREENING: ICD-10-CM

## 2024-02-20 PROBLEM — E66.813 CLASS 3 SEVERE OBESITY DUE TO EXCESS CALORIES WITH SERIOUS COMORBIDITY AND BODY MASS INDEX (BMI) OF 45.0 TO 49.9 IN ADULT: Status: ACTIVE | Noted: 2018-03-13

## 2024-02-20 PROCEDURE — 1123F ACP DISCUSS/DSCN MKR DOCD: CPT | Performed by: STUDENT IN AN ORGANIZED HEALTH CARE EDUCATION/TRAINING PROGRAM

## 2024-02-20 PROCEDURE — 3017F COLORECTAL CA SCREEN DOC REV: CPT | Performed by: STUDENT IN AN ORGANIZED HEALTH CARE EDUCATION/TRAINING PROGRAM

## 2024-02-20 PROCEDURE — 99214 OFFICE O/P EST MOD 30 MIN: CPT | Performed by: STUDENT IN AN ORGANIZED HEALTH CARE EDUCATION/TRAINING PROGRAM

## 2024-02-20 PROCEDURE — G8417 CALC BMI ABV UP PARAM F/U: HCPCS | Performed by: STUDENT IN AN ORGANIZED HEALTH CARE EDUCATION/TRAINING PROGRAM

## 2024-02-20 PROCEDURE — 3078F DIAST BP <80 MM HG: CPT | Performed by: STUDENT IN AN ORGANIZED HEALTH CARE EDUCATION/TRAINING PROGRAM

## 2024-02-20 PROCEDURE — 3074F SYST BP LT 130 MM HG: CPT | Performed by: STUDENT IN AN ORGANIZED HEALTH CARE EDUCATION/TRAINING PROGRAM

## 2024-02-20 PROCEDURE — 1036F TOBACCO NON-USER: CPT | Performed by: STUDENT IN AN ORGANIZED HEALTH CARE EDUCATION/TRAINING PROGRAM

## 2024-02-20 PROCEDURE — G8427 DOCREV CUR MEDS BY ELIG CLIN: HCPCS | Performed by: STUDENT IN AN ORGANIZED HEALTH CARE EDUCATION/TRAINING PROGRAM

## 2024-02-20 PROCEDURE — G8484 FLU IMMUNIZE NO ADMIN: HCPCS | Performed by: STUDENT IN AN ORGANIZED HEALTH CARE EDUCATION/TRAINING PROGRAM

## 2024-02-20 RX ORDER — FUROSEMIDE 20 MG/1
TABLET ORAL
Qty: 60 TABLET | Status: CANCELLED | OUTPATIENT
Start: 2024-02-20

## 2024-02-20 RX ORDER — FUROSEMIDE 20 MG/1
10 TABLET ORAL DAILY PRN
Qty: 60 TABLET | Refills: 3 | Status: SHIPPED | OUTPATIENT
Start: 2024-02-20

## 2024-02-20 RX ORDER — KETOCONAZOLE 20 MG/G
CREAM TOPICAL
COMMUNITY
Start: 2019-09-08

## 2024-02-20 ASSESSMENT — PATIENT HEALTH QUESTIONNAIRE - PHQ9
SUM OF ALL RESPONSES TO PHQ9 QUESTIONS 1 & 2: 0
SUM OF ALL RESPONSES TO PHQ QUESTIONS 1-9: 0
SUM OF ALL RESPONSES TO PHQ QUESTIONS 1-9: 0
2. FEELING DOWN, DEPRESSED OR HOPELESS: 0
SUM OF ALL RESPONSES TO PHQ QUESTIONS 1-9: 0
SUM OF ALL RESPONSES TO PHQ QUESTIONS 1-9: 0
1. LITTLE INTEREST OR PLEASURE IN DOING THINGS: 0

## 2024-02-20 ASSESSMENT — ENCOUNTER SYMPTOMS
CONSTIPATION: 0
ABDOMINAL PAIN: 0
NAUSEA: 0
BLOOD IN STOOL: 0
DIARRHEA: 0
SHORTNESS OF BREATH: 0
VOMITING: 0
COUGH: 0

## 2024-02-20 NOTE — PROGRESS NOTES
Verified name and birth date for privacy precautions.   Chart reviewed in preparation for today's visit.     Chief Complaint   Patient presents with    New Patient          Health Maintenance Due   Topic    Respiratory Syncytial Virus (RSV) Pregnant or age 60 yrs+ (1 - 1-dose 60+ series)    Colorectal Cancer Screen          Wt Readings from Last 3 Encounters:   02/20/24 (!) 155.6 kg (343 lb)   11/01/23 (!) 145.2 kg (320 lb)   10/20/23 (!) 142.4 kg (314 lb)     Temp Readings from Last 3 Encounters:   02/20/24 98.4 °F (36.9 °C) (Oral)   11/01/23 97 °F (36.1 °C) (Temporal)   10/20/23 97.2 °F (36.2 °C) (Temporal)     BP Readings from Last 3 Encounters:   02/20/24 107/65   11/01/23 116/68   10/20/23 121/61     Pulse Readings from Last 3 Encounters:   02/20/24 66   11/01/23 56   10/20/23 (!) 49       Social Determinants of Health     Tobacco Use: Low Risk  (2/20/2024)    Patient History     Smoking Tobacco Use: Never     Smokeless Tobacco Use: Never     Passive Exposure: Not on file   Alcohol Use: Not At Risk (8/15/2023)    AUDIT-C     Frequency of Alcohol Consumption: 2-4 times a month     Average Number of Drinks: 1 or 2     Frequency of Binge Drinking: Never   Financial Resource Strain: Low Risk  (8/15/2023)    Overall Financial Resource Strain (CARDIA)     Difficulty of Paying Living Expenses: Not hard at all   Food Insecurity: Not on file (8/15/2023)   Transportation Needs: Unknown (8/15/2023)    PRAPARE - Transportation     Lack of Transportation (Medical): Not on file     Lack of Transportation (Non-Medical): No   Physical Activity: Insufficiently Active (8/15/2023)    Exercise Vital Sign     Days of Exercise per Week: 3 days     Minutes of Exercise per Session: 20 min   Stress: Not on file   Social Connections: Not on file   Intimate Partner Violence: Not on file   Depression: Not at risk (2/20/2024)    PHQ-2     PHQ-2 Score: 0   Housing Stability: Unknown (8/15/2023)    Housing Stability Vital Sign     Unable to 
(ZYLOPRIM) 300 MG tablet Take 1 tablet by mouth daily 12/27/23  Yes Noe Power APRN - NP   verapamil (CALAN SR) 240 MG extended release tablet Take 1 tablet by mouth 2 times daily 12/27/23 3/26/24 Yes Noe Power APRN - NP   atorvastatin (LIPITOR) 10 MG tablet Take 1 tablet by mouth daily 12/27/23  Yes Noe Power APRN - NP   indomethacin (INDOCIN) 25 MG capsule Take 1 capsule by mouth 3 times daily As needed for gout flare 11/1/23  Yes Jose Dee DO   mirabegron (MYRBETRIQ) 25 MG TB24 Take 1 tablet by mouth daily 9/26/19  Yes Automatic Reconciliation, Ar   tamsulosin (FLOMAX) 0.4 MG capsule Take 1 capsule by mouth daily 9/5/19  Yes Automatic Reconciliation, Ar   triamcinolone (KENALOG) 0.1 % cream Apply topically 2 times daily 3/30/20  Yes Automatic Reconciliation, Ar   indomethacin (INDOCIN SR) 75 MG extended release capsule Take 1 capsule by mouth daily as needed  Patient not taking: Reported on 2/20/2024 10/27/21   Automatic Reconciliation, Ar       Surgical History    Past Surgical History:   Procedure Laterality Date    COLONOSCOPY N/A 5/29/2018    COLONOSCOPY performed by Jerry Eddy MD at [facility] ENDOSCOPY    HEENT Left     partial thyroidectomy    ORTHOPEDIC SURGERY  1979    dupytrens contractures    ORTHOPEDIC SURGERY  1969    Left knee ACL     SKIN TISSUE PROCEDURE UNLISTED      THYROIDECTOMY  05/2013    TONSILLECTOMY  1950       Family History    Family History   Problem Relation Age of Onset    Cancer Brother         Stomach     Diabetes Mother     Heart Disease Mother         heart valve replaced    Hypertension Father     Colon Polyps Father         Social History    Social History     Occupational History    Not on file   Tobacco Use    Smoking status: Never    Smokeless tobacco: Never   Substance and Sexual Activity    Alcohol use: Yes     Alcohol/week: 1.0 standard drink of alcohol    Drug use: No    Sexual activity: Not Currently        The following sections were

## 2024-02-20 NOTE — ASSESSMENT & PLAN NOTE
We had a long discussion regarding diet and lifestyle modification as well as medications. He is agreeable to meet with a dietician to improve his diet and says he is going to find a place to start walking. He also wants to try Contrave as an adjunctive therapy.

## 2024-02-21 ENCOUNTER — TRANSCRIBE ORDERS (OUTPATIENT)
Facility: HOSPITAL | Age: 74
End: 2024-02-21

## 2024-02-21 DIAGNOSIS — E66.01 CLASS 3 SEVERE OBESITY DUE TO EXCESS CALORIES WITH SERIOUS COMORBIDITY AND BODY MASS INDEX (BMI) GREATER THAN OR EQUAL TO 70 IN ADULT (HCC): Primary | ICD-10-CM

## 2024-03-05 ENCOUNTER — HOSPITAL ENCOUNTER (OUTPATIENT)
Facility: HOSPITAL | Age: 74
Setting detail: RECURRING SERIES
Discharge: HOME | End: 2024-03-08
Payer: MEDICARE

## 2024-03-05 PROCEDURE — 97802 MEDICAL NUTRITION INDIV IN: CPT

## 2024-03-05 NOTE — PROGRESS NOTES
from a large piece of property to an independent living condo and notes that he isn't as active as he used to be. Not walking regularly, has not been using the gym where he lives. He is concerned about his weight and in the past week has started taking Contrave to help with weight loss.   Pt lives with his wife at home. Pt is eating food prepared at home but is very limited in cooking skill and relying on easy to prepare choices. Since starting Contrave pt is seeing some improvement in mindfulness around food choices and noticing that he is no longer waking up to eat in the night.      Food & Nutrition: B-   S- 4 oranges across the day   L-   S- Banana or yogurt cup   D- 2-3pm Early meal - 8 oz pack Cheese and no salt saltine crackers, microwave chicken breast  S-   Drinks: Coffee (milk and/or sugar 100 twin), 4 liters of water daily, soda     Cut out artificial sugars as much as possible      Limiting eating out once every 1-2 weeks, [employer] [place] salad, occasionally pizza     Not as many vegetables in the day      Estimate Needs:    Equation( [x] MSJ ; []  HBE; [] Power; [] other)  * Activity Factor (1.2) - 500   Calories:  2300 Protein: 144 Carbs: 259 Fat: 77   Kcal/day  g/day  g/day  g/day        percent: 25  45  30               Nutrition Diagnosis Obesity R/T excessive caloric intake, inactivity AEB pt 24-hour food recall, BMI >40, pt stated lack of activity and increase in weight since move into apartment.      Nutrition Intervention &  Education: Educated pt on the basics of healthy weight loss and the rationale for dietary modifications and increased activity. Educated pt on lean proteins, healthy fats, non-starchy vegetables, and complex carbohydrate food sources. Discussed limiting carbohydrates, label reading, meal timing, and appropriate serving sizes. Encouraged pt to avoid sugary beverages. Reviewed meal builder tool, calorie and macro breakdown as well as exercise parameters.

## 2024-03-12 RX ORDER — VERAPAMIL HYDROCHLORIDE 240 MG/1
240 TABLET, FILM COATED, EXTENDED RELEASE ORAL 2 TIMES DAILY
Qty: 180 TABLET | Refills: 3 | Status: SHIPPED | OUTPATIENT
Start: 2024-03-12

## 2024-03-12 RX ORDER — ALLOPURINOL 300 MG/1
300 TABLET ORAL DAILY
Qty: 90 TABLET | Refills: 3 | Status: SHIPPED | OUTPATIENT
Start: 2024-03-12

## 2024-03-12 RX ORDER — ATORVASTATIN CALCIUM 10 MG/1
10 TABLET, FILM COATED ORAL DAILY
Qty: 90 TABLET | Refills: 3 | Status: SHIPPED | OUTPATIENT
Start: 2024-03-12

## 2024-03-29 ENCOUNTER — HOSPITAL ENCOUNTER (OUTPATIENT)
Facility: HOSPITAL | Age: 74
Setting detail: RECURRING SERIES
Discharge: HOME | End: 2024-04-01
Payer: MEDICARE

## 2024-03-29 PROCEDURE — 97803 MED NUTRITION INDIV SUBSEQ: CPT

## 2024-03-29 NOTE — PROGRESS NOTES
and  Intervention Educated pt on the basics of healthy weight loss and the rationale for dietary modifications and increased activity. Educated pt on lean proteins, healthy fats, non-starchy vegetables, and complex carbohydrate food sources. Discussed limiting carbohydrates, label reading, meal timing, and appropriate serving sizes. Encouraged pt to avoid sugary beverages. Reviewed meal builder tool, calorie and macro breakdown as well as exercise parameters.      Patient Education:  [x]  Review current plan with patient   []  Other:    Handouts/  Information Provided: []  Carbohydrates  []  Protein  []  Fiber  []  Serving Sizes  []  Fluids  []  General guidelines []  Diabetes  []  Cholesterol  []  Sodium  []  SBGM  []  Food Journals  []  Others:      Patient Goals - Have a minimum of 20g of protein per meal  - Walking more w/ nice weather   - Increasing non-starchy vegetables  - Aim for 25-30g fiber per day      PLAN  [x]  Continue on current plan []  Follow-up PRN   []  Discharge due to :    [x]  Next appt:  2-4 weeks      Dietitian: Julianna Lau, MPH, RDN    Date: 3/29/2024 Time: 1035am

## 2024-05-21 ENCOUNTER — HOSPITAL ENCOUNTER (OUTPATIENT)
Facility: HOSPITAL | Age: 74
Setting detail: RECURRING SERIES
Discharge: HOME | End: 2024-05-24

## 2024-05-21 PROCEDURE — 97803 MED NUTRITION INDIV SUBSEQ: CPT

## 2024-05-21 NOTE — PROGRESS NOTES
NUTRITION - FOLLOW-UP TREATMENT NOTE  Patient Name: Regino Matthews         Date: 2024  : 1950    YES Patient  Verified  Diagnosis: E66.01, Z68.45 (ICD-10-CM) - Class 3 severe obesity due to excess calories with serious comorbidity and body mass index (BMI) greater than or equal to 40 in adult (HCC)     In time:   1010am             Out time:   1040am   Total Treatment Time (min):   30     SUBJECTIVE/ASSESSMENT  Current Wt: 328.6 Previous Wt: 318 Wt Change: +10.6     Initial Wt: 327.6 Total Wt change: +1 Height: 72     Changes in medication or medical history? Any new allergies, surgeries or procedures?    YES/NO    If yes, update Summary List        Nutrition Diagnosis        Diagnosis Status: Obesity R/T excessive caloric intake, inactivity AEB pt 24-hour food recall, BMI >40, pt stated lack of activity and increase in weight since move into apartment.   [x]  Improved []  No Change    []  Declined   []  Discontinued        Nutrition Monitoring and Evaluation: Pt seen today for follow-up visit.     Pt got off track over the past month and wants to restart. Pt noted more carbs and sweets with some birthday celebrations.     Pt has not been tracking foods. Wanting to try a new website, recommended My Net Diary. Encouraged pt to limit the carbohydrate foods and focus on choosing protein options other than cheese. Pt had some questions about fiber. Reviewed recommended intake of fruits and vegetables. Reviewed looking got whole grain food choices.     Pt has been working on getting his bike fixed. Pt has also been active around his house moving boxes from a storage unit and cleaning out. Pt recognizes that this is not enough daily exercise.     Previous Goals:   - Have a minimum of 20g of protein per meal  - Walking more w/ nice weather   - Increasing non-starchy vegetables  - Aim for 25-30g fiber per day      Nutrition Prescription and  Intervention Educated pt on the basics of healthy weight loss and

## 2024-06-03 RX ORDER — VALSARTAN 160 MG/1
160 TABLET ORAL DAILY
Qty: 90 TABLET | Refills: 0 | Status: SHIPPED | OUTPATIENT
Start: 2024-06-03

## 2024-06-03 NOTE — TELEPHONE ENCOUNTER
Medication Refill Request    Regino Byron is requesting a refill of the following medication(s):   valsartan (DIOVAN) 160 MG tablet               Please send refill to:     [facility] - [place] - [facility] - P [phone] - F [phone]  [facility]  [place] [address]  Phone: [phone] Fax: [phone]

## 2024-06-03 NOTE — TELEPHONE ENCOUNTER
Pt last seen on 2/28/24. Due to return in 6 months.    Has appt on 8/20/24.    Rx last filled on 12/27/23 #90/1RF.    Will forward to MD for refill.

## 2024-06-25 ENCOUNTER — TELEPHONE (OUTPATIENT)
Age: 74
End: 2024-06-25

## 2024-06-25 ENCOUNTER — OFFICE VISIT (OUTPATIENT)
Age: 74
End: 2024-06-25
Payer: MEDICARE

## 2024-06-25 VITALS
HEIGHT: 72 IN | BODY MASS INDEX: 42.66 KG/M2 | TEMPERATURE: 97.4 F | DIASTOLIC BLOOD PRESSURE: 70 MMHG | HEART RATE: 64 BPM | RESPIRATION RATE: 16 BRPM | WEIGHT: 315 LBS | OXYGEN SATURATION: 95 % | SYSTOLIC BLOOD PRESSURE: 130 MMHG

## 2024-06-25 DIAGNOSIS — L03.115 CELLULITIS OF RIGHT LOWER EXTREMITY: Primary | ICD-10-CM

## 2024-06-25 DIAGNOSIS — R41.3 MEMORY IMPAIRMENT: ICD-10-CM

## 2024-06-25 PROCEDURE — 3075F SYST BP GE 130 - 139MM HG: CPT | Performed by: STUDENT IN AN ORGANIZED HEALTH CARE EDUCATION/TRAINING PROGRAM

## 2024-06-25 PROCEDURE — 3078F DIAST BP <80 MM HG: CPT | Performed by: STUDENT IN AN ORGANIZED HEALTH CARE EDUCATION/TRAINING PROGRAM

## 2024-06-25 PROCEDURE — 1036F TOBACCO NON-USER: CPT | Performed by: STUDENT IN AN ORGANIZED HEALTH CARE EDUCATION/TRAINING PROGRAM

## 2024-06-25 PROCEDURE — G8428 CUR MEDS NOT DOCUMENT: HCPCS | Performed by: STUDENT IN AN ORGANIZED HEALTH CARE EDUCATION/TRAINING PROGRAM

## 2024-06-25 PROCEDURE — 1123F ACP DISCUSS/DSCN MKR DOCD: CPT | Performed by: STUDENT IN AN ORGANIZED HEALTH CARE EDUCATION/TRAINING PROGRAM

## 2024-06-25 PROCEDURE — G8417 CALC BMI ABV UP PARAM F/U: HCPCS | Performed by: STUDENT IN AN ORGANIZED HEALTH CARE EDUCATION/TRAINING PROGRAM

## 2024-06-25 PROCEDURE — 99213 OFFICE O/P EST LOW 20 MIN: CPT | Performed by: STUDENT IN AN ORGANIZED HEALTH CARE EDUCATION/TRAINING PROGRAM

## 2024-06-25 PROCEDURE — 3017F COLORECTAL CA SCREEN DOC REV: CPT | Performed by: STUDENT IN AN ORGANIZED HEALTH CARE EDUCATION/TRAINING PROGRAM

## 2024-06-25 RX ORDER — CEFDINIR 300 MG/1
300 CAPSULE ORAL
COMMUNITY
Start: 2024-06-22

## 2024-06-25 RX ORDER — DOXYCYCLINE HYCLATE 100 MG
100 TABLET ORAL 2 TIMES DAILY
COMMUNITY
Start: 2024-06-22

## 2024-06-25 NOTE — PROGRESS NOTES
Regino Matthews (: 1950) is a 73 y.o. male patient here for evaluation of the following chief complaint(s):  Follow-Up from Hospital ([facility] 2024 thr 2024)       Subjective:   - Admitted to [facility] initially for heat exhaustion, then discovered to have RLE cellulitis causing septic shock. Started on IV ceftriaxone on admission - discharged on omnicef and doxycycline.   - Reports doing much better, his leg is continuing to improve  - Has three days of abx left to finish    Memory impairment  - His wife is here with him today who reports during hospitalization he became agitated and confused and pulled out his IV. Says that he became quite belligerent.   - Also reports increasing forgetfulness at home since they moved about a year ago         Review of Systems   All other systems reviewed and are negative.        PMHx:  Patient Active Problem List   Diagnosis    Class 3 severe obesity due to excess calories with serious comorbidity and body mass index (BMI) of 45.0 to 49.9 in adult (HCC)    Melanoma (HCC)    Hx of adenomatous colonic polyps    Multinodular goiter    Hypertension    Hyperlipidemia    Gout    Dizzy    Pigmented purpura (HCC)       Prior to Admission medications    Medication Sig Start Date End Date Taking? Authorizing Provider   cefdinir (OMNICEF) 300 MG capsule Take 1 capsule by mouth 24  Yes Provider, MD Emir   doxycycline hyclate (VIBRA-TABS) 100 MG tablet Take 1 tablet by mouth 2 times daily 24  Yes Provider, MD Emir   valsartan (DIOVAN) 160 MG tablet Take 1 tablet by mouth daily 6/3/24  Yes Jose Dee DO   allopurinol (ZYLOPRIM) 300 MG tablet TAKE 1 TABLET DAILY 3/12/24  Yes Jose Dee DO   verapamil (CALAN SR) 240 MG extended release tablet TAKE 1 TABLET TWICE A DAY 3/12/24  Yes Jose Dee DO   atorvastatin (LIPITOR) 10 MG tablet TAKE 1 TABLET DAILY 3/12/24  Yes Jose Dee DO   ketoconazole (NIZORAL) 2 % cream  19  Yes

## 2024-06-25 NOTE — PROGRESS NOTES
Chief Complaint   Patient presents with    Follow-Up from [facility] 2024 thru 2024     eviewed record in preparation for visit and have obtained necessary documentation.    Identified pt with two pt identifiers(name and ).      Health Maintenance Due   Topic    Respiratory Syncytial Virus (RSV) Pregnant or age 60 yrs+ (1 - 1-dose 60+ series)    Colorectal Cancer Screen          Chief Complaint   Patient presents with    Follow-Up from [facility] 2024 thru 2024        Wt Readings from Last 3 Encounters:   24 (!) 155.6 kg (343 lb)   23 (!) 145.2 kg (320 lb)   10/20/23 (!) 142.4 kg (314 lb)     Temp Readings from Last 3 Encounters:   24 98.4 °F (36.9 °C) (Oral)   23 97 °F (36.1 °C) (Temporal)   10/20/23 97.2 °F (36.2 °C) (Temporal)     BP Readings from Last 3 Encounters:   24 107/65   23 116/68   10/20/23 121/61     Pulse Readings from Last 3 Encounters:   24 66   23 56   10/20/23 (!) 49           Learning Assessment:  :    Failed to redirect to the Timeline version of the [employer] SmartLink.    Depression Screening:  :         No data to display                Fall Risk Assessment:  :    Failed to redirect to the Timeline version of the [employer] SmartLink.    Abuse Screening:  :         No data to display                Coordination of Care Questionnaire:  :    1) Have you been to an emergency room, urgent care clinic since your last visit? yes   Hospitalized since your last visit? yes             2) Have you seen or consulted any other health care providers outside of [facility] since your last visit? yes  (Include any pap smears or colon screenings in this section.)    3) Do you have an Advance Directive on file? no    4) Are you interested in receiving information on Advance Directives? No

## 2024-07-02 ENCOUNTER — OFFICE VISIT (OUTPATIENT)
Age: 74
End: 2024-07-02
Payer: MEDICARE

## 2024-07-02 ENCOUNTER — TELEPHONE (OUTPATIENT)
Age: 74
End: 2024-07-02

## 2024-07-02 ENCOUNTER — HOSPITAL ENCOUNTER (OUTPATIENT)
Facility: HOSPITAL | Age: 74
Discharge: HOME | End: 2024-07-05
Attending: INTERNAL MEDICINE
Payer: MEDICARE

## 2024-07-02 ENCOUNTER — HOSPITAL ENCOUNTER (OUTPATIENT)
Facility: HOSPITAL | Age: 74
Setting detail: RECURRING SERIES
Discharge: HOME | End: 2024-07-05
Payer: MEDICARE

## 2024-07-02 VITALS
DIASTOLIC BLOOD PRESSURE: 69 MMHG | TEMPERATURE: 97.5 F | HEIGHT: 72 IN | HEART RATE: 78 BPM | BODY MASS INDEX: 42.66 KG/M2 | WEIGHT: 315 LBS | OXYGEN SATURATION: 95 % | SYSTOLIC BLOOD PRESSURE: 118 MMHG | RESPIRATION RATE: 16 BRPM

## 2024-07-02 DIAGNOSIS — R60.0 EDEMA OF RIGHT LOWER EXTREMITY: ICD-10-CM

## 2024-07-02 DIAGNOSIS — R60.0 EDEMA OF RIGHT LOWER EXTREMITY: Primary | ICD-10-CM

## 2024-07-02 DIAGNOSIS — L03.119 RECURRENT CELLULITIS OF LOWER LEG: ICD-10-CM

## 2024-07-02 LAB — ECHO BSA: 2.78 M2

## 2024-07-02 PROCEDURE — 99213 OFFICE O/P EST LOW 20 MIN: CPT | Performed by: INTERNAL MEDICINE

## 2024-07-02 PROCEDURE — 1123F ACP DISCUSS/DSCN MKR DOCD: CPT | Performed by: INTERNAL MEDICINE

## 2024-07-02 PROCEDURE — 1036F TOBACCO NON-USER: CPT | Performed by: INTERNAL MEDICINE

## 2024-07-02 PROCEDURE — 93971 EXTREMITY STUDY: CPT

## 2024-07-02 PROCEDURE — G8417 CALC BMI ABV UP PARAM F/U: HCPCS | Performed by: INTERNAL MEDICINE

## 2024-07-02 PROCEDURE — 97803 MED NUTRITION INDIV SUBSEQ: CPT

## 2024-07-02 PROCEDURE — 3078F DIAST BP <80 MM HG: CPT | Performed by: INTERNAL MEDICINE

## 2024-07-02 PROCEDURE — 3074F SYST BP LT 130 MM HG: CPT | Performed by: INTERNAL MEDICINE

## 2024-07-02 PROCEDURE — G8427 DOCREV CUR MEDS BY ELIG CLIN: HCPCS | Performed by: INTERNAL MEDICINE

## 2024-07-02 PROCEDURE — 3017F COLORECTAL CA SCREEN DOC REV: CPT | Performed by: INTERNAL MEDICINE

## 2024-07-02 ASSESSMENT — PATIENT HEALTH QUESTIONNAIRE - PHQ9
SUM OF ALL RESPONSES TO PHQ QUESTIONS 1-9: 0
2. FEELING DOWN, DEPRESSED OR HOPELESS: NOT AT ALL
SUM OF ALL RESPONSES TO PHQ9 QUESTIONS 1 & 2: 0
SUM OF ALL RESPONSES TO PHQ QUESTIONS 1-9: 0
1. LITTLE INTEREST OR PLEASURE IN DOING THINGS: NOT AT ALL

## 2024-07-02 NOTE — PROGRESS NOTES
Regino Matthews is a 73 y.o. male who was seen in clinic today (7/2/2024) for an acute visit.      Assessment & Plan:   Below is the assessment and plan developed based on review of pertinent history, physical exam, labs, studies, and medications.    1. Edema of right lower extremity  Comments:  new dx, differential reviewed,. US obtained and no DVT or obvious etiology. Nothing to suggest cardiac or renal etiology. Will start lasix daily x 5 days  Orders:  -     Vascular duplex lower extremity venous right; Future  2. Recurrent cellulitis of lower leg  Comments:  erythema thought to be due to swelling, but w/ negative US will treat presumptively. If no changes will need imaging of leg/groin  Orders:  -     cephALEXin (KEFLEX) 500 MG capsule; Take 1 capsule by mouth 4 times daily for 7 days, Disp-28 capsule, R-0Normal     Return if symptoms worsen or fail to improve.   Subjective/Objective:   Regino was seen today for Leg Swelling    He was last seen on 6/25 by PCP for hospital follow up.  He was admitted to [facility] for heat exhaustion and RLE cellulitis.  He was discharged on Omnicef and Doxycycline.  At follow up his legs had been looking better and the plan was to continue current treatment, he had 3 days of medications left.    Since that visit he reports significant swelling in the R foot and leg which is new.  There some redness and warmth.  As the day goes on the swelling is worse and improved in the morning.  There is no swelling in the left leg.    He reports feeling something \"pop\" in his right groin last week.  No pain.  No h/o right hip or knee pain.  No fevers or chills.  No CP or palpitations.        Prior to Admission medications    Medication Sig Start Date End Date Taking? Authorizing Provider   valsartan (DIOVAN) 160 MG tablet Take 1 tablet by mouth daily 6/3/24  Yes Jose Dee DO   allopurinol (ZYLOPRIM) 300 MG tablet TAKE 1 TABLET DAILY 3/12/24  Yes Jose Dee DO   verapamil (CALAN SR)

## 2024-07-02 NOTE — TELEPHONE ENCOUNTER
----- Message from Merry Iglesias sent at 7/2/2024  1:09 PM EDT -----  Regarding: ECC Message to Provider  ECC Message to Provider    Relationship to Patient: Spouse/Partner Chanel/ Wife     Additional Information : Patient went to Dr. Cecilio Hess today and the nurse there wanted to call him at home but he is not home right now, instead call him at [phone].  --------------------------------------------------------------------------------------------------------------------------    Call Back Information: OK to leave message on voicemail  Preferred Call Back Number: Phone [phone] (home)

## 2024-07-02 NOTE — PROGRESS NOTES
NUTRITION - FOLLOW-UP TREATMENT NOTE  Patient Name: Regino Matthews         Date: 2024  : 1950    YES Patient  Verified  Diagnosis: E66.01, Z68.45 (ICD-10-CM) - Class 3 severe obesity due to excess calories with serious comorbidity and body mass index (BMI) greater than or equal to 40 in adult (HCC)     In time:   1045am             Out time:   1108am   Total Treatment Time (min):   23     SUBJECTIVE/ASSESSMENT  Current Wt: 332.2 Previous Wt: 328.6 Wt Change: +3.6     Initial Wt: 327.6 Total Wt change: +4.6 Height: 72     Changes in medication or medical history? Any new allergies, surgeries or procedures?    YES/NO    If yes, update Summary List   Pt was hospitalized for heat exhaustion. On Abx to help with infection and cellulitis occurring in loiwer legs.     Right lower leg: Edema (3+ piting edema in foot) present.      Left lower leg: Edema (1+ above the ankle) present.      Nutrition Diagnosis        Diagnosis Status: Obesity R/T excessive caloric intake, inactivity AEB pt 24-hour food recall, BMI >40, pt stated lack of activity and increase in weight since move into apartment.   [x]  Improved []  No Change    []  Declined   []  Discontinued        Nutrition Monitoring and Evaluation: Pt seen today for follow-up visit.  Pt had a challenging month with having a heat stroke and being in hospital for infection with cellulitis and lower extremity edema. Pt has appts today for edema and looking into possible blood clots. Pt has been watching his sodium intake and has been trying to stay consistent with fluid intake.   Pt has tracked some food intake. Pt breakfast is typically 2 pieces of fruit, a greek yogurt and coffee with sugar and milk. Pt lunch has been a salad with blue cheese dressing. Pt recognizes that blue cheese dressing and high calorie option. Pt feels he has not done as well with adding a green vegetable with dinners. Pt is having mixed vegetable but recognizes that it is mostly corn.

## 2024-07-03 RX ORDER — CEPHALEXIN 500 MG/1
500 CAPSULE ORAL 4 TIMES DAILY
Qty: 28 CAPSULE | Refills: 0 | Status: SHIPPED | OUTPATIENT
Start: 2024-07-03 | End: 2024-07-10

## 2024-07-03 NOTE — RESULT ENCOUNTER NOTE
I called patient and reviewed US results, negative for DVT.  See MyChart message from 7/3 regarding plan.  See note from 7/2 regarding plan.

## 2024-07-26 ENCOUNTER — OFFICE VISIT (OUTPATIENT)
Age: 74
End: 2024-07-26
Payer: MEDICARE

## 2024-07-26 ENCOUNTER — TELEPHONE (OUTPATIENT)
Age: 74
End: 2024-07-26

## 2024-07-26 VITALS
TEMPERATURE: 97.3 F | OXYGEN SATURATION: 97 % | HEIGHT: 72 IN | WEIGHT: 315 LBS | BODY MASS INDEX: 42.66 KG/M2 | RESPIRATION RATE: 16 BRPM | DIASTOLIC BLOOD PRESSURE: 58 MMHG | HEART RATE: 68 BPM | SYSTOLIC BLOOD PRESSURE: 102 MMHG

## 2024-07-26 DIAGNOSIS — M79.89 RIGHT LEG SWELLING: Primary | ICD-10-CM

## 2024-07-26 DIAGNOSIS — Z51.81 MEDICATION MONITORING ENCOUNTER: ICD-10-CM

## 2024-07-26 DIAGNOSIS — M79.89 RIGHT LEG SWELLING: ICD-10-CM

## 2024-07-26 LAB
ANION GAP SERPL CALC-SCNC: 7 MMOL/L (ref 5–15)
BUN SERPL-MCNC: 22 MG/DL (ref 6–20)
BUN/CREAT SERPL: 21 (ref 12–20)
CALCIUM SERPL-MCNC: 9.2 MG/DL (ref 8.5–10.1)
CHLORIDE SERPL-SCNC: 105 MMOL/L (ref 97–108)
CO2 SERPL-SCNC: 27 MMOL/L (ref 21–32)
CREAT SERPL-MCNC: 1.07 MG/DL (ref 0.7–1.3)
GLUCOSE SERPL-MCNC: 111 MG/DL (ref 65–100)
POTASSIUM SERPL-SCNC: 4.1 MMOL/L (ref 3.5–5.1)
SODIUM SERPL-SCNC: 139 MMOL/L (ref 136–145)

## 2024-07-26 PROCEDURE — G8417 CALC BMI ABV UP PARAM F/U: HCPCS | Performed by: INTERNAL MEDICINE

## 2024-07-26 PROCEDURE — 3078F DIAST BP <80 MM HG: CPT | Performed by: INTERNAL MEDICINE

## 2024-07-26 PROCEDURE — 3074F SYST BP LT 130 MM HG: CPT | Performed by: INTERNAL MEDICINE

## 2024-07-26 PROCEDURE — 1036F TOBACCO NON-USER: CPT | Performed by: INTERNAL MEDICINE

## 2024-07-26 PROCEDURE — G8427 DOCREV CUR MEDS BY ELIG CLIN: HCPCS | Performed by: INTERNAL MEDICINE

## 2024-07-26 PROCEDURE — 99213 OFFICE O/P EST LOW 20 MIN: CPT | Performed by: INTERNAL MEDICINE

## 2024-07-26 PROCEDURE — 3017F COLORECTAL CA SCREEN DOC REV: CPT | Performed by: INTERNAL MEDICINE

## 2024-07-26 PROCEDURE — 1123F ACP DISCUSS/DSCN MKR DOCD: CPT | Performed by: INTERNAL MEDICINE

## 2024-07-26 ASSESSMENT — ENCOUNTER SYMPTOMS
COUGH: 0
ABDOMINAL PAIN: 0
SHORTNESS OF BREATH: 0
ABDOMINAL DISTENTION: 0

## 2024-07-26 NOTE — PROGRESS NOTES
As needed for gout flare 11/1/23  Yes Jose Dee,    mirabegron (MYRBETRIQ) 25 MG TB24 Take 1 tablet by mouth daily 9/26/19  Yes Automatic Reconciliation, Ar   tamsulosin (FLOMAX) 0.4 MG capsule Take 1 capsule by mouth daily 9/5/19  Yes Automatic Reconciliation, Ar   triamcinolone (KENALOG) 0.1 % cream Apply topically 2 times daily 3/30/20  Yes Automatic Reconciliation, Ar   indomethacin (INDOCIN SR) 75 MG extended release capsule Take 1 capsule by mouth daily as needed  Patient not taking: Reported on 6/25/2024 10/27/21   Automatic Reconciliation, Ar        Review of Systems   Respiratory:  Negative for cough and shortness of breath.    Cardiovascular:  Positive for leg swelling. Negative for chest pain and palpitations.   Gastrointestinal:  Negative for abdominal distention and abdominal pain.        Physical Exam  Musculoskeletal:      Right lower leg: Edema (3+ piting edema in foot, 1+ in shin) present.          Vitals:    07/26/24 1054   BP: (!) 102/58   Pulse: 68   Resp: 16   Temp: 97.3 °F (36.3 °C)   TempSrc: Temporal   SpO2: 97%   Weight: (!) 150 kg (330 lb 9.6 oz)   Height: 1.829 m (6')        Advised him to call back or return to office if symptoms worsen/change/persist.  Discussed expected course/resolution/complications of diagnosis in detail with patient.  Medication risks/benefits/costs/interactions/alternatives discussed with patient.    Deshawn Hernandes MD

## 2024-07-26 NOTE — TELEPHONE ENCOUNTER
----- Message from Rosa Perry sent at 7/26/2024  3:01 PM EDT -----  Regarding: ECC Message to Provider  ECC Message to Provider    Relationship to Patient: Self     Additional Information/  Deshawn Hernandes MD   prepared a referral for a vascular surgery to [facility]   FAX [phone]    Dr. Mihai David   --------------------------------------------------------------------------------------------------------------------------  Call Back Information: OK to leave message on voicemail  Preferred Call Back Number: Phone [phone] (home)

## 2024-07-27 NOTE — RESULT ENCOUNTER NOTE
Results released to patient via [employer].  All labs are stable or at goal for him.  Renal fxn stable taking lasix daily.

## 2024-08-16 RX ORDER — VALSARTAN 160 MG/1
160 TABLET ORAL DAILY
Qty: 90 TABLET | Refills: 3 | Status: SHIPPED | OUTPATIENT
Start: 2024-08-16

## 2024-08-20 ENCOUNTER — OFFICE VISIT (OUTPATIENT)
Age: 74
End: 2024-08-20

## 2024-08-20 VITALS
HEART RATE: 58 BPM | SYSTOLIC BLOOD PRESSURE: 107 MMHG | DIASTOLIC BLOOD PRESSURE: 64 MMHG | TEMPERATURE: 98.5 F | RESPIRATION RATE: 16 BRPM | BODY MASS INDEX: 42.66 KG/M2 | HEIGHT: 72 IN | OXYGEN SATURATION: 95 % | WEIGHT: 315 LBS

## 2024-08-20 DIAGNOSIS — E78.5 HYPERLIPIDEMIA, UNSPECIFIED HYPERLIPIDEMIA TYPE: ICD-10-CM

## 2024-08-20 DIAGNOSIS — R01.1 HEART MURMUR: ICD-10-CM

## 2024-08-20 DIAGNOSIS — Z00.00 MEDICARE ANNUAL WELLNESS VISIT, SUBSEQUENT: Primary | ICD-10-CM

## 2024-08-20 DIAGNOSIS — R41.3 MEMORY IMPAIRMENT: ICD-10-CM

## 2024-08-20 DIAGNOSIS — L03.119 RECURRENT CELLULITIS OF LOWER LEG: ICD-10-CM

## 2024-08-20 DIAGNOSIS — I10 PRIMARY HYPERTENSION: ICD-10-CM

## 2024-08-20 DIAGNOSIS — R60.0 EDEMA OF RIGHT LOWER EXTREMITY: ICD-10-CM

## 2024-08-20 LAB
ALBUMIN SERPL-MCNC: 4 G/DL (ref 3.5–5)
ALBUMIN/GLOB SERPL: 1.2 (ref 1.1–2.2)
ALP SERPL-CCNC: 97 U/L (ref 45–117)
ALT SERPL-CCNC: 29 U/L (ref 12–78)
ANION GAP SERPL CALC-SCNC: 6 MMOL/L (ref 5–15)
AST SERPL-CCNC: 25 U/L (ref 15–37)
BILIRUB SERPL-MCNC: 0.8 MG/DL (ref 0.2–1)
BUN SERPL-MCNC: 13 MG/DL (ref 6–20)
BUN/CREAT SERPL: 11 (ref 12–20)
CALCIUM SERPL-MCNC: 9.1 MG/DL (ref 8.5–10.1)
CHLORIDE SERPL-SCNC: 103 MMOL/L (ref 97–108)
CHOLEST SERPL-MCNC: 128 MG/DL
CO2 SERPL-SCNC: 29 MMOL/L (ref 21–32)
COMMENT:: NORMAL
CREAT SERPL-MCNC: 1.14 MG/DL (ref 0.7–1.3)
ERYTHROCYTE [DISTWIDTH] IN BLOOD BY AUTOMATED COUNT: 13 % (ref 11.5–14.5)
GLOBULIN SER CALC-MCNC: 3.4 G/DL (ref 2–4)
GLUCOSE SERPL-MCNC: 105 MG/DL (ref 65–100)
HCT VFR BLD AUTO: 40.5 % (ref 36.6–50.3)
HDLC SERPL-MCNC: 51 MG/DL
HDLC SERPL: 2.5 (ref 0–5)
HGB BLD-MCNC: 13.6 G/DL (ref 12.1–17)
LDLC SERPL CALC-MCNC: 58 MG/DL (ref 0–100)
MCH RBC QN AUTO: 32.8 PG (ref 26–34)
MCHC RBC AUTO-ENTMCNC: 33.6 G/DL (ref 30–36.5)
MCV RBC AUTO: 97.6 FL (ref 80–99)
NRBC # BLD: 0 K/UL (ref 0–0.01)
NRBC BLD-RTO: 0 PER 100 WBC
PLATELET # BLD AUTO: 219 K/UL (ref 150–400)
PMV BLD AUTO: 10.3 FL (ref 8.9–12.9)
POTASSIUM SERPL-SCNC: 4.3 MMOL/L (ref 3.5–5.1)
PROT SERPL-MCNC: 7.4 G/DL (ref 6.4–8.2)
RBC # BLD AUTO: 4.15 M/UL (ref 4.1–5.7)
SODIUM SERPL-SCNC: 138 MMOL/L (ref 136–145)
SPECIMEN HOLD: NORMAL
T4 FREE SERPL-MCNC: 1.3 NG/DL (ref 0.8–1.5)
TRIGL SERPL-MCNC: 95 MG/DL
TSH SERPL DL<=0.05 MIU/L-ACNC: 3.01 UIU/ML (ref 0.36–3.74)
VIT B12 SERPL-MCNC: 468 PG/ML (ref 193–986)
VLDLC SERPL CALC-MCNC: 19 MG/DL
WBC # BLD AUTO: 6.6 K/UL (ref 4.1–11.1)

## 2024-08-20 RX ORDER — MAGNESIUM 30 MG
30 TABLET ORAL DAILY
COMMUNITY

## 2024-08-20 SDOH — ECONOMIC STABILITY: FOOD INSECURITY: WITHIN THE PAST 12 MONTHS, THE FOOD YOU BOUGHT JUST DIDN'T LAST AND YOU DIDN'T HAVE MONEY TO GET MORE.: NEVER TRUE

## 2024-08-20 SDOH — ECONOMIC STABILITY: FOOD INSECURITY: WITHIN THE PAST 12 MONTHS, YOU WORRIED THAT YOUR FOOD WOULD RUN OUT BEFORE YOU GOT MONEY TO BUY MORE.: NEVER TRUE

## 2024-08-20 SDOH — ECONOMIC STABILITY: INCOME INSECURITY: HOW HARD IS IT FOR YOU TO PAY FOR THE VERY BASICS LIKE FOOD, HOUSING, MEDICAL CARE, AND HEATING?: NOT HARD AT ALL

## 2024-08-20 ASSESSMENT — LIFESTYLE VARIABLES
HOW OFTEN DO YOU HAVE A DRINK CONTAINING ALCOHOL: MONTHLY OR LESS
HOW MANY STANDARD DRINKS CONTAINING ALCOHOL DO YOU HAVE ON A TYPICAL DAY: 1 OR 2

## 2024-08-20 ASSESSMENT — PATIENT HEALTH QUESTIONNAIRE - PHQ9
SUM OF ALL RESPONSES TO PHQ9 QUESTIONS 1 & 2: 1
SUM OF ALL RESPONSES TO PHQ QUESTIONS 1-9: 1
SUM OF ALL RESPONSES TO PHQ QUESTIONS 1-9: 1
2. FEELING DOWN, DEPRESSED OR HOPELESS: SEVERAL DAYS
SUM OF ALL RESPONSES TO PHQ QUESTIONS 1-9: 1
1. LITTLE INTEREST OR PLEASURE IN DOING THINGS: NOT AT ALL
DEPRESSION UNABLE TO ASSESS: URGENT/EMERGENT SITUATION
SUM OF ALL RESPONSES TO PHQ QUESTIONS 1-9: 1

## 2024-08-20 NOTE — PROGRESS NOTES
and Rhythm: Normal rate and regular rhythm.      Heart sounds: Murmur heard.      Systolic murmur is present with a grade of 3/6.   Pulmonary:      Effort: Pulmonary effort is normal.      Breath sounds: Normal breath sounds. No wheezing or rales.   Neurological:      Mental Status: He is alert.               No Known Allergies  Prior to Visit Medications    Medication Sig Taking? Authorizing Provider   Calcium Carb-Cholecalciferol (CALCIUM + D3 PO) Take 1 tablet by mouth daily Yes ProviderEmir MD   magnesium 30 MG tablet Take 1 tablet by mouth daily Yes ProviderEmir MD   Elastic Bandages & Supports (MEDICAL COMPRESSION STOCKINGS) MISC 3 each by Does not apply route daily 15-20 mmHg strength Yes Jose Dee DO   valsartan (DIOVAN) 160 MG tablet TAKE 1 TABLET DAILY Yes Jose Dee DO   allopurinol (ZYLOPRIM) 300 MG tablet TAKE 1 TABLET DAILY Yes Jose Dee DO   verapamil (CALAN SR) 240 MG extended release tablet TAKE 1 TABLET TWICE A DAY Yes Jose Dee DO   atorvastatin (LIPITOR) 10 MG tablet TAKE 1 TABLET DAILY Yes Jose Dee DO   ketoconazole (NIZORAL) 2 % cream  Yes Emir Chavez MD   mirabegron (MYRBETRIQ) 25 MG TB24 Take 1 tablet by mouth daily Yes Automatic Reconciliation, Ar   tamsulosin (FLOMAX) 0.4 MG capsule Take 1 capsule by mouth daily Yes Automatic Reconciliation, Ar   indomethacin (INDOCIN SR) 75 MG extended release capsule Take 1 capsule by mouth daily as needed  Patient not taking: Reported on 6/25/2024  Automatic Reconciliation, Ar       CareTeam (Including outside providers/suppliers regularly involved in providing care):   Patient Care Team:  Jose Dee DO as PCP - General (Internal Medicine)  Jose Dee DO as PCP - Empaneled Provider      Reviewed and updated this visit:  Tobacco  Allergies  Meds  Med Hx  Surg Hx  Soc Hx  Fam Hx

## 2024-08-20 NOTE — PATIENT INSTRUCTIONS
Learning About Mild Cognitive Impairment (MCI)  What is mild cognitive impairment (MCI)?     It's common to forget things sometimes as we get older. But some older people have memory loss that's more than normal aging. It's called mild cognitive impairment, or MCI. It is not the same as dementia.  People with the condition often know that their memory or mental function has changed. Tests may show some loss. But their minds work well overall. They can carry out daily tasks that are normal for them.  People with MCI have a higher chance of one day getting dementia. But not all people who have it will get dementia. Some people may stay the same over time.  What are the symptoms?  People with MCI have more memory loss than what occurs with normal aging. They may have increasing trouble with recalling words and keeping up with conversations. They may also have trouble remembering important events and making decisions.  What puts you at risk?  The risk of getting MCI increases with age. Having high blood pressure or having a family history of MCI may also increase your risk.  How is it diagnosed?  Your doctor will do a physical exam.  You may be asked questions to check your memory and other mental skills. Your doctor may also talk to close friends and family members. This can help the doctor figure out how your memory and other mental skills have changed.  You may get blood tests and tests that look at your brain.  These questions and tests can make sure you don't have other conditions that can cause symptoms like MCI. These include depression, sleep problems, and side effects from medicines.  How is it treated?  There are no medicines to treat MCI or to keep it from progressing to dementia. But treating conditions like high blood pressure and diabetes may help. A person with MCI needs routine follow-up visits with their doctor to check on changes in the person's mental skills.  How can you care for yourself at

## 2024-09-06 ENCOUNTER — HOSPITAL ENCOUNTER (OUTPATIENT)
Facility: HOSPITAL | Age: 74
Discharge: HOME | End: 2024-09-08
Attending: STUDENT IN AN ORGANIZED HEALTH CARE EDUCATION/TRAINING PROGRAM
Payer: MEDICARE

## 2024-09-06 VITALS
WEIGHT: 315 LBS | DIASTOLIC BLOOD PRESSURE: 63 MMHG | SYSTOLIC BLOOD PRESSURE: 140 MMHG | BODY MASS INDEX: 42.66 KG/M2 | HEIGHT: 72 IN

## 2024-09-06 DIAGNOSIS — R01.1 HEART MURMUR: ICD-10-CM

## 2024-09-06 LAB
ECHO AO ASC DIAM: 3.9 CM
ECHO AO ASCENDING AORTA INDEX: 1.48 CM/M2
ECHO AO ROOT DIAM: 4.2 CM
ECHO AO ROOT INDEX: 1.59 CM/M2
ECHO AR MAX VEL PISA: 3.9 M/S
ECHO AV AREA PEAK VELOCITY: 1.8 CM2
ECHO AV AREA VTI: 1.8 CM2
ECHO AV AREA/BSA PEAK VELOCITY: 0.7 CM2/M2
ECHO AV AREA/BSA VTI: 0.7 CM2/M2
ECHO AV MEAN GRADIENT: 18 MMHG
ECHO AV MEAN VELOCITY: 2 M/S
ECHO AV PEAK GRADIENT: 32 MMHG
ECHO AV PEAK VELOCITY: 2.8 M/S
ECHO AV REGURGITANT PHT: 646.3 MILLISECOND
ECHO AV VELOCITY RATIO: 0.32
ECHO AV VTI: 67.6 CM
ECHO BSA: 2.77 M2
ECHO LA DIAMETER INDEX: 1.4 CM/M2
ECHO LA DIAMETER: 3.7 CM
ECHO LA TO AORTIC ROOT RATIO: 0.88
ECHO LA VOL A-L A2C: 38 ML (ref 18–58)
ECHO LA VOL A-L A4C: 31 ML (ref 18–58)
ECHO LA VOL BP: 34 ML (ref 18–58)
ECHO LA VOL MOD A2C: 36 ML (ref 18–58)
ECHO LA VOL MOD A4C: 30 ML (ref 18–58)
ECHO LA VOL/BSA BIPLANE: 13 ML/M2 (ref 16–34)
ECHO LA VOLUME AREA LENGTH: 36 ML
ECHO LA VOLUME INDEX A-L A2C: 14 ML/M2 (ref 16–34)
ECHO LA VOLUME INDEX A-L A4C: 12 ML/M2 (ref 16–34)
ECHO LA VOLUME INDEX AREA LENGTH: 14 ML/M2 (ref 16–34)
ECHO LA VOLUME INDEX MOD A2C: 14 ML/M2 (ref 16–34)
ECHO LA VOLUME INDEX MOD A4C: 11 ML/M2 (ref 16–34)
ECHO LV E' LATERAL VELOCITY: 8 CM/S
ECHO LV E' SEPTAL VELOCITY: 9 CM/S
ECHO LV EDV A2C: 113 ML
ECHO LV EDV A4C: 127 ML
ECHO LV EDV BP: 121 ML (ref 67–155)
ECHO LV EDV INDEX A4C: 48 ML/M2
ECHO LV EDV INDEX BP: 46 ML/M2
ECHO LV EDV NDEX A2C: 43 ML/M2
ECHO LV EJECTION FRACTION A2C: 53 %
ECHO LV EJECTION FRACTION A4C: 54 %
ECHO LV EJECTION FRACTION BIPLANE: 52 % (ref 55–100)
ECHO LV ESV A2C: 53 ML
ECHO LV ESV A4C: 59 ML
ECHO LV ESV BP: 58 ML (ref 22–58)
ECHO LV ESV INDEX A2C: 20 ML/M2
ECHO LV ESV INDEX A4C: 22 ML/M2
ECHO LV ESV INDEX BP: 22 ML/M2
ECHO LV FRACTIONAL SHORTENING: 28 % (ref 28–44)
ECHO LV INTERNAL DIMENSION DIASTOLE INDEX: 2.16 CM/M2
ECHO LV INTERNAL DIMENSION DIASTOLIC: 5.7 CM (ref 4.2–5.9)
ECHO LV INTERNAL DIMENSION SYSTOLIC INDEX: 1.55 CM/M2
ECHO LV INTERNAL DIMENSION SYSTOLIC: 4.1 CM
ECHO LV IVSD: 0.8 CM (ref 0.6–1)
ECHO LV MASS 2D: 170.2 G (ref 88–224)
ECHO LV MASS INDEX 2D: 64.5 G/M2 (ref 49–115)
ECHO LV POSTERIOR WALL DIASTOLIC: 0.8 CM (ref 0.6–1)
ECHO LV RELATIVE WALL THICKNESS RATIO: 0.28
ECHO LVOT AREA: 5.7 CM2
ECHO LVOT AV VTI INDEX: 0.31
ECHO LVOT DIAM: 2.7 CM
ECHO LVOT MEAN GRADIENT: 2 MMHG
ECHO LVOT PEAK GRADIENT: 3 MMHG
ECHO LVOT PEAK VELOCITY: 0.9 M/S
ECHO LVOT STROKE VOLUME INDEX: 45.7 ML/M2
ECHO LVOT SV: 120.7 ML
ECHO LVOT VTI: 21.1 CM
ECHO MV A VELOCITY: 0.58 M/S
ECHO MV E DECELERATION TIME (DT): 253.5 MS
ECHO MV E VELOCITY: 0.85 M/S
ECHO MV E/A RATIO: 1.47
ECHO MV E/E' LATERAL: 10.63
ECHO MV E/E' RATIO (AVERAGED): 10.03
ECHO MV E/E' SEPTAL: 9.44
ECHO MV REGURGITANT PEAK GRADIENT: 104 MMHG
ECHO MV REGURGITANT PEAK VELOCITY: 5.1 M/S
ECHO PV MAX VELOCITY: 1.2 M/S
ECHO PV PEAK GRADIENT: 5 MMHG
ECHO RV FREE WALL PEAK S': 14 CM/S
ECHO RV INTERNAL DIMENSION: 5.4 CM
ECHO RV TAPSE: 1.9 CM (ref 1.7–?)
ECHO TV REGURGITANT MAX VELOCITY: 2.21 M/S
ECHO TV REGURGITANT PEAK GRADIENT: 20 MMHG

## 2024-09-06 PROCEDURE — 93306 TTE W/DOPPLER COMPLETE: CPT | Performed by: STUDENT IN AN ORGANIZED HEALTH CARE EDUCATION/TRAINING PROGRAM

## 2024-09-06 PROCEDURE — 93306 TTE W/DOPPLER COMPLETE: CPT

## 2024-10-21 NOTE — PROGRESS NOTES
NUTRITION - FOLLOW-UP TREATMENT NOTE  Patient Name: Regino Matthews         Date: 10/22/2024  : 1950    YES Patient  Verified  Diagnosis: E66.01, Z68.45 (ICD-10-CM) - Class 3 severe obesity due to excess calories with serious comorbidity and body mass index (BMI) greater than or equal to 40 in adult (HCC)     In time:   1035am             Out time:   1105am   Total Treatment Time (min):   30     SUBJECTIVE/ASSESSMENT  Current Wt: 318.4 Previous Wt: 332.2 Wt Change: -13.8     Initial Wt: 327.6 Total Wt change: -9.2 Height: 72     Changes in medication or medical history? Any new allergies, surgeries or procedures?    YES/NO    If yes, update Summary List   Pt has gotten compression garments and this has improved the swelling in his legs significantly      Nutrition Diagnosis        Diagnosis Status: Obesity R/T excessive caloric intake, inactivity AEB pt 24-hour food recall, BMI >40, pt stated lack of activity and increase in weight since move into apartment.   [x]  Improved []  No Change    []  Declined   []  Discontinued        Nutrition Monitoring and Evaluation: Pt seen today for follow-up visit.  Pt has done very well in the past 3 months to focus in on food choices. Pt has been largely sticking with the same breakfast and rotating lunch and dinner options in for the day. Pt is getting between 3869-1217 calories when he is paying attention to food choices. Pt noted times where he gets off track and makes poor food decisions. Pt noted that he can see that he needs to improve fiber intake. His daughter has a new artichoke recipe that he enjoyed.   Pt is trying to walk more and this has helped to keep swelling down in his legs. Pt is worried about Thanksgiving as this will probably be a challenging food day. We discussed portion sizes. We also discussed importance of not bringing home any food temptations with the holidays coming up.     Previous Goals:   - Watch sodium intake, limit to 2000mg/day   -

## 2024-10-22 ENCOUNTER — HOSPITAL ENCOUNTER (OUTPATIENT)
Facility: HOSPITAL | Age: 74
Setting detail: RECURRING SERIES
Discharge: HOME | End: 2024-10-25

## 2024-12-09 ENCOUNTER — OFFICE VISIT (OUTPATIENT)
Age: 74
End: 2024-12-09
Payer: MEDICARE

## 2024-12-09 VITALS
HEIGHT: 72 IN | DIASTOLIC BLOOD PRESSURE: 75 MMHG | TEMPERATURE: 98.8 F | WEIGHT: 315 LBS | BODY MASS INDEX: 42.66 KG/M2 | RESPIRATION RATE: 16 BRPM | SYSTOLIC BLOOD PRESSURE: 119 MMHG | HEART RATE: 70 BPM | OXYGEN SATURATION: 94 %

## 2024-12-09 DIAGNOSIS — E66.813 CLASS 3 SEVERE OBESITY DUE TO EXCESS CALORIES WITH SERIOUS COMORBIDITY AND BODY MASS INDEX (BMI) OF 45.0 TO 49.9 IN ADULT: Primary | ICD-10-CM

## 2024-12-09 DIAGNOSIS — R41.3 MEMORY IMPAIRMENT: ICD-10-CM

## 2024-12-09 DIAGNOSIS — I10 HYPERTENSION, UNSPECIFIED TYPE: ICD-10-CM

## 2024-12-09 DIAGNOSIS — E66.01 CLASS 3 SEVERE OBESITY DUE TO EXCESS CALORIES WITH SERIOUS COMORBIDITY AND BODY MASS INDEX (BMI) OF 45.0 TO 49.9 IN ADULT: Primary | ICD-10-CM

## 2024-12-09 DIAGNOSIS — R60.0 EDEMA OF RIGHT LOWER EXTREMITY: ICD-10-CM

## 2024-12-09 PROCEDURE — 3017F COLORECTAL CA SCREEN DOC REV: CPT | Performed by: STUDENT IN AN ORGANIZED HEALTH CARE EDUCATION/TRAINING PROGRAM

## 2024-12-09 PROCEDURE — 1036F TOBACCO NON-USER: CPT | Performed by: STUDENT IN AN ORGANIZED HEALTH CARE EDUCATION/TRAINING PROGRAM

## 2024-12-09 PROCEDURE — 99214 OFFICE O/P EST MOD 30 MIN: CPT | Performed by: STUDENT IN AN ORGANIZED HEALTH CARE EDUCATION/TRAINING PROGRAM

## 2024-12-09 PROCEDURE — G8417 CALC BMI ABV UP PARAM F/U: HCPCS | Performed by: STUDENT IN AN ORGANIZED HEALTH CARE EDUCATION/TRAINING PROGRAM

## 2024-12-09 PROCEDURE — 3078F DIAST BP <80 MM HG: CPT | Performed by: STUDENT IN AN ORGANIZED HEALTH CARE EDUCATION/TRAINING PROGRAM

## 2024-12-09 PROCEDURE — 1159F MED LIST DOCD IN RCRD: CPT | Performed by: STUDENT IN AN ORGANIZED HEALTH CARE EDUCATION/TRAINING PROGRAM

## 2024-12-09 PROCEDURE — 3074F SYST BP LT 130 MM HG: CPT | Performed by: STUDENT IN AN ORGANIZED HEALTH CARE EDUCATION/TRAINING PROGRAM

## 2024-12-09 PROCEDURE — G8484 FLU IMMUNIZE NO ADMIN: HCPCS | Performed by: STUDENT IN AN ORGANIZED HEALTH CARE EDUCATION/TRAINING PROGRAM

## 2024-12-09 PROCEDURE — 1123F ACP DISCUSS/DSCN MKR DOCD: CPT | Performed by: STUDENT IN AN ORGANIZED HEALTH CARE EDUCATION/TRAINING PROGRAM

## 2024-12-09 PROCEDURE — 1126F AMNT PAIN NOTED NONE PRSNT: CPT | Performed by: STUDENT IN AN ORGANIZED HEALTH CARE EDUCATION/TRAINING PROGRAM

## 2024-12-09 PROCEDURE — G8427 DOCREV CUR MEDS BY ELIG CLIN: HCPCS | Performed by: STUDENT IN AN ORGANIZED HEALTH CARE EDUCATION/TRAINING PROGRAM

## 2024-12-09 RX ORDER — NALTREXONE HYDROCHLORIDE AND BUPROPION HYDROCHLORIDE 8; 90 MG/1; MG/1
2 TABLET, EXTENDED RELEASE ORAL 2 TIMES DAILY
COMMUNITY

## 2024-12-09 NOTE — PROGRESS NOTES
Regino Matthews is a 74 y.o. year old male who presents today (12/09/24) for follow-up/ Hypertension      Assessment & Plan:     Assessment & Plan  Obesity  Weight decreased from 335 lbs in June 2024 to 317 lbs today.  Continue Contrave, 2 pills twice daily.    Venous insufficiency  Continue low salt diet, advised to wear compression stockings and elevate feet. Prescription provided.    Hypertension  Blood pressure well-controlled at 119/75. Continue current medications    Memory impairment  Encouraged to keep evaluation scheduled with neuropsych for further evaluation    Follow-up: Return in 6 months.    Subjective:     History of Present Illness  The patient presents for evaluation of multiple medical concerns.    Obesity  He is taking Contrave, 2 pills twice daily, which suppresses his appetite. He identified a lack of fiber in his diet and uses wheat checks as a substitute. He has a dietitian appointment in 01/2025, having previously met in 10/2024. He feels that this has been helpful for him    Leg Swelling  He experiences intermittent leg swelling, slightly elevated this morning, and requests a prescription for compression stockings.    HTN  He monitors his blood pressure at home, noting it rises after salty foods and feels dizzy when standing quickly in the morning. Has not had any low BP readings at home    Memory issues  Has upcoming appointment with neuropsychology for further evaluation      Recent specialist visits:  None       Review of Systems: ROS completed and negative except as noted in HPI    PMHx:    Patient Active Problem List   Diagnosis    Class 3 severe obesity due to excess calories with serious comorbidity and body mass index (BMI) of 45.0 to 49.9 in adult    Melanoma (HCC)    Hx of adenomatous colonic polyps    Multinodular goiter    Hypertension    Hyperlipidemia    Gout    Dizzy    Pigmented purpura (HCC)       Prior to Admission medications    Medication Sig Start Date End Date Taking?

## 2024-12-09 NOTE — PROGRESS NOTES
Verified name and birth date for privacy precautions.   Chart reviewed in preparation for today's visit.     Chief Complaint   Patient presents with    Hypertension          Health Maintenance Due   Topic    Respiratory Syncytial Virus (RSV) Pregnant or age 60 yrs+ (1 - Risk 60-74 years 1-dose series)    Colorectal Cancer Screen     DTaP/Tdap/Td vaccine (2 - Td or Tdap)         Wt Readings from Last 3 Encounters:   12/09/24 (!) 143.8 kg (317 lb)   09/06/24 (!) 151 kg (333 lb)   08/20/24 (!) 151 kg (333 lb)     Temp Readings from Last 3 Encounters:   12/09/24 98.8 °F (37.1 °C)   08/20/24 98.5 °F (36.9 °C) (Oral)   07/26/24 97.3 °F (36.3 °C) (Temporal)     BP Readings from Last 3 Encounters:   12/09/24 119/75   09/06/24 (!) 140/63   08/20/24 107/64     Pulse Readings from Last 3 Encounters:   12/09/24 70   08/20/24 58   07/26/24 68       Social Determinants of Health     Tobacco Use: Low Risk  (12/9/2024)    Patient History     Smoking Tobacco Use: Never     Smokeless Tobacco Use: Never     Passive Exposure: Not on file   Alcohol Use: Not At Risk (8/20/2024)    AUDIT-C     Frequency of Alcohol Consumption: Monthly or less     Average Number of Drinks: 1 or 2     Frequency of Binge Drinking: Never   Financial Resource Strain: Low Risk  (8/20/2024)    Overall Financial Resource Strain (CARDIA)     Difficulty of Paying Living Expenses: Not hard at all   Food Insecurity: No Food Insecurity (8/20/2024)    Hunger Vital Sign     Worried About Running Out of Food in the Last Year: Never true     Ran Out of Food in the Last Year: Never true   Transportation Needs: Unknown (8/20/2024)    PRAPARE - Transportation     Lack of Transportation (Medical): Not on file     Lack of Transportation (Non-Medical): No   Physical Activity: Insufficiently Active (8/20/2024)    Exercise Vital Sign     Days of Exercise per Week: 2 days     Minutes of Exercise per Session: 30 min   Stress: Not on file   Social Connections: Not on file   Intimate

## 2025-01-06 RX ORDER — ATORVASTATIN CALCIUM 10 MG/1
10 TABLET, FILM COATED ORAL DAILY
Qty: 90 TABLET | Refills: 3 | Status: SHIPPED | OUTPATIENT
Start: 2025-01-06

## 2025-01-06 RX ORDER — ALLOPURINOL 300 MG/1
300 TABLET ORAL DAILY
Qty: 90 TABLET | Refills: 3 | Status: SHIPPED | OUTPATIENT
Start: 2025-01-06

## 2025-01-06 RX ORDER — VERAPAMIL HYDROCHLORIDE 240 MG/1
240 TABLET, FILM COATED, EXTENDED RELEASE ORAL 2 TIMES DAILY
Qty: 180 TABLET | Refills: 3 | Status: SHIPPED | OUTPATIENT
Start: 2025-01-06

## 2025-01-06 NOTE — TELEPHONE ENCOUNTER
Last office visit 12/09/24  NEXT APPT  With Internal Medicine (Jose Dee DO)  06/10/2025 at 10:30 AM    Last fill on verapamil 3/12/24 180 with 3 additional refills     Last eun on atorvastain 3/12/24 #90 with 3 additional refills

## 2025-01-31 ENCOUNTER — PATIENT MESSAGE (OUTPATIENT)
Age: 75
End: 2025-01-31

## 2025-01-31 DIAGNOSIS — E66.813 CLASS 3 SEVERE OBESITY DUE TO EXCESS CALORIES WITH SERIOUS COMORBIDITY AND BODY MASS INDEX (BMI) OF 45.0 TO 49.9 IN ADULT: Primary | ICD-10-CM

## 2025-01-31 DIAGNOSIS — E66.01 CLASS 3 SEVERE OBESITY DUE TO EXCESS CALORIES WITH SERIOUS COMORBIDITY AND BODY MASS INDEX (BMI) OF 45.0 TO 49.9 IN ADULT: Primary | ICD-10-CM

## 2025-01-31 RX ORDER — NALTREXONE HYDROCHLORIDE AND BUPROPION HYDROCHLORIDE 8; 90 MG/1; MG/1
2 TABLET, EXTENDED RELEASE ORAL 2 TIMES DAILY
Qty: 360 TABLET | Refills: 3 | Status: SHIPPED | OUTPATIENT
Start: 2025-01-31 | End: 2026-01-26

## 2025-03-04 ENCOUNTER — OFFICE VISIT (OUTPATIENT)
Age: 75
End: 2025-03-04
Payer: MEDICARE

## 2025-03-04 DIAGNOSIS — R41.3 MEMORY CHANGE: Primary | ICD-10-CM

## 2025-03-04 DIAGNOSIS — R41.844 IMPAIRED EXECUTIVE FUNCTIONING: ICD-10-CM

## 2025-03-04 PROCEDURE — 90791 PSYCH DIAGNOSTIC EVALUATION: CPT | Performed by: STUDENT IN AN ORGANIZED HEALTH CARE EDUCATION/TRAINING PROGRAM

## 2025-03-04 PROCEDURE — 1036F TOBACCO NON-USER: CPT | Performed by: STUDENT IN AN ORGANIZED HEALTH CARE EDUCATION/TRAINING PROGRAM

## 2025-03-04 PROCEDURE — 1123F ACP DISCUSS/DSCN MKR DOCD: CPT | Performed by: STUDENT IN AN ORGANIZED HEALTH CARE EDUCATION/TRAINING PROGRAM

## 2025-03-04 NOTE — PROGRESS NOTES
is a history of a hospitalization back in June of 2024 for acute encephalopathy secondary to heatstroke and RLE cellulitis, resulting in septic shock. This was accompanied by acute changes to his mental status with notable confusion; medication, however, improved his functioning and he returned to baseline. Nonetheless, in context of concerns about more longer standing memory and executive difficulties, we will evaluate further to rule out normal aging vs neurodegenerative factors. At this time, Mr. Matthews appears to be a good historian and is aware of his perceived difficulties. He did appear to demonstrate his out-of-context conversation, suggesting evidence of executive function change. At most, he presents with MCI but further evaluation will clarify this. Comprehensive evaluation will assist in obtaining a quantitative assessment of his cognitive and emotional functioning in order to guide differential diagnosis, treatment planning, and recommendations.     ASSESSMENT:   1. Memory change    2. Impaired executive functioning      PLAN:  Complete neuropsychological testing to obtain a quantitative assessment of his cognitive and emotional functioning.   Comprehensive feedback regarding the results of this neuropsychological evaluation will be provided after completion of neuropsychological testing.     DOCUMENTATION OF SERVICE ACTIVITIES TIME SPENT   Diagnostic Interview Examination 90791 x 1 unit   Clinical interview with patient  Interview with collateral informant (if applicable) 41 minutes       Ruby Khalil PsyD, SUE  Clinical Neuropsychologist  [facility] at [facility]

## 2025-03-04 NOTE — PATIENT INSTRUCTIONS
Thank you for choosing us for your neuropsychological evaluation. We will examine your overall cognitive functioning, including areas such as memory, attention, concentration, language, and problem solving.   You were seen for an initial visit by Dr. Ruby Khalil (Neuropsychologist) today. Please refer to the dates listed under \"What's Next\" for your scheduled Procedure (testing) and Follow-Up appointments.    Reminders for your testing appointment:   If you wear glasses/contacts and/or hearing assistive devices, please be sure to bring them with you to your testing appointment.   You will be working with my Psychometrist, Michelle, on the day of testing. She is specially trained to administer these cognitive tests to you.   Please prepare accordingly, as the duration of testing may take a few hours to complete.     To reschedule or cancel your appointment, please call [phone].   In case of an emergency, call [phone] or report to the nearest emergency department.  It has been our pleasure to work with you, and we look forward to speaking with you soon.

## 2025-03-10 ENCOUNTER — PROCEDURE VISIT (OUTPATIENT)
Age: 75
End: 2025-03-10
Payer: MEDICARE

## 2025-03-10 DIAGNOSIS — F32.A ANXIETY AND DEPRESSION: ICD-10-CM

## 2025-03-10 DIAGNOSIS — G31.84 MILD COGNITIVE IMPAIRMENT OF UNCERTAIN OR UNKNOWN ETIOLOGY: Primary | ICD-10-CM

## 2025-03-10 DIAGNOSIS — F41.9 ANXIETY AND DEPRESSION: ICD-10-CM

## 2025-03-10 PROCEDURE — 96139 PSYCL/NRPSYC TST TECH EA: CPT | Performed by: STUDENT IN AN ORGANIZED HEALTH CARE EDUCATION/TRAINING PROGRAM

## 2025-03-10 PROCEDURE — 96133 NRPSYC TST EVAL PHYS/QHP EA: CPT | Performed by: STUDENT IN AN ORGANIZED HEALTH CARE EDUCATION/TRAINING PROGRAM

## 2025-03-10 PROCEDURE — 96138 PSYCL/NRPSYC TECH 1ST: CPT | Performed by: STUDENT IN AN ORGANIZED HEALTH CARE EDUCATION/TRAINING PROGRAM

## 2025-03-10 PROCEDURE — 96132 NRPSYC TST EVAL PHYS/QHP 1ST: CPT | Performed by: STUDENT IN AN ORGANIZED HEALTH CARE EDUCATION/TRAINING PROGRAM

## 2025-03-21 ENCOUNTER — OFFICE VISIT (OUTPATIENT)
Age: 75
End: 2025-03-21
Payer: MEDICARE

## 2025-03-21 DIAGNOSIS — F41.9 ANXIETY AND DEPRESSION: ICD-10-CM

## 2025-03-21 DIAGNOSIS — F32.A ANXIETY AND DEPRESSION: ICD-10-CM

## 2025-03-21 DIAGNOSIS — G31.84 MILD COGNITIVE IMPAIRMENT OF UNCERTAIN OR UNKNOWN ETIOLOGY: Primary | ICD-10-CM

## 2025-03-21 PROCEDURE — 96132 NRPSYC TST EVAL PHYS/QHP 1ST: CPT | Performed by: STUDENT IN AN ORGANIZED HEALTH CARE EDUCATION/TRAINING PROGRAM

## 2025-03-21 NOTE — PROGRESS NOTES
dementia in a paternal grandmother and paternal aunt. Other relevant family medical history includes diabetes, heart disease, and hypertension.      SOCIAL HISTORY:   Mr. Matthews is originally from [place]. He reported to longstanding difficulties with reading that were not readily identified until he reached college. He noted that he encodes information better when he listens to it verbally versus when he reads it. As such, he listens to audio books. Mr. Matthews obtained his bachelor of science degree in electrical engineering and was an . He also worked as a . Mr. Matthews retired in 2010. He described an adequate social support system.     BEHAVIORAL OBSERVATIONS     Arrival: On time and accompanied by his wife.   General appearance: Appropriately dressed and well-groomed.   Level of consciousness: Alert, attentive.   Ambulation/Gait: Ambulated independently; gait was unremarkable.   Motor: WNL.   Sensory: Vision and hearing were adequate for the purposes of the evaluation.  Speech: Normal for rate, tone, and prosody. No apparent word finding difficulties.   Language comprehension: Intact.   Thought processes/Insight: Disorganized thought processes at times; tended to share information without initial full context of what he was discussing. However, very good historian and good insight into his perceived difficulties.   Mood and Affect: Euthymic mood; normal range of affect.   Current suicidality/homicidality: Did not endorse.   Rapport: Adequately established.   Test Observations: Normal range of emotional expression. Recognized and coped adequately with his perceived difficulties. No unusual response style or need for cueing/re-instruction. Overall, he was consistently alert and attentive to task, motivated for testing, cooperative, and appeared to be engaged in the testing process.     TESTING     TESTS ADMINISTERED:   Select performance validity tests; [employer]

## 2025-03-21 NOTE — PROGRESS NOTES
NEUROPSYCHOLOGICAL EVALUATION   Feedback     Prior to seeing the patient for today's visit, I reviewed pertinent records, including the previously completed report, the records in Epic, and any updated visits from other providers since the patient's last visit. Mr. Regino Matthews was seen for a feedback appointment via in-person to discuss the results of his neuropsychological evaluation. His wife, Mrs. Chanel Matthews, was also present.     DISCUSSION OF RESULTS AND RECOMMENDATIONS:   I provided feedback about Mr. Matthews's neuropsychological testing results in detail:   Cognitive diagnosis: MCI of unknown/uncertain etiology  Contributing factors: Anxiety and depression, longstanding executive inefficiencies per self-report.  Other relevant discussion points:   Discussed high average intellectual baseline as a resiliency factor.  Although no concern for underlying dementia process at this time, continue to monitor.  Mr. Matthews and his wife expressed agreement with the results and that the findings were consistent with what is noticed in daily life.     Recommendations from the report were discussed in detail. He and his wife expressed understanding. The patient and/or their care partner(s) will:   Follow up with PCP to discuss pharmacological treatment for mood dysfunction.   Continued adjustment/transition to smaller living space. Discussed being able to make compromises with wife, and vice versa.   Utilize cognitive compensatory strategies in light of executive inefficiencies.   Return for re-eval in the future (about 1-2 years) should there be any further declines in cognitive functioning, especially if it starts to interfere with daily ADLs.     I answered any questions in detail, provided him with a printed copy of his report, and encouraged him and his family to contact us with any further questions or concerns moving forward.     MENTAL STATUS:  Arrival: On time and accompanied by his wife.  General appearance:

## 2025-06-10 ENCOUNTER — OFFICE VISIT (OUTPATIENT)
Age: 75
End: 2025-06-10
Payer: MEDICARE

## 2025-06-10 VITALS
HEIGHT: 72 IN | SYSTOLIC BLOOD PRESSURE: 106 MMHG | RESPIRATION RATE: 16 BRPM | TEMPERATURE: 98.4 F | OXYGEN SATURATION: 95 % | BODY MASS INDEX: 42.66 KG/M2 | WEIGHT: 315 LBS | HEART RATE: 64 BPM | DIASTOLIC BLOOD PRESSURE: 55 MMHG

## 2025-06-10 DIAGNOSIS — M1A.00X0 IDIOPATHIC CHRONIC GOUT WITHOUT TOPHUS, UNSPECIFIED SITE: ICD-10-CM

## 2025-06-10 DIAGNOSIS — E66.813 CLASS 3 SEVERE OBESITY DUE TO EXCESS CALORIES WITH SERIOUS COMORBIDITY AND BODY MASS INDEX (BMI) OF 45.0 TO 49.9 IN ADULT (HCC): ICD-10-CM

## 2025-06-10 DIAGNOSIS — R73.09 ELEVATED GLUCOSE: ICD-10-CM

## 2025-06-10 DIAGNOSIS — E78.2 MIXED HYPERLIPIDEMIA: ICD-10-CM

## 2025-06-10 DIAGNOSIS — L30.4 INTERTRIGO: ICD-10-CM

## 2025-06-10 DIAGNOSIS — I10 HYPERTENSION, UNSPECIFIED TYPE: Primary | ICD-10-CM

## 2025-06-10 DIAGNOSIS — F32.A DEPRESSION, UNSPECIFIED DEPRESSION TYPE: ICD-10-CM

## 2025-06-10 PROCEDURE — 1036F TOBACCO NON-USER: CPT | Performed by: STUDENT IN AN ORGANIZED HEALTH CARE EDUCATION/TRAINING PROGRAM

## 2025-06-10 PROCEDURE — 1159F MED LIST DOCD IN RCRD: CPT | Performed by: STUDENT IN AN ORGANIZED HEALTH CARE EDUCATION/TRAINING PROGRAM

## 2025-06-10 PROCEDURE — G2211 COMPLEX E/M VISIT ADD ON: HCPCS | Performed by: STUDENT IN AN ORGANIZED HEALTH CARE EDUCATION/TRAINING PROGRAM

## 2025-06-10 PROCEDURE — G8427 DOCREV CUR MEDS BY ELIG CLIN: HCPCS | Performed by: STUDENT IN AN ORGANIZED HEALTH CARE EDUCATION/TRAINING PROGRAM

## 2025-06-10 PROCEDURE — 99214 OFFICE O/P EST MOD 30 MIN: CPT | Performed by: STUDENT IN AN ORGANIZED HEALTH CARE EDUCATION/TRAINING PROGRAM

## 2025-06-10 PROCEDURE — 1123F ACP DISCUSS/DSCN MKR DOCD: CPT | Performed by: STUDENT IN AN ORGANIZED HEALTH CARE EDUCATION/TRAINING PROGRAM

## 2025-06-10 PROCEDURE — 3017F COLORECTAL CA SCREEN DOC REV: CPT | Performed by: STUDENT IN AN ORGANIZED HEALTH CARE EDUCATION/TRAINING PROGRAM

## 2025-06-10 PROCEDURE — 3078F DIAST BP <80 MM HG: CPT | Performed by: STUDENT IN AN ORGANIZED HEALTH CARE EDUCATION/TRAINING PROGRAM

## 2025-06-10 PROCEDURE — G8417 CALC BMI ABV UP PARAM F/U: HCPCS | Performed by: STUDENT IN AN ORGANIZED HEALTH CARE EDUCATION/TRAINING PROGRAM

## 2025-06-10 PROCEDURE — 3074F SYST BP LT 130 MM HG: CPT | Performed by: STUDENT IN AN ORGANIZED HEALTH CARE EDUCATION/TRAINING PROGRAM

## 2025-06-10 PROCEDURE — 1126F AMNT PAIN NOTED NONE PRSNT: CPT | Performed by: STUDENT IN AN ORGANIZED HEALTH CARE EDUCATION/TRAINING PROGRAM

## 2025-06-10 RX ORDER — NYSTATIN 100000 [USP'U]/G
POWDER TOPICAL
Qty: 60 G | Refills: 2 | Status: SHIPPED | OUTPATIENT
Start: 2025-06-10

## 2025-06-10 RX ORDER — BUPROPION HYDROCHLORIDE 150 MG/1
150 TABLET ORAL EVERY MORNING
Qty: 90 TABLET | Refills: 1 | Status: SHIPPED | OUTPATIENT
Start: 2025-06-10

## 2025-06-10 RX ORDER — VALSARTAN 160 MG/1
160 TABLET ORAL DAILY
Qty: 90 TABLET | Refills: 1 | Status: SHIPPED | OUTPATIENT
Start: 2025-06-10

## 2025-06-10 SDOH — ECONOMIC STABILITY: FOOD INSECURITY: WITHIN THE PAST 12 MONTHS, THE FOOD YOU BOUGHT JUST DIDN'T LAST AND YOU DIDN'T HAVE MONEY TO GET MORE.: NEVER TRUE

## 2025-06-10 SDOH — ECONOMIC STABILITY: FOOD INSECURITY: WITHIN THE PAST 12 MONTHS, YOU WORRIED THAT YOUR FOOD WOULD RUN OUT BEFORE YOU GOT MONEY TO BUY MORE.: NEVER TRUE

## 2025-06-10 ASSESSMENT — PATIENT HEALTH QUESTIONNAIRE - PHQ9
3. TROUBLE FALLING OR STAYING ASLEEP: NOT AT ALL
SUM OF ALL RESPONSES TO PHQ QUESTIONS 1-9: 0
9. THOUGHTS THAT YOU WOULD BE BETTER OFF DEAD, OR OF HURTING YOURSELF: NOT AT ALL
SUM OF ALL RESPONSES TO PHQ QUESTIONS 1-9: 0
2. FEELING DOWN, DEPRESSED OR HOPELESS: NOT AT ALL
4. FEELING TIRED OR HAVING LITTLE ENERGY: NOT AT ALL
6. FEELING BAD ABOUT YOURSELF - OR THAT YOU ARE A FAILURE OR HAVE LET YOURSELF OR YOUR FAMILY DOWN: NOT AT ALL
10. IF YOU CHECKED OFF ANY PROBLEMS, HOW DIFFICULT HAVE THESE PROBLEMS MADE IT FOR YOU TO DO YOUR WORK, TAKE CARE OF THINGS AT HOME, OR GET ALONG WITH OTHER PEOPLE: NOT DIFFICULT AT ALL
5. POOR APPETITE OR OVEREATING: NOT AT ALL
7. TROUBLE CONCENTRATING ON THINGS, SUCH AS READING THE NEWSPAPER OR WATCHING TELEVISION: NOT AT ALL
8. MOVING OR SPEAKING SO SLOWLY THAT OTHER PEOPLE COULD HAVE NOTICED. OR THE OPPOSITE, BEING SO FIGETY OR RESTLESS THAT YOU HAVE BEEN MOVING AROUND A LOT MORE THAN USUAL: NOT AT ALL
1. LITTLE INTEREST OR PLEASURE IN DOING THINGS: NOT AT ALL

## 2025-06-10 NOTE — PROGRESS NOTES
Connections: Not on file   Intimate Partner Violence: Not on file   Depression: Not at risk (6/10/2025)    PHQ-2     PHQ-2 Score: 0   Housing Stability: Low Risk  (6/10/2025)    Housing Stability Vital Sign     Unable to Pay for Housing in the Last Year: No     Number of Times Moved in the Last Year: 0     Homeless in the Last Year: No   Interpersonal Safety: Not on file   Utilities: Not At Risk (6/10/2025)    [facility] Utilities     Threatened with loss of utilities: No       
Skin is warm and dry.      Findings: Rash (erythematous rash present in bilateral inguinal folds) present.   Neurological:      General: No focal deficit present.      Mental Status: He is alert and oriented to person, place, and time.   Psychiatric:         Mood and Affect: Mood normal.         Behavior: Behavior normal.             Jose Dee DO

## 2025-06-11 ENCOUNTER — RESULTS FOLLOW-UP (OUTPATIENT)
Age: 75
End: 2025-06-11

## 2025-06-11 PROBLEM — F32.A DEPRESSION: Status: ACTIVE | Noted: 2025-06-11

## 2025-06-11 PROBLEM — L30.4 INTERTRIGO: Status: ACTIVE | Noted: 2025-06-11

## 2025-06-11 PROBLEM — R42 DIZZY: Status: RESOLVED | Noted: 2022-09-22 | Resolved: 2025-06-11

## 2025-06-11 PROBLEM — D69.2 PIGMENTED PURPURA: Status: RESOLVED | Noted: 2022-09-22 | Resolved: 2025-06-11

## 2025-06-11 LAB
ALBUMIN SERPL-MCNC: 4 G/DL (ref 3.5–5)
ALBUMIN/GLOB SERPL: 1.3 (ref 1.1–2.2)
ALP SERPL-CCNC: 105 U/L (ref 45–117)
ALT SERPL-CCNC: 28 U/L (ref 12–78)
ANION GAP SERPL CALC-SCNC: 7 MMOL/L (ref 2–12)
AST SERPL-CCNC: 26 U/L (ref 15–37)
BILIRUB SERPL-MCNC: 1 MG/DL (ref 0.2–1)
BUN SERPL-MCNC: 19 MG/DL (ref 6–20)
BUN/CREAT SERPL: 15 (ref 12–20)
CALCIUM SERPL-MCNC: 9.2 MG/DL (ref 8.5–10.1)
CHLORIDE SERPL-SCNC: 104 MMOL/L (ref 97–108)
CHOLEST SERPL-MCNC: 132 MG/DL
CO2 SERPL-SCNC: 28 MMOL/L (ref 21–32)
CREAT SERPL-MCNC: 1.28 MG/DL (ref 0.7–1.3)
ERYTHROCYTE [DISTWIDTH] IN BLOOD BY AUTOMATED COUNT: 12.9 % (ref 11.5–14.5)
EST. AVERAGE GLUCOSE BLD GHB EST-MCNC: 111 MG/DL
GLOBULIN SER CALC-MCNC: 3 G/DL (ref 2–4)
GLUCOSE SERPL-MCNC: 108 MG/DL (ref 65–100)
HBA1C MFR BLD: 5.5 % (ref 4–5.6)
HCT VFR BLD AUTO: 41.1 % (ref 36.6–50.3)
HDLC SERPL-MCNC: 69 MG/DL
HDLC SERPL: 1.9 (ref 0–5)
HGB BLD-MCNC: 13.7 G/DL (ref 12.1–17)
LDLC SERPL CALC-MCNC: 49.2 MG/DL (ref 0–100)
MCH RBC QN AUTO: 32.5 PG (ref 26–34)
MCHC RBC AUTO-ENTMCNC: 33.3 G/DL (ref 30–36.5)
MCV RBC AUTO: 97.6 FL (ref 80–99)
NRBC # BLD: 0 K/UL (ref 0–0.01)
NRBC BLD-RTO: 0 PER 100 WBC
PLATELET # BLD AUTO: 222 K/UL (ref 150–400)
PMV BLD AUTO: 10.3 FL (ref 8.9–12.9)
POTASSIUM SERPL-SCNC: 4.4 MMOL/L (ref 3.5–5.1)
PROT SERPL-MCNC: 7 G/DL (ref 6.4–8.2)
RBC # BLD AUTO: 4.21 M/UL (ref 4.1–5.7)
SODIUM SERPL-SCNC: 139 MMOL/L (ref 136–145)
TRIGL SERPL-MCNC: 69 MG/DL
VLDLC SERPL CALC-MCNC: 13.8 MG/DL
WBC # BLD AUTO: 7.8 K/UL (ref 4.1–11.1)

## 2025-06-11 NOTE — ASSESSMENT & PLAN NOTE
Has not had meaningful weight loss with Contrave, and also possibly contributing to excessive sweating. Switch to Wellbutrin 150 mg daily. Hopefully will still help with appetite and be more helpful for his depressive symptoms at this dose.

## 2025-06-11 NOTE — ASSESSMENT & PLAN NOTE
No meaningful weight loss with Contrave, so will stop this. Recommend to continue working on diet and getting regular moderate-intensity exercise.

## 2025-06-11 NOTE — ASSESSMENT & PLAN NOTE
Well-controlled, continue current medications. Continue home monitoring. If BP continues to run low-normal will reduce either valsartan or verapamil dose.

## 2025-06-11 NOTE — ASSESSMENT & PLAN NOTE
Sent rx for nystatin powder. Advised to make sure he keeps the area dry, may need to shower/change clothes multiple times during the day for now until rash has improved. If no improvement with these measures he will let me know and will send Rx for fluconazole.

## 2025-08-21 ENCOUNTER — OFFICE VISIT (OUTPATIENT)
Age: 75
End: 2025-08-21

## 2025-08-21 VITALS
BODY MASS INDEX: 41.69 KG/M2 | OXYGEN SATURATION: 96 % | HEART RATE: 64 BPM | RESPIRATION RATE: 18 BRPM | WEIGHT: 314.6 LBS | TEMPERATURE: 98.6 F | HEIGHT: 73 IN | DIASTOLIC BLOOD PRESSURE: 55 MMHG | SYSTOLIC BLOOD PRESSURE: 90 MMHG

## 2025-08-21 DIAGNOSIS — H10.33 ACUTE BACTERIAL CONJUNCTIVITIS OF BOTH EYES: Primary | ICD-10-CM

## 2025-08-21 RX ORDER — POLYMYXIN B SULFATE AND TRIMETHOPRIM 1; 10000 MG/ML; [USP'U]/ML
1 SOLUTION OPHTHALMIC EVERY 4 HOURS
Qty: 3 ML | Refills: 0 | Status: SHIPPED | OUTPATIENT
Start: 2025-08-21 | End: 2025-08-31

## 2025-08-21 ASSESSMENT — ENCOUNTER SYMPTOMS
ALLERGIC/IMMUNOLOGIC NEGATIVE: 1
GASTROINTESTINAL NEGATIVE: 1
EYE DISCHARGE: 1
RESPIRATORY NEGATIVE: 1
EYE REDNESS: 1

## 2025-08-21 ASSESSMENT — VISUAL ACUITY
OS_CC: 20/25
OU: 1
OD_CC: 20/40

## 2025-09-05 ENCOUNTER — OFFICE VISIT (OUTPATIENT)
Age: 75
End: 2025-09-05
Payer: MEDICARE

## 2025-09-05 ENCOUNTER — PATIENT MESSAGE (OUTPATIENT)
Age: 75
End: 2025-09-05

## 2025-09-05 VITALS
HEART RATE: 53 BPM | DIASTOLIC BLOOD PRESSURE: 68 MMHG | RESPIRATION RATE: 16 BRPM | SYSTOLIC BLOOD PRESSURE: 124 MMHG | OXYGEN SATURATION: 96 % | TEMPERATURE: 97.1 F | WEIGHT: 315 LBS | BODY MASS INDEX: 41.75 KG/M2 | HEIGHT: 73 IN

## 2025-09-05 DIAGNOSIS — H10.9 CONJUNCTIVITIS OF BOTH EYES, UNSPECIFIED CONJUNCTIVITIS TYPE: Primary | ICD-10-CM

## 2025-09-05 PROCEDURE — 3074F SYST BP LT 130 MM HG: CPT | Performed by: NURSE PRACTITIONER

## 2025-09-05 PROCEDURE — 99213 OFFICE O/P EST LOW 20 MIN: CPT | Performed by: NURSE PRACTITIONER

## 2025-09-05 PROCEDURE — 1123F ACP DISCUSS/DSCN MKR DOCD: CPT | Performed by: NURSE PRACTITIONER

## 2025-09-05 PROCEDURE — 1125F AMNT PAIN NOTED PAIN PRSNT: CPT | Performed by: NURSE PRACTITIONER

## 2025-09-05 PROCEDURE — 3017F COLORECTAL CA SCREEN DOC REV: CPT | Performed by: NURSE PRACTITIONER

## 2025-09-05 PROCEDURE — 3078F DIAST BP <80 MM HG: CPT | Performed by: NURSE PRACTITIONER

## 2025-09-05 PROCEDURE — 1036F TOBACCO NON-USER: CPT | Performed by: NURSE PRACTITIONER

## 2025-09-05 PROCEDURE — 1159F MED LIST DOCD IN RCRD: CPT | Performed by: NURSE PRACTITIONER

## 2025-09-05 PROCEDURE — G8427 DOCREV CUR MEDS BY ELIG CLIN: HCPCS | Performed by: NURSE PRACTITIONER

## 2025-09-05 PROCEDURE — G8417 CALC BMI ABV UP PARAM F/U: HCPCS | Performed by: NURSE PRACTITIONER

## 2025-09-05 RX ORDER — ERYTHROMYCIN 5 MG/G
OINTMENT OPHTHALMIC
Qty: 3.5 G | Refills: 0 | Status: SHIPPED | OUTPATIENT
Start: 2025-09-05 | End: 2025-09-15

## 2025-09-05 RX ORDER — POLYMYXIN B SULFATE AND TRIMETHOPRIM 1; 10000 MG/ML; [USP'U]/ML
SOLUTION OPHTHALMIC
COMMUNITY
Start: 2025-08-21

## 2025-09-05 ASSESSMENT — ENCOUNTER SYMPTOMS
EYE REDNESS: 1
EYE DISCHARGE: 1

## (undated) DEVICE — NDL PRT INJ NSAF BLNT 18GX1.5 --

## (undated) DEVICE — SYR 3ML LL TIP 1/10ML GRAD --

## (undated) DEVICE — 1200 GUARD II KIT W/5MM TUBE W/O VAC TUBE: Brand: GUARDIAN

## (undated) DEVICE — KIT COLON W/ 1.1OZ LUB AND 2 END

## (undated) DEVICE — SOLIDIFIER MEDC 1200ML -- CONVERT TO 356117

## (undated) DEVICE — BAG BELONG PT PERS CLEAR HANDL

## (undated) DEVICE — CUFF RMFG BP INF SZ 11 DISP -- LAWSON OEM ITEM 238915

## (undated) DEVICE — SET ADMIN 16ML TBNG L100IN 2 Y INJ SITE IV PIGGY BK DISP

## (undated) DEVICE — KENDALL RADIOLUCENT FOAM MONITORING ELECTRODE -RECTANGULAR SHAPE: Brand: KENDALL

## (undated) DEVICE — SYR 5ML 1/5 GRAD LL NSAF LF --

## (undated) DEVICE — CATH IV AUTOGRD BC BLU 22GA 25 -- INSYTE

## (undated) DEVICE — BASIN EMSIS 16OZ GRAPHITE PLAS KID SHP MOLD GRAD FOR ORAL

## (undated) DEVICE — NDL FLTR TIP 5 MIC 18GX1.5IN --

## (undated) DEVICE — ADULT SPO2 SENSOR: Brand: NELLCOR

## (undated) DEVICE — Device